# Patient Record
Sex: FEMALE | Race: OTHER | HISPANIC OR LATINO | Employment: FULL TIME | URBAN - METROPOLITAN AREA
[De-identification: names, ages, dates, MRNs, and addresses within clinical notes are randomized per-mention and may not be internally consistent; named-entity substitution may affect disease eponyms.]

---

## 2017-04-05 ENCOUNTER — HOSPITAL ENCOUNTER (EMERGENCY)
Facility: HOSPITAL | Age: 22
Discharge: HOME | End: 2017-04-05
Attending: EMERGENCY MEDICINE | Admitting: EMERGENCY MEDICINE

## 2017-04-05 VITALS
HEART RATE: 83 BPM | SYSTOLIC BLOOD PRESSURE: 107 MMHG | RESPIRATION RATE: 16 BRPM | OXYGEN SATURATION: 98 % | WEIGHT: 175 LBS | DIASTOLIC BLOOD PRESSURE: 63 MMHG | TEMPERATURE: 98.3 F

## 2017-04-05 DIAGNOSIS — R10.9 ABDOMINAL PAIN: Primary | ICD-10-CM

## 2017-04-05 LAB
ALBUMIN SERPL BCP-MCNC: 3.3 G/DL (ref 3.5–5)
ALP SERPL-CCNC: 80 U/L (ref 46–116)
ALT SERPL W P-5'-P-CCNC: 30 U/L (ref 12–78)
ANION GAP SERPL CALCULATED.3IONS-SCNC: 6 MMOL/L (ref 4–13)
AST SERPL W P-5'-P-CCNC: 22 U/L (ref 5–45)
BASOPHILS # BLD AUTO: 0.02 THOUSANDS/ΜL (ref 0–0.1)
BASOPHILS NFR BLD AUTO: 0 % (ref 0–1)
BILIRUB SERPL-MCNC: 0.28 MG/DL (ref 0.2–1)
BILIRUB UR QL STRIP: NEGATIVE
BUN SERPL-MCNC: 12 MG/DL (ref 5–25)
CALCIUM SERPL-MCNC: 8.7 MG/DL (ref 8.3–10.1)
CHLORIDE SERPL-SCNC: 102 MMOL/L (ref 100–108)
CLARITY UR: CLEAR
CO2 SERPL-SCNC: 31 MMOL/L (ref 21–32)
COLOR UR: YELLOW
COLOR, POC: YELLOW
CREAT SERPL-MCNC: 0.91 MG/DL (ref 0.6–1.3)
EOSINOPHIL # BLD AUTO: 0.15 THOUSAND/ΜL (ref 0–0.61)
EOSINOPHIL NFR BLD AUTO: 2 % (ref 0–6)
ERYTHROCYTE [DISTWIDTH] IN BLOOD BY AUTOMATED COUNT: 13.3 % (ref 11.6–15.1)
GFR SERPL CREATININE-BSD FRML MDRD: >60 ML/MIN/1.73SQ M
GLUCOSE SERPL-MCNC: 88 MG/DL (ref 65–140)
GLUCOSE UR STRIP-MCNC: NEGATIVE MG/DL
HCG UR QL: NEGATIVE
HCT VFR BLD AUTO: 41.2 % (ref 34.8–46.1)
HGB BLD-MCNC: 14.3 G/DL (ref 11.5–15.4)
HGB UR QL STRIP.AUTO: NEGATIVE
KETONES UR STRIP-MCNC: NEGATIVE MG/DL
LEUKOCYTE ESTERASE UR QL STRIP: NEGATIVE
LIPASE SERPL-CCNC: 145 U/L (ref 73–393)
LYMPHOCYTES # BLD AUTO: 2.12 THOUSANDS/ΜL (ref 0.6–4.47)
LYMPHOCYTES NFR BLD AUTO: 21 % (ref 14–44)
MCH RBC QN AUTO: 30 PG (ref 26.8–34.3)
MCHC RBC AUTO-ENTMCNC: 34.7 G/DL (ref 31.4–37.4)
MCV RBC AUTO: 87 FL (ref 82–98)
MONOCYTES # BLD AUTO: 0.92 THOUSAND/ΜL (ref 0.17–1.22)
MONOCYTES NFR BLD AUTO: 9 % (ref 4–12)
NEUTROPHILS # BLD AUTO: 6.96 THOUSANDS/ΜL (ref 1.85–7.62)
NEUTS SEG NFR BLD AUTO: 68 % (ref 43–75)
NITRITE UR QL STRIP: NEGATIVE
NRBC BLD AUTO-RTO: 0 /100 WBCS
PH UR STRIP.AUTO: 7 [PH] (ref 4.5–8)
PLATELET # BLD AUTO: 332 THOUSANDS/UL (ref 149–390)
PMV BLD AUTO: 9.3 FL (ref 8.9–12.7)
POTASSIUM SERPL-SCNC: 3.9 MMOL/L (ref 3.5–5.3)
PROT SERPL-MCNC: 7.4 G/DL (ref 6.4–8.2)
PROT UR STRIP-MCNC: NEGATIVE MG/DL
RBC # BLD AUTO: 4.76 MILLION/UL (ref 3.81–5.12)
SODIUM SERPL-SCNC: 139 MMOL/L (ref 136–145)
SP GR UR STRIP.AUTO: 1.01 (ref 1–1.03)
UROBILINOGEN UR QL STRIP.AUTO: 0.2 E.U./DL
WBC # BLD AUTO: 10.17 THOUSAND/UL (ref 4.31–10.16)

## 2017-04-05 PROCEDURE — 80053 COMPREHEN METABOLIC PANEL: CPT | Performed by: EMERGENCY MEDICINE

## 2017-04-05 PROCEDURE — 83690 ASSAY OF LIPASE: CPT | Performed by: EMERGENCY MEDICINE

## 2017-04-05 PROCEDURE — 85025 COMPLETE CBC W/AUTO DIFF WBC: CPT | Performed by: EMERGENCY MEDICINE

## 2017-04-05 PROCEDURE — 99284 EMERGENCY DEPT VISIT MOD MDM: CPT

## 2017-04-05 PROCEDURE — 36415 COLL VENOUS BLD VENIPUNCTURE: CPT | Performed by: EMERGENCY MEDICINE

## 2017-04-05 PROCEDURE — 81025 URINE PREGNANCY TEST: CPT

## 2017-04-05 PROCEDURE — 81003 URINALYSIS AUTO W/O SCOPE: CPT

## 2017-04-05 PROCEDURE — 81002 URINALYSIS NONAUTO W/O SCOPE: CPT

## 2017-04-05 RX ORDER — NAPROXEN 500 MG/1
500 TABLET ORAL 2 TIMES DAILY WITH MEALS
Qty: 20 TABLET | Refills: 0 | Status: SHIPPED | OUTPATIENT
Start: 2017-04-05 | End: 2019-04-05

## 2017-11-08 ENCOUNTER — HOSPITAL ENCOUNTER (EMERGENCY)
Facility: HOSPITAL | Age: 22
Discharge: HOME | End: 2017-11-08
Attending: EMERGENCY MEDICINE | Admitting: EMERGENCY MEDICINE

## 2017-11-08 VITALS
SYSTOLIC BLOOD PRESSURE: 138 MMHG | DIASTOLIC BLOOD PRESSURE: 65 MMHG | TEMPERATURE: 98.6 F | HEART RATE: 96 BPM | WEIGHT: 190 LBS | OXYGEN SATURATION: 98 % | RESPIRATION RATE: 18 BRPM

## 2017-11-08 DIAGNOSIS — F32.A DEPRESSION, UNSPECIFIED DEPRESSION TYPE: Primary | ICD-10-CM

## 2017-11-08 PROCEDURE — 99283 EMERGENCY DEPT VISIT LOW MDM: CPT

## 2017-11-08 NOTE — DISCHARGE INSTRUCTIONS
You have been given a list of resources for depression treatment  You denied you were suicidal, homicidal, ideations, auditory or visual hallucinations  Call Crisis if you are feeling suicidal       Depression, Ambulatory Care   GENERAL INFORMATION:   Depression  is a medical condition that causes feelings of sadness or hopelessness that do not go away  Depression may cause you to lose interest in things you used to enjoy  These feelings may interfere with your daily life  Common symptoms include the following:   · Appetite changes, or weight gain or loss    · Trouble going to sleep or staying asleep, or sleeping too much    · Fatigue or lack of energy    · Feeling restless, irritable, or withdrawn    · Feeling worthless, hopeless, discouraged, or very guilty    · Trouble concentrating, remembering things, doing daily tasks, or making decisions    · Thoughts about hurting or killing yourself  Seek immediate care for the following symptoms:   · You think about harming yourself or someone else  Treatment for depression  may include medicine to improve or balance your mood  Therapy may also be used to treat your depression  A therapist will help you learn to cope with your thoughts and feelings  Therapy can be done alone or in a group  It may also be done with family members or a significant other  Manage depression:   · Get regular physical activity  Try to exercise for 30 minutes, 3 to 5 days a week  Work with your healthcare provider to develop an exercise plan that you enjoy  · Get enough sleep  Create a routine to help you relax before bed  Try to go to bed and wake up at the same time every day  Sleep is important for emotional health  · Eat a variety of healthy foods  Healthy foods include fruits, vegetables, whole-grain breads, low-fat dairy products, beans, lean meats, and fish  A healthy meal plan is low in fat, salt, and added sugar  · Avoid or limit alcohol    Ask your healthcare provider how much alcohol is safe for you to drink  A drink of alcohol is 12 ounces of beer, 5 ounces of wine, or 1½ ounces of liquor  Follow up with your healthcare provider as directed: You will need to return so your healthcare provider can monitor your progress  Write down your questions so you remember to ask them during your visits  CARE AGREEMENT:   You have the right to help plan your care  Learn about your health condition and how it may be treated  Discuss treatment options with your caregivers to decide what care you want to receive  You always have the right to refuse treatment  The above information is an  only  It is not intended as medical advice for individual conditions or treatments  Talk to your doctor, nurse or pharmacist before following any medical regimen to see if it is safe and effective for you  © 2014 [address] is for End User's use only and may not be sold, redistributed or otherwise used for commercial purposes  All illustrations and images included in CareNotes® are the copyrighted property of [employer]  or Jorge Byrne

## 2017-11-08 NOTE — ED PROVIDER NOTES
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, she states she has a lot of stressors and is depressed  She denies any SI, HI, AH, VH  She states she does not have a PCP for follow up  She states she has never been on anti depressants before  She states she was just seen at [address] [place] [address] [address] and was told they do not prescribe meds, she was given a school note and referral info and "nothing was done for me"  History provided by:  Patient and medical records  History limited by:  Acuity of condition   used: No    Depression   Presenting symptoms: depression    Presenting symptoms: no suicidal thoughts    Degree of incapacity (severity): Moderate  Onset quality:  Gradual  Chronicity:  New  Context: stressful life event    Treatment compliance:  Untreated  Relieved by:  Nothing  Worsened by:  Nothing  Ineffective treatments:  None tried  Associated symptoms: trouble in school        Prior to Admission Medications   Prescriptions Last Dose Informant Patient Reported? Taking?   naproxen (NAPROSYN) 500 mg tablet   No No   Sig: Take 1 tablet by mouth 2 (two) times a day with meals for 10 days      Facility-Administered Medications: None       History reviewed  No pertinent past medical history  History reviewed  No pertinent surgical history  History reviewed  No pertinent family history  I have reviewed and agree with the history as documented      Social History   Substance Use Topics    Smoking status: Never Smoker    Smokeless tobacco: Never Used    Alcohol use Yes      Comment: socially        Review of Systems   Constitutional: Negative  HENT: Negative  Eyes: Negative  Respiratory: Negative  Cardiovascular: Negative  Gastrointestinal: Negative  Genitourinary: Negative  Musculoskeletal: Negative  Skin: Negative  Allergic/Immunologic: Negative  Neurological: Negative  Hematological: Negative  Psychiatric/Behavioral: Positive for decreased concentration and depression  Negative for suicidal ideas  Physical Exam  ED Triage Vitals [11/08/17 0137]   Temperature Pulse Respirations Blood Pressure SpO2   98 6 °F (37 °C) 96 18 138/65 98 %      Temp src Heart Rate Source Patient Position - Orthostatic VS BP Location FiO2 (%)   -- -- -- -- --      Pain Score       No Pain           Orthostatic Vital Signs  Vitals:    11/08/17 0137   BP: 138/65   Pulse: 96       Physical Exam   Constitutional: She is oriented to person, place, and time  She appears well-developed and well-nourished  She appears distressed  Tearful and crying over situation    HENT:   Head: Normocephalic and atraumatic  Eyes: Conjunctivae and EOM are normal  Pupils are equal, round, and reactive to light  Neck: Normal range of motion  Neck supple  Cardiovascular: Normal rate, regular rhythm and normal heart sounds  Pulmonary/Chest: Effort normal and breath sounds normal    Abdominal: Soft  Bowel sounds are normal    Musculoskeletal: Normal range of motion  Neurological: She is alert and oriented to person, place, and time  Skin: Skin is warm and dry  Capillary refill takes less than 2 seconds  She is not diaphoretic  Psychiatric: She has a normal mood and affect  Her behavior is normal  Judgment and thought content normal    Nursing note and vitals reviewed        ED Medications  Medications - No data to display    Diagnostic Studies  Results Reviewed     None                 No orders to display              Procedures  Procedures       Phone Contacts  ED Phone Contact    ED Course  ED Course                                MDM  Number of Diagnoses or Management Options  Depression, unspecified depression type:   Diagnosis management comments: Depression - denies SI, HI, AH, VH    List of OP mental health referrals given to pt  Explained to call crisis if needed  Pt verbalizes understanding of d/c instructions  CritCare Time    Disposition  Final diagnoses:   Depression, unspecified depression type     Time reflects when diagnosis was documented in both MDM as applicable and the Disposition within this note     Time User Action Codes Description Comment    11/8/2017  1:51 AM Andra Organ Add [F32 9] Depression, unspecified depression type       ED Disposition     ED Disposition Condition Comment    Discharge  [place] discharge to home/self care  Condition at discharge: Good        Follow-up Information     Follow up With Specialties Details Why Contact Info Additional Information    [address]  Schedule an appointment as soon as possible for a visit  [facility] Emergency Department Emergency Medicine  If symptoms worsen [address] [place] [address] ED, [address] [place] [address], [address]        Discharge Medication List as of 11/8/2017  1:52 AM      CONTINUE these medications which have NOT CHANGED    Details   naproxen (NAPROSYN) 500 mg tablet Take 1 tablet by mouth 2 (two) times a day with meals for 10 days, Starting 4/5/2017, Until Sat 4/15/17, Print           No discharge procedures on file      ED Provider  Electronically Signed by           David Harris  11/08/17 0159

## 2018-10-03 ENCOUNTER — OFFICE VISIT (OUTPATIENT)
Dept: OBGYN CLINIC | Facility: CLINIC | Age: 23
End: 2018-10-03

## 2018-10-03 ENCOUNTER — APPOINTMENT (OUTPATIENT)
Dept: LAB | Facility: HOSPITAL | Age: 23
End: 2018-10-03
Attending: OBSTETRICS & GYNECOLOGY
Payer: COMMERCIAL

## 2018-10-03 VITALS
WEIGHT: 179.8 LBS | HEIGHT: 59 IN | DIASTOLIC BLOOD PRESSURE: 70 MMHG | BODY MASS INDEX: 36.25 KG/M2 | SYSTOLIC BLOOD PRESSURE: 110 MMHG

## 2018-10-03 DIAGNOSIS — N92.6 MISSED PERIOD: ICD-10-CM

## 2018-10-03 DIAGNOSIS — N92.6 MISSED PERIOD: Primary | ICD-10-CM

## 2018-10-03 LAB
B-HCG SERPL-ACNC: <2 MIU/ML
SL AMB POCT URINE HCG: NEGATIVE

## 2018-10-03 PROCEDURE — 36415 COLL VENOUS BLD VENIPUNCTURE: CPT

## 2018-10-03 PROCEDURE — 84702 CHORIONIC GONADOTROPIN TEST: CPT

## 2018-10-03 PROCEDURE — 81025 URINE PREGNANCY TEST: CPT | Performed by: OBSTETRICS & GYNECOLOGY

## 2018-10-03 PROCEDURE — 99202 OFFICE O/P NEW SF 15 MIN: CPT | Performed by: OBSTETRICS & GYNECOLOGY

## 2018-10-04 NOTE — PROGRESS NOTES
Assessment/Plan:     Assessment:  Missed  Plan: Will check a urine pregnancy test today    We will then follow that with a serum pregnancy test as well    Birth control decision will then be determine    Patient will call should any problems issues or concerns arise       Diagnoses and all orders for this visit:    Missed period  -     POCT urine HCG  -     hCG, quantitative; Future          Subjective:     Patient ID: Jace Sweeney is a 25 y o  female  Patient is a 42-year-old female who presents today complaining of a missed  Patient reports that her periods have generally been normal and she is not experiencing any erratic bleeding    She also denies any significant pelvic or abdominal pain    She denies any fevers shakes or chills as well    She is sexually active and uses barrier protection for contraception faithfully    We obtained a baseline urine pregnancy test today which was negative and we will order a serum pregnancy test to confirmed that she is not pregnant    Once all results are confirmed, we will determine which option she desires for birth control    All questions were answered in detail for her at today's visit    Time spent for this patient was 20 minutes of which greater than 50% was spent face-to-face counseling and developing a plan of care        Review of Systems   Constitutional: Negative  HENT: Negative  Eyes: Negative  Respiratory: Negative  Cardiovascular: Negative  Gastrointestinal: Negative  Endocrine: Negative  Genitourinary: Negative  Musculoskeletal: Negative  Skin: Negative  Neurological: Negative  Psychiatric/Behavioral: Negative  Objective:     Physical Exam   Constitutional: She is oriented to person, place, and time  She appears well-developed and well-nourished  HENT:   Head: Normocephalic  Eyes: Pupils are equal, round, and reactive to light  Neck: Normal range of motion     Pulmonary/Chest: Effort normal  Musculoskeletal: Normal range of motion  Neurological: She is alert and oriented to person, place, and time  Psychiatric: She has a normal mood and affect   Her behavior is normal  Judgment and thought content normal

## 2018-10-04 NOTE — PATIENT INSTRUCTIONS
Topic:  Ms     Urine pregnancy test performed in the office today was negative    We will perform a serum pregnancy test to confirm the results    Patient will then make a decision for birth control    All questions were answered in detail for her today    Patient will call should any problems issues or concerns arise for her

## 2019-03-05 ENCOUNTER — HOSPITAL ENCOUNTER (EMERGENCY)
Facility: HOSPITAL | Age: 24
Discharge: HOME | End: 2019-03-05
Attending: EMERGENCY MEDICINE

## 2019-03-05 VITALS
BODY MASS INDEX: 35.75 KG/M2 | RESPIRATION RATE: 14 BRPM | OXYGEN SATURATION: 100 % | HEART RATE: 66 BPM | TEMPERATURE: 98.2 F | DIASTOLIC BLOOD PRESSURE: 77 MMHG | SYSTOLIC BLOOD PRESSURE: 116 MMHG | WEIGHT: 177 LBS

## 2019-03-05 DIAGNOSIS — R00.2 HEART PALPITATIONS: ICD-10-CM

## 2019-03-05 DIAGNOSIS — Z34.90 PREGNANCY: ICD-10-CM

## 2019-03-05 DIAGNOSIS — R00.2 PALPITATIONS: Primary | ICD-10-CM

## 2019-03-05 LAB
ALBUMIN SERPL BCP-MCNC: 3.3 G/DL (ref 3.5–5)
ALP SERPL-CCNC: 74 U/L (ref 46–116)
ALT SERPL W P-5'-P-CCNC: 22 U/L (ref 12–78)
ANION GAP SERPL CALCULATED.3IONS-SCNC: 7 MMOL/L (ref 4–13)
AST SERPL W P-5'-P-CCNC: 19 U/L (ref 5–45)
ATRIAL RATE: 76 BPM
B-HCG SERPL-ACNC: 931.9 MIU/ML
BASOPHILS # BLD AUTO: 0.02 THOUSANDS/ΜL (ref 0–0.1)
BASOPHILS NFR BLD AUTO: 0 % (ref 0–1)
BILIRUB SERPL-MCNC: 0.2 MG/DL (ref 0.2–1)
BILIRUB UR QL STRIP: NEGATIVE
BUN SERPL-MCNC: 13 MG/DL (ref 5–25)
CALCIUM SERPL-MCNC: 8.7 MG/DL (ref 8.3–10.1)
CHLORIDE SERPL-SCNC: 103 MMOL/L (ref 100–108)
CLARITY UR: CLEAR
CO2 SERPL-SCNC: 29 MMOL/L (ref 21–32)
COLOR UR: YELLOW
CREAT SERPL-MCNC: 0.92 MG/DL (ref 0.6–1.3)
EOSINOPHIL # BLD AUTO: 0.13 THOUSAND/ΜL (ref 0–0.61)
EOSINOPHIL NFR BLD AUTO: 1 % (ref 0–6)
ERYTHROCYTE [DISTWIDTH] IN BLOOD BY AUTOMATED COUNT: 13.7 % (ref 11.6–15.1)
EXT PREG TEST URINE: POSITIVE
GFR SERPL CREATININE-BSD FRML MDRD: 88 ML/MIN/1.73SQ M
GLUCOSE SERPL-MCNC: 88 MG/DL (ref 65–140)
GLUCOSE UR STRIP-MCNC: NEGATIVE MG/DL
HCT VFR BLD AUTO: 44.8 % (ref 34.8–46.1)
HGB BLD-MCNC: 15 G/DL (ref 11.5–15.4)
HGB UR QL STRIP.AUTO: NEGATIVE
IMM GRANULOCYTES # BLD AUTO: 0.04 THOUSAND/UL (ref 0–0.2)
IMM GRANULOCYTES NFR BLD AUTO: 0 % (ref 0–2)
KETONES UR STRIP-MCNC: NEGATIVE MG/DL
LEUKOCYTE ESTERASE UR QL STRIP: NEGATIVE
LYMPHOCYTES # BLD AUTO: 3.05 THOUSANDS/ΜL (ref 0.6–4.47)
LYMPHOCYTES NFR BLD AUTO: 23 % (ref 14–44)
MCH RBC QN AUTO: 29.5 PG (ref 26.8–34.3)
MCHC RBC AUTO-ENTMCNC: 33.5 G/DL (ref 31.4–37.4)
MCV RBC AUTO: 88 FL (ref 82–98)
MONOCYTES # BLD AUTO: 0.84 THOUSAND/ΜL (ref 0.17–1.22)
MONOCYTES NFR BLD AUTO: 6 % (ref 4–12)
NEUTROPHILS # BLD AUTO: 8.96 THOUSANDS/ΜL (ref 1.85–7.62)
NEUTS SEG NFR BLD AUTO: 70 % (ref 43–75)
NITRITE UR QL STRIP: NEGATIVE
NRBC BLD AUTO-RTO: 0 /100 WBCS
P AXIS: 44 DEGREES
PH UR STRIP.AUTO: 6.5 [PH] (ref 4.5–8)
PLATELET # BLD AUTO: 379 THOUSANDS/UL (ref 149–390)
PMV BLD AUTO: 9.3 FL (ref 8.9–12.7)
POTASSIUM SERPL-SCNC: 3.8 MMOL/L (ref 3.5–5.3)
PR INTERVAL: 140 MS
PROT SERPL-MCNC: 7.8 G/DL (ref 6.4–8.2)
PROT UR STRIP-MCNC: NEGATIVE MG/DL
QRS AXIS: 9 DEGREES
QRSD INTERVAL: 64 MS
QT INTERVAL: 358 MS
QTC INTERVAL: 402 MS
RBC # BLD AUTO: 5.09 MILLION/UL (ref 3.81–5.12)
SODIUM SERPL-SCNC: 139 MMOL/L (ref 136–145)
SP GR UR STRIP.AUTO: 1.02 (ref 1–1.03)
T WAVE AXIS: 2 DEGREES
TSH SERPL DL<=0.05 MIU/L-ACNC: 2 UIU/ML (ref 0.36–3.74)
UROBILINOGEN UR QL STRIP.AUTO: 0.2 E.U./DL
VENTRICULAR RATE: 76 BPM
WBC # BLD AUTO: 13.04 THOUSAND/UL (ref 4.31–10.16)

## 2019-03-05 PROCEDURE — 80053 COMPREHEN METABOLIC PANEL: CPT | Performed by: PHYSICIAN ASSISTANT

## 2019-03-05 PROCEDURE — 93005 ELECTROCARDIOGRAM TRACING: CPT

## 2019-03-05 PROCEDURE — 81003 URINALYSIS AUTO W/O SCOPE: CPT

## 2019-03-05 PROCEDURE — 96360 HYDRATION IV INFUSION INIT: CPT

## 2019-03-05 PROCEDURE — 84702 CHORIONIC GONADOTROPIN TEST: CPT | Performed by: PHYSICIAN ASSISTANT

## 2019-03-05 PROCEDURE — 85025 COMPLETE CBC W/AUTO DIFF WBC: CPT | Performed by: PHYSICIAN ASSISTANT

## 2019-03-05 PROCEDURE — 36415 COLL VENOUS BLD VENIPUNCTURE: CPT | Performed by: PHYSICIAN ASSISTANT

## 2019-03-05 PROCEDURE — 99285 EMERGENCY DEPT VISIT HI MDM: CPT

## 2019-03-05 PROCEDURE — 81025 URINE PREGNANCY TEST: CPT | Performed by: EMERGENCY MEDICINE

## 2019-03-05 PROCEDURE — 84443 ASSAY THYROID STIM HORMONE: CPT | Performed by: PHYSICIAN ASSISTANT

## 2019-03-05 PROCEDURE — 93010 ELECTROCARDIOGRAM REPORT: CPT | Performed by: INTERNAL MEDICINE

## 2019-03-05 RX ORDER — FAMOTIDINE 20 MG
1 TABLET ORAL DAILY
Qty: 30 EACH | Refills: 0 | Status: SHIPPED | OUTPATIENT
Start: 2019-03-05 | End: 2019-04-05 | Stop reason: SDUPTHER

## 2019-03-05 RX ADMIN — SODIUM CHLORIDE 1000 ML: 0.9 INJECTION, SOLUTION INTRAVENOUS at 07:53

## 2019-03-05 NOTE — ED PROVIDER NOTES
History  Chief Complaint   Patient presents with    Palpitations     Pt  reports intermittent palpitations x 2 weeks becoming more frequent with episodes of dizziness  Denies sob  This is a 51-year-old female patient presented 2 weeks history of intermittent palpitations in her chest without chest pain or shortness of breath  She noticed over the last week she has had more frequent episodes she states about 8-10 a day  Today she went to get up from a seated position and she became dizzy so she was sent in by her employment  She has never had her thyroid tested  Her last menstrual cycle was in December 2018 but states it is irregular due to [employer] birth control  No headache no blurred vision or double vision no cough congestion sore throat nausea vomiting diarrhea abdominal pain no chest pain or shortness of breath urgency frequency or dysuria  No nothing makes it better or worse she tried nothing over-the-counter and she is not seen her family doctor because she has not have 1  Differential diagnosis includes not limited to palpitations due to anxiety, palpitations due to thyroid issues, pregnancy, electrolyte abnormality, cardiac arrhythmia          Prior to Admission Medications   Prescriptions Last Dose Informant Patient Reported? Taking?   naproxen (NAPROSYN) 500 mg tablet   No No   Sig: Take 1 tablet by mouth 2 (two) times a day with meals for 10 days      Facility-Administered Medications: None       History reviewed  No pertinent past medical history  History reviewed  No pertinent surgical history  History reviewed  No pertinent family history  I have reviewed and agree with the history as documented  Social History     Tobacco Use    Smoking status: Never Smoker    Smokeless tobacco: Never Used   Substance Use Topics    Alcohol use: Yes     Comment: socially    Drug use: No        Review of Systems   All other systems reviewed and are negative        Physical Exam  Physical Exam Constitutional: She appears well-developed and well-nourished  HENT:   Head: Normocephalic and atraumatic  Right Ear: External ear normal    Left Ear: External ear normal    Nose: Nose normal    Mouth/Throat: Oropharynx is clear and moist    Eyes: Pupils are equal, round, and reactive to light  Conjunctivae are normal    Neck: Normal range of motion  Neck supple  Cardiovascular: Normal rate and regular rhythm  Pulmonary/Chest: Effort normal and breath sounds normal    Abdominal: Soft  Bowel sounds are normal  There is no tenderness  Neurological: She is alert  Skin: Skin is warm  Psychiatric: She has a normal mood and affect  Her behavior is normal    Nursing note and vitals reviewed        Vital Signs  ED Triage Vitals   Temperature Pulse Respirations Blood Pressure SpO2   03/05/19 0717 03/05/19 0716 03/05/19 0716 03/05/19 0716 03/05/19 0716   98 2 °F (36 8 °C) 76 18 117/79 100 %      Temp Source Heart Rate Source Patient Position - Orthostatic VS BP Location FiO2 (%)   03/05/19 0717 03/05/19 0922 03/05/19 0922 03/05/19 0922 --   Oral Monitor Lying Right arm       Pain Score       03/05/19 0716       No Pain           Vitals:    03/05/19 0716 03/05/19 0922   BP: 117/79 116/77   Pulse: 76 66   Patient Position - Orthostatic VS:  Lying       Visual Acuity  Visual Acuity      Most Recent Value   L Pupil Size (mm)  3   R Pupil Size (mm)  3          ED Medications  Medications   sodium chloride 0 9 % bolus 1,000 mL (0 mL Intravenous Stopped 3/5/19 0858)       Diagnostic Studies  Results Reviewed     Procedure Component Value Units Date/Time    Quantitative hCG [[id number]]  (Abnormal) Collected:  03/05/19 0752    Lab Status:  Final result Specimen:  Blood from Arm, Left Updated:  03/05/19 0906     HCG, Quant 931 9 mIU/mL     Narrative:        Expected Ranges:   Approximate               Approximate HCG  Gestation age          Concentration ( mIU/mL)  _____________          ______________________   Liane Ewing HCG values  0 2-1                       5-50  1-2                           2-3                         100-5000  3-4                         500-15246  4-5                         1000-71186  5-6                         58207-027204  6-8                         41215-891323  8-12                        13817-294592    TSH [[id number]]  (Normal) Collected:  03/05/19 0752    Lab Status:  Final result Specimen:  Blood from Arm, Left Updated:  03/05/19 0837     TSH 3RD GENERATON 1 998 uIU/mL     Narrative:       Patients undergoing fluorescein dye angiography may retain small amounts of fluorescein in the body for 48-72 hours post procedure  Samples containing fluorescein can produce falsely depressed TSH values  If the patient had this procedure,a specimen should be resubmitted post fluorescein clearance  Comprehensive metabolic panel [[id number]]  (Abnormal) Collected:  03/05/19 0752    Lab Status:  Final result Specimen:  Blood from Arm, Left Updated:  03/05/19 1933     Sodium 139 mmol/L      Potassium 3 8 mmol/L      Chloride 103 mmol/L      CO2 29 mmol/L      ANION GAP 7 mmol/L      BUN 13 mg/dL      Creatinine 0 92 mg/dL      Glucose 88 mg/dL      Calcium 8 7 mg/dL      AST 19 U/L      ALT 22 U/L      Alkaline Phosphatase 74 U/L      Total Protein 7 8 g/dL      Albumin 3 3 g/dL      Total Bilirubin 0 20 mg/dL      eGFR 88 ml/min/1 73sq m     Narrative:       National Kidney Disease Education Program recommendations are as follows:  GFR calculation is accurate only with a steady state creatinine  Chronic Kidney disease less than 60 ml/min/1 73 sq  meters  Kidney failure less than 15 ml/min/1 73 sq  meters      CBC and differential [[id number]]  (Abnormal) Collected:  03/05/19 0752    Lab Status:  Final result Specimen:  Blood from Arm, Left Updated:  03/05/19 0801     WBC 13 04 Thousand/uL      RBC 5 09 Million/uL      Hemoglobin 15 0 g/dL      Hematocrit 44 8 %      MCV 88 fL      MCH 29 5 pg MCHC 33 5 g/dL      RDW 13 7 %      MPV 9 3 fL      Platelets 271 Thousands/uL      nRBC 0 /100 WBCs      Neutrophils Relative 70 %      Immat GRANS % 0 %      Lymphocytes Relative 23 %      Monocytes Relative 6 %      Eosinophils Relative 1 %      Basophils Relative 0 %      Neutrophils Absolute 8 96 Thousands/µL      Immature Grans Absolute 0 04 Thousand/uL      Lymphocytes Absolute 3 05 Thousands/µL      Monocytes Absolute 0 84 Thousand/µL      Eosinophils Absolute 0 13 Thousand/µL      Basophils Absolute 0 02 Thousands/µL     POCT urinalysis dipstick [[id number]]  (Abnormal) Resulted:  03/05/19 0757    Lab Status:  Final result Updated:  03/05/19 0757    POCT pregnancy, urine [[id number]]  (Abnormal) Resulted:  03/05/19 0757    Lab Status:  Final result Updated:  03/05/19 0757     EXT PREG TEST UR (Ref: Negative) positive    ED Urine Macroscopic [[id number]] Collected:  03/05/19 0750    Lab Status:  Final result Specimen:  Urine Updated:  03/05/19 0749     Color, UA Yellow     Clarity, UA Clear     pH, UA 6 5     Leukocytes, UA Negative     Nitrite, UA Negative     Protein, UA Negative mg/dl      Glucose, UA Negative mg/dl      Ketones, UA Negative mg/dl      Urobilinogen, UA 0 2 E U /dl      Bilirubin, UA Negative     Blood, UA Negative     Specific Gravity, UA 1 020    Narrative:       CLINITEK RESULT                 No orders to display              Procedures  Procedures       Phone Contacts  ED Phone Contact    ED Course  ED Course as of Mar 05 0937   Tue Mar 05, 2019   0739 Initial EKG interpreted by me 76 beats per minute normal sinus rhythm no ST elevation no ectopics  normal axis no comparisons  MDM  Number of Diagnoses or Management Options  Diagnosis management comments: This is a 51-year-old female patient who presented with symptoms of tachycardia for 2 weeks  No chest pain no shortness of breath and today she a little dizzy upon standing    He was found that she is pregnant I could not find the transabdominal only when ultrasound and her quant is approximately over 900 she has no abdominal pain no cramping no vaginal bleeding or discharge  Her only symptoms is the intermittent tachycardia which she is symptomatic  And since the fluid she is feeling better  She will be referred to a family doctor at this, cardiologist and OBGYN  Disposition  Final diagnoses:   Palpitations   Pregnancy   Heart palpitations     Time reflects when diagnosis was documented in both MDM as applicable and the Disposition within this note     Time User Action Codes Description Comment    3/5/2019  9:33 AM Felix Delaney Add [R00 2] Palpitations     3/5/2019  9:33 AM Felix Delaney Add [Z34 90] Pregnancy     3/5/2019  9:33 AM Felix Delaney Add [R00 2] Heart palpitations       ED Disposition     ED Disposition Condition Date/Time Comment    Discharge Stable Tu Mar 5, 2019  9:35 AM Marcello Barbosa discharge to home/self care              Follow-up Information     Follow up With Specialties Details Why Contact Info Additional [address] Obstetrics and Gynecology Schedule an appointment as soon as possible for a visit   [address] [facility] [phone]  [facility], [address], [phone]    [facility] Medicine Schedule an appointment as soon as possible for a visit   [address] [place] [address] Drive [phone]  [facility] Cardiology Schedule an appointment as soon as possible for a visit  For your palpitations [facility] [phone] [address] [facility] [place], [place], [address]          Patient's Medications   Discharge Prescriptions    PRENATAL VIT-FE FUMARATE-FA (PRENATAL COMPLETE) 14-0 4 MG TABS    Take 1 tablet by mouth daily       Start Date: 3/5/2019  End Date: --       Order Dose: 1 tablet       Quantity: 30 each    Refills: 0     No discharge procedures on file      ED Provider  Electronically Signed by           Neal Brooks PA-C  03/05/19 0825

## 2019-03-18 ENCOUNTER — ULTRASOUND (OUTPATIENT)
Dept: OBGYN CLINIC | Facility: CLINIC | Age: 24
End: 2019-03-18
Payer: COMMERCIAL

## 2019-03-18 DIAGNOSIS — O36.80X0 ENCOUNTER TO DETERMINE FETAL VIABILITY OF PREGNANCY, SINGLE OR UNSPECIFIED FETUS: Primary | ICD-10-CM

## 2019-03-18 PROCEDURE — 76801 OB US < 14 WKS SINGLE FETUS: CPT | Performed by: OBSTETRICS & GYNECOLOGY

## 2019-03-18 NOTE — PROGRESS NOTES
SV XAD=448 BPM @ ??11w3d-(very irreg cycles)    CRL=6mm=6w3d  EDC=11/8/2019    UT=85 x 54 x 64 mm  RT OV=33 x 28 x 17 mm  LT OV=34 x 32 x 24 mm

## 2019-04-05 ENCOUNTER — INITIAL PRENATAL (OUTPATIENT)
Dept: OBGYN CLINIC | Facility: CLINIC | Age: 24
End: 2019-04-05
Payer: COMMERCIAL

## 2019-04-05 DIAGNOSIS — Z36.9 ENCOUNTER FOR ANTENATAL SCREENING: ICD-10-CM

## 2019-04-05 DIAGNOSIS — Z34.90 PREGNANCY: ICD-10-CM

## 2019-04-05 DIAGNOSIS — Z34.01 ENCOUNTER FOR SUPERVISION OF NORMAL FIRST PREGNANCY IN FIRST TRIMESTER: Primary | ICD-10-CM

## 2019-04-05 PROCEDURE — 99213 OFFICE O/P EST LOW 20 MIN: CPT | Performed by: PHYSICIAN ASSISTANT

## 2019-04-05 RX ORDER — FAMOTIDINE 20 MG
1 TABLET ORAL DAILY
Qty: 30 EACH | Refills: 3 | Status: SHIPPED | OUTPATIENT
Start: 2019-04-05 | End: 2019-04-23 | Stop reason: SDUPTHER

## 2019-04-10 ENCOUNTER — HOSPITAL ENCOUNTER (EMERGENCY)
Facility: HOSPITAL | Age: 24
Discharge: HOME | End: 2019-04-10
Attending: EMERGENCY MEDICINE | Admitting: EMERGENCY MEDICINE
Payer: COMMERCIAL

## 2019-04-10 VITALS
BODY MASS INDEX: 35.76 KG/M2 | DIASTOLIC BLOOD PRESSURE: 69 MMHG | RESPIRATION RATE: 18 BRPM | WEIGHT: 177.03 LBS | SYSTOLIC BLOOD PRESSURE: 112 MMHG | TEMPERATURE: 97.3 F | HEART RATE: 62 BPM | OXYGEN SATURATION: 100 %

## 2019-04-10 DIAGNOSIS — R51.9 HEADACHE: Primary | ICD-10-CM

## 2019-04-10 PROCEDURE — 76815 OB US LIMITED FETUS(S): CPT | Performed by: EMERGENCY MEDICINE

## 2019-04-10 PROCEDURE — 99283 EMERGENCY DEPT VISIT LOW MDM: CPT

## 2019-04-10 PROCEDURE — 99284 EMERGENCY DEPT VISIT MOD MDM: CPT | Performed by: EMERGENCY MEDICINE

## 2019-04-10 RX ORDER — ACETAMINOPHEN 325 MG/1
650 TABLET ORAL ONCE
Status: COMPLETED | OUTPATIENT
Start: 2019-04-10 | End: 2019-04-10

## 2019-04-10 RX ORDER — METOCLOPRAMIDE 10 MG/1
10 TABLET ORAL EVERY 6 HOURS PRN
Qty: 10 TABLET | Refills: 0 | Status: SHIPPED | OUTPATIENT
Start: 2019-04-10 | End: 2019-06-28 | Stop reason: ALTCHOICE

## 2019-04-10 RX ORDER — METOCLOPRAMIDE 10 MG/1
10 TABLET ORAL ONCE
Status: COMPLETED | OUTPATIENT
Start: 2019-04-10 | End: 2019-04-10

## 2019-04-10 RX ADMIN — METOCLOPRAMIDE HYDROCHLORIDE 10 MG: 10 TABLET ORAL at 06:49

## 2019-04-10 RX ADMIN — ACETAMINOPHEN 650 MG: 325 TABLET ORAL at 06:49

## 2019-04-21 ENCOUNTER — HOSPITAL ENCOUNTER (EMERGENCY)
Facility: HOSPITAL | Age: 24
Discharge: HOME | End: 2019-04-21
Attending: EMERGENCY MEDICINE | Admitting: EMERGENCY MEDICINE
Payer: COMMERCIAL

## 2019-04-21 VITALS
BODY MASS INDEX: 35.76 KG/M2 | RESPIRATION RATE: 18 BRPM | DIASTOLIC BLOOD PRESSURE: 69 MMHG | TEMPERATURE: 97.8 F | SYSTOLIC BLOOD PRESSURE: 119 MMHG | OXYGEN SATURATION: 98 % | WEIGHT: 177.03 LBS | HEART RATE: 69 BPM

## 2019-04-21 DIAGNOSIS — J32.9 SINUSITIS: Primary | ICD-10-CM

## 2019-04-21 DIAGNOSIS — B00.9 HERPES INFECTION: ICD-10-CM

## 2019-04-21 DIAGNOSIS — B02.9 SHINGLES: ICD-10-CM

## 2019-04-21 PROCEDURE — 99284 EMERGENCY DEPT VISIT MOD MDM: CPT | Performed by: PHYSICIAN ASSISTANT

## 2019-04-21 PROCEDURE — 99283 EMERGENCY DEPT VISIT LOW MDM: CPT

## 2019-04-21 RX ORDER — LORATADINE 10 MG/1
10 TABLET ORAL DAILY
Qty: 14 TABLET | Refills: 0 | Status: SHIPPED | OUTPATIENT
Start: 2019-04-21 | End: 2019-07-12 | Stop reason: SDUPTHER

## 2019-04-21 RX ORDER — ACYCLOVIR 800 MG/1
800 TABLET ORAL
Qty: 35 TABLET | Refills: 0 | Status: SHIPPED | OUTPATIENT
Start: 2019-04-21 | End: 2019-05-15

## 2019-04-21 RX ORDER — GUAIFENESIN 600 MG
600 TABLET, EXTENDED RELEASE 12 HR ORAL EVERY 12 HOURS SCHEDULED
Qty: 14 TABLET | Refills: 0 | Status: SHIPPED | OUTPATIENT
Start: 2019-04-21 | End: 2019-04-28

## 2019-04-23 ENCOUNTER — INITIAL PRENATAL (OUTPATIENT)
Dept: OBGYN CLINIC | Facility: CLINIC | Age: 24
End: 2019-04-23
Payer: COMMERCIAL

## 2019-04-23 ENCOUNTER — APPOINTMENT (OUTPATIENT)
Dept: LAB | Facility: HOSPITAL | Age: 24
End: 2019-04-23
Payer: COMMERCIAL

## 2019-04-23 VITALS
DIASTOLIC BLOOD PRESSURE: 76 MMHG | HEIGHT: 59 IN | SYSTOLIC BLOOD PRESSURE: 120 MMHG | BODY MASS INDEX: 35.68 KG/M2 | WEIGHT: 177 LBS

## 2019-04-23 DIAGNOSIS — Z34.01 ENCOUNTER FOR SUPERVISION OF NORMAL FIRST PREGNANCY IN FIRST TRIMESTER: Primary | ICD-10-CM

## 2019-04-23 DIAGNOSIS — Z36.9 ENCOUNTER FOR ANTENATAL SCREENING: ICD-10-CM

## 2019-04-23 LAB
ABO GROUP BLD: NORMAL
BASOPHILS # BLD AUTO: 0.03 THOUSANDS/ΜL (ref 0–0.1)
BASOPHILS NFR BLD AUTO: 0 % (ref 0–1)
BILIRUB UR QL STRIP: NEGATIVE
BLD GP AB SCN SERPL QL: NEGATIVE
CLARITY UR: CLEAR
COLOR UR: YELLOW
EOSINOPHIL # BLD AUTO: 0.1 THOUSAND/ΜL (ref 0–0.61)
EOSINOPHIL NFR BLD AUTO: 1 % (ref 0–6)
ERYTHROCYTE [DISTWIDTH] IN BLOOD BY AUTOMATED COUNT: 14.3 % (ref 11.6–15.1)
GLUCOSE UR STRIP-MCNC: NEGATIVE MG/DL
HBV SURFACE AG SER QL: NORMAL
HCT VFR BLD AUTO: 43.4 % (ref 34.8–46.1)
HGB BLD-MCNC: 14.9 G/DL (ref 11.5–15.4)
HGB UR QL STRIP.AUTO: NEGATIVE
IMM GRANULOCYTES # BLD AUTO: 0.08 THOUSAND/UL (ref 0–0.2)
IMM GRANULOCYTES NFR BLD AUTO: 0 % (ref 0–2)
KETONES UR STRIP-MCNC: ABNORMAL MG/DL
LEUKOCYTE ESTERASE UR QL STRIP: NEGATIVE
LYMPHOCYTES # BLD AUTO: 2 THOUSANDS/ΜL (ref 0.6–4.47)
LYMPHOCYTES NFR BLD AUTO: 11 % (ref 14–44)
MCH RBC QN AUTO: 29.9 PG (ref 26.8–34.3)
MCHC RBC AUTO-ENTMCNC: 34.3 G/DL (ref 31.4–37.4)
MCV RBC AUTO: 87 FL (ref 82–98)
MONOCYTES # BLD AUTO: 0.89 THOUSAND/ΜL (ref 0.17–1.22)
MONOCYTES NFR BLD AUTO: 5 % (ref 4–12)
NEUTROPHILS # BLD AUTO: 14.81 THOUSANDS/ΜL (ref 1.85–7.62)
NEUTS SEG NFR BLD AUTO: 83 % (ref 43–75)
NITRITE UR QL STRIP: NEGATIVE
NRBC BLD AUTO-RTO: 0 /100 WBCS
PH UR STRIP.AUTO: 5.5 [PH]
PLATELET # BLD AUTO: 371 THOUSANDS/UL (ref 149–390)
PMV BLD AUTO: 9.8 FL (ref 8.9–12.7)
PROT UR STRIP-MCNC: NEGATIVE MG/DL
RBC # BLD AUTO: 4.99 MILLION/UL (ref 3.81–5.12)
RH BLD: POSITIVE
RUBV IGG SERPL IA-ACNC: 32.7 IU/ML
SP GR UR STRIP.AUTO: 1.01 (ref 1–1.03)
SPECIMEN EXPIRATION DATE: NORMAL
UROBILINOGEN UR QL STRIP.AUTO: 0.2 E.U./DL
WBC # BLD AUTO: 17.91 THOUSAND/UL (ref 4.31–10.16)

## 2019-04-23 PROCEDURE — 87086 URINE CULTURE/COLONY COUNT: CPT | Performed by: PHYSICIAN ASSISTANT

## 2019-04-23 PROCEDURE — 86787 VARICELLA-ZOSTER ANTIBODY: CPT

## 2019-04-23 PROCEDURE — 36415 COLL VENOUS BLD VENIPUNCTURE: CPT

## 2019-04-23 PROCEDURE — 87491 CHLMYD TRACH DNA AMP PROBE: CPT | Performed by: PHYSICIAN ASSISTANT

## 2019-04-23 PROCEDURE — 87591 N.GONORRHOEAE DNA AMP PROB: CPT | Performed by: PHYSICIAN ASSISTANT

## 2019-04-23 PROCEDURE — 81003 URINALYSIS AUTO W/O SCOPE: CPT | Performed by: PHYSICIAN ASSISTANT

## 2019-04-23 PROCEDURE — G0145 SCR C/V CYTO,THINLAYER,RESCR: HCPCS | Performed by: PHYSICIAN ASSISTANT

## 2019-04-23 PROCEDURE — 99213 OFFICE O/P EST LOW 20 MIN: CPT | Performed by: PHYSICIAN ASSISTANT

## 2019-04-23 PROCEDURE — 80081 OBSTETRIC PANEL INC HIV TSTG: CPT | Performed by: PHYSICIAN ASSISTANT

## 2019-04-23 RX ORDER — FAMOTIDINE 20 MG
1 TABLET ORAL DAILY
Qty: 30 EACH | Refills: 3 | Status: SHIPPED | OUTPATIENT
Start: 2019-04-23 | End: 2019-07-12 | Stop reason: ALTCHOICE

## 2019-04-24 LAB
BACTERIA UR CULT: NORMAL
HIV 1+2 AB+HIV1 P24 AG SERPL QL IA: NORMAL
RPR SER QL: NORMAL

## 2019-04-25 LAB
C TRACH DNA SPEC QL NAA+PROBE: NEGATIVE
N GONORRHOEA DNA SPEC QL NAA+PROBE: NEGATIVE
VZV IGG SER IA-ACNC: NORMAL

## 2019-04-29 LAB
LAB AP GYN PRIMARY INTERPRETATION: NORMAL
LAB AP LMP: NORMAL
Lab: NORMAL

## 2019-05-01 ENCOUNTER — ROUTINE PRENATAL (OUTPATIENT)
Dept: PERINATAL CARE | Facility: CLINIC | Age: 24
End: 2019-05-01
Payer: COMMERCIAL

## 2019-05-01 VITALS
WEIGHT: 175.8 LBS | HEIGHT: 59 IN | SYSTOLIC BLOOD PRESSURE: 111 MMHG | HEART RATE: 89 BPM | DIASTOLIC BLOOD PRESSURE: 78 MMHG | BODY MASS INDEX: 35.44 KG/M2

## 2019-05-01 DIAGNOSIS — Z3A.12 12 WEEKS GESTATION OF PREGNANCY: ICD-10-CM

## 2019-05-01 DIAGNOSIS — Z36.82 NUCHAL TRANSLUCENCY OF FETUS ON PRENATAL ULTRASOUND: Primary | ICD-10-CM

## 2019-05-01 DIAGNOSIS — Z36.9 ENCOUNTER FOR ANTENATAL SCREENING: ICD-10-CM

## 2019-05-01 PROCEDURE — 76813 OB US NUCHAL MEAS 1 GEST: CPT | Performed by: OBSTETRICS & GYNECOLOGY

## 2019-05-08 ENCOUNTER — TELEPHONE (OUTPATIENT)
Dept: PERINATAL CARE | Facility: CLINIC | Age: 24
End: 2019-05-08

## 2019-05-13 ENCOUNTER — ROUTINE PRENATAL (OUTPATIENT)
Dept: OBGYN CLINIC | Facility: CLINIC | Age: 24
End: 2019-05-13
Payer: COMMERCIAL

## 2019-05-13 VITALS — WEIGHT: 179.2 LBS | BODY MASS INDEX: 36.19 KG/M2 | DIASTOLIC BLOOD PRESSURE: 62 MMHG | SYSTOLIC BLOOD PRESSURE: 108 MMHG

## 2019-05-13 DIAGNOSIS — Z3A.14 14 WEEKS GESTATION OF PREGNANCY: Primary | ICD-10-CM

## 2019-05-13 PROCEDURE — 99213 OFFICE O/P EST LOW 20 MIN: CPT | Performed by: OBSTETRICS & GYNECOLOGY

## 2019-05-13 RX ORDER — VITAMIN A, VITAMIN C, VITAMIN D-3, VITAMIN E, VITAMIN B-1, VITAMIN B-2, NIACIN, VITAMIN B-6, CALCIUM, IRON, ZINC, COPPER 4000; 120; 400; 22; 1.84; 3; 20; 10; 1; 12; 200; 27; 25; 2 [IU]/1; MG/1; [IU]/1; MG/1; MG/1; MG/1; MG/1; MG/1; MG/1; UG/1; MG/1; MG/1; MG/1; MG/1
1 TABLET ORAL DAILY
Refills: 3 | COMMUNITY
Start: 2019-05-09 | End: 2019-05-15

## 2019-05-15 ENCOUNTER — HOSPITAL ENCOUNTER (EMERGENCY)
Facility: HOSPITAL | Age: 24
Discharge: HOME | End: 2019-05-15
Attending: EMERGENCY MEDICINE | Admitting: EMERGENCY MEDICINE
Payer: COMMERCIAL

## 2019-05-15 VITALS
BODY MASS INDEX: 36.2 KG/M2 | TEMPERATURE: 98.4 F | RESPIRATION RATE: 17 BRPM | HEART RATE: 89 BPM | SYSTOLIC BLOOD PRESSURE: 142 MMHG | WEIGHT: 179.23 LBS | DIASTOLIC BLOOD PRESSURE: 79 MMHG | OXYGEN SATURATION: 99 %

## 2019-05-15 DIAGNOSIS — O26.899 ABDOMINAL PAIN IN PREGNANCY: Primary | ICD-10-CM

## 2019-05-15 DIAGNOSIS — R10.9 ABDOMINAL PAIN IN PREGNANCY: Primary | ICD-10-CM

## 2019-05-15 LAB
BACTERIA UR QL AUTO: ABNORMAL /HPF
BILIRUB UR QL STRIP: NEGATIVE
CLARITY UR: ABNORMAL
COLOR UR: YELLOW
GLUCOSE UR STRIP-MCNC: NEGATIVE MG/DL
HGB UR QL STRIP.AUTO: ABNORMAL
KETONES UR STRIP-MCNC: NEGATIVE MG/DL
LEUKOCYTE ESTERASE UR QL STRIP: NEGATIVE
NITRITE UR QL STRIP: NEGATIVE
NON-SQ EPI CELLS URNS QL MICRO: ABNORMAL /HPF
PH UR STRIP.AUTO: 5.5 [PH] (ref 4.5–8)
PROT UR STRIP-MCNC: ABNORMAL MG/DL
RBC #/AREA URNS AUTO: ABNORMAL /HPF
SP GR UR STRIP.AUTO: 1.02 (ref 1–1.03)
UROBILINOGEN UR QL STRIP.AUTO: 0.2 E.U./DL
WBC #/AREA URNS AUTO: ABNORMAL /HPF

## 2019-05-15 PROCEDURE — 99283 EMERGENCY DEPT VISIT LOW MDM: CPT | Performed by: EMERGENCY MEDICINE

## 2019-05-15 PROCEDURE — 87086 URINE CULTURE/COLONY COUNT: CPT

## 2019-05-15 PROCEDURE — 81001 URINALYSIS AUTO W/SCOPE: CPT

## 2019-05-15 PROCEDURE — 99284 EMERGENCY DEPT VISIT MOD MDM: CPT

## 2019-05-16 LAB — BACTERIA UR CULT: NORMAL

## 2019-06-06 ENCOUNTER — HOSPITAL ENCOUNTER (OUTPATIENT)
Facility: HOSPITAL | Age: 24
Discharge: HOME | End: 2019-06-06
Attending: OBSTETRICS & GYNECOLOGY | Admitting: OBSTETRICS & GYNECOLOGY
Payer: COMMERCIAL

## 2019-06-06 ENCOUNTER — OFFICE VISIT (OUTPATIENT)
Dept: URGENT CARE | Age: 24
End: 2019-06-06
Payer: COMMERCIAL

## 2019-06-06 VITALS
HEART RATE: 112 BPM | BODY MASS INDEX: 36.08 KG/M2 | SYSTOLIC BLOOD PRESSURE: 110 MMHG | RESPIRATION RATE: 18 BRPM | OXYGEN SATURATION: 100 % | DIASTOLIC BLOOD PRESSURE: 70 MMHG | HEIGHT: 59 IN | WEIGHT: 179 LBS | TEMPERATURE: 97.8 F

## 2019-06-06 VITALS
DIASTOLIC BLOOD PRESSURE: 60 MMHG | OXYGEN SATURATION: 100 % | SYSTOLIC BLOOD PRESSURE: 103 MMHG | RESPIRATION RATE: 17 BRPM | HEART RATE: 93 BPM | HEIGHT: 59 IN | TEMPERATURE: 98.5 F | WEIGHT: 174 LBS | BODY MASS INDEX: 35.08 KG/M2

## 2019-06-06 DIAGNOSIS — R11.0 NAUSEA: ICD-10-CM

## 2019-06-06 DIAGNOSIS — R10.9 ABDOMINAL PAIN DURING PREGNANCY, ANTEPARTUM: Primary | ICD-10-CM

## 2019-06-06 DIAGNOSIS — O26.899 ABDOMINAL PAIN DURING PREGNANCY, ANTEPARTUM: Primary | ICD-10-CM

## 2019-06-06 DIAGNOSIS — E86.0 DEHYDRATION: ICD-10-CM

## 2019-06-06 DIAGNOSIS — R19.7 DIARRHEA IN ADULT PATIENT: Primary | ICD-10-CM

## 2019-06-06 LAB
ALBUMIN SERPL BCP-MCNC: 2.6 G/DL (ref 3.5–5)
ALP SERPL-CCNC: 80 U/L (ref 46–116)
ALT SERPL W P-5'-P-CCNC: 103 U/L (ref 12–78)
ANION GAP SERPL CALCULATED.3IONS-SCNC: 7 MMOL/L (ref 4–13)
AST SERPL W P-5'-P-CCNC: 63 U/L (ref 5–45)
BILIRUB SERPL-MCNC: 0.21 MG/DL (ref 0.2–1)
BILIRUB UR QL STRIP: NEGATIVE
BUN SERPL-MCNC: 8 MG/DL (ref 5–25)
CALCIUM SERPL-MCNC: 9.2 MG/DL (ref 8.3–10.1)
CHLORIDE SERPL-SCNC: 102 MMOL/L (ref 100–108)
CLARITY UR: CLEAR
CO2 SERPL-SCNC: 28 MMOL/L (ref 21–32)
COLOR UR: YELLOW
CREAT SERPL-MCNC: 0.71 MG/DL (ref 0.6–1.3)
GFR SERPL CREATININE-BSD FRML MDRD: 120 ML/MIN/1.73SQ M
GLUCOSE SERPL-MCNC: 73 MG/DL (ref 65–140)
GLUCOSE UR STRIP-MCNC: NEGATIVE MG/DL
HGB UR QL STRIP.AUTO: NEGATIVE
KETONES UR STRIP-MCNC: NEGATIVE MG/DL
LEUKOCYTE ESTERASE UR QL STRIP: NEGATIVE
NITRITE UR QL STRIP: NEGATIVE
PH UR STRIP.AUTO: 6 [PH]
POTASSIUM SERPL-SCNC: 3.2 MMOL/L (ref 3.5–5.3)
PROT SERPL-MCNC: 7.1 G/DL (ref 6.4–8.2)
PROT UR STRIP-MCNC: NEGATIVE MG/DL
SODIUM SERPL-SCNC: 137 MMOL/L (ref 136–145)
SP GR UR STRIP.AUTO: <=1.005 (ref 1–1.03)
UROBILINOGEN UR QL STRIP.AUTO: 0.2 E.U./DL

## 2019-06-06 PROCEDURE — 87086 URINE CULTURE/COLONY COUNT: CPT | Performed by: OBSTETRICS & GYNECOLOGY

## 2019-06-06 PROCEDURE — 81003 URINALYSIS AUTO W/O SCOPE: CPT | Performed by: OBSTETRICS & GYNECOLOGY

## 2019-06-06 PROCEDURE — 80053 COMPREHEN METABOLIC PANEL: CPT | Performed by: OBSTETRICS & GYNECOLOGY

## 2019-06-06 PROCEDURE — G0382 LEV 3 HOSP TYPE B ED VISIT: HCPCS | Performed by: PHYSICIAN ASSISTANT

## 2019-06-06 PROCEDURE — 99283 EMERGENCY DEPT VISIT LOW MDM: CPT | Performed by: PHYSICIAN ASSISTANT

## 2019-06-06 PROCEDURE — 99203 OFFICE O/P NEW LOW 30 MIN: CPT | Performed by: PHYSICIAN ASSISTANT

## 2019-06-06 PROCEDURE — NC001 PR NO CHARGE: Performed by: OBSTETRICS & GYNECOLOGY

## 2019-06-06 PROCEDURE — 99212 OFFICE O/P EST SF 10 MIN: CPT

## 2019-06-06 RX ORDER — ONDANSETRON 4 MG/1
4 TABLET, ORALLY DISINTEGRATING ORAL EVERY 6 HOURS PRN
Status: DISCONTINUED | OUTPATIENT
Start: 2019-06-06 | End: 2019-06-06 | Stop reason: HOSPADM

## 2019-06-06 RX ORDER — ONDANSETRON 4 MG/1
4 TABLET, FILM COATED ORAL EVERY 8 HOURS PRN
Qty: 30 TABLET | Refills: 0 | Status: SHIPPED | OUTPATIENT
Start: 2019-06-06 | End: 2019-06-28

## 2019-06-06 RX ADMIN — ONDANSETRON 4 MG: 4 TABLET, ORALLY DISINTEGRATING ORAL at 19:53

## 2019-06-06 RX ADMIN — SODIUM CHLORIDE, SODIUM LACTATE, POTASSIUM CHLORIDE, AND CALCIUM CHLORIDE 1000 ML: .6; .31; .03; .02 INJECTION, SOLUTION INTRAVENOUS at 19:37

## 2019-06-08 LAB — BACTERIA UR CULT: NORMAL

## 2019-06-10 ENCOUNTER — LAB (OUTPATIENT)
Dept: LAB | Facility: HOSPITAL | Age: 24
End: 2019-06-10
Attending: OBSTETRICS & GYNECOLOGY
Payer: COMMERCIAL

## 2019-06-10 ENCOUNTER — TRANSCRIBE ORDERS (OUTPATIENT)
Dept: LAB | Facility: HOSPITAL | Age: 24
End: 2019-06-10

## 2019-06-10 ENCOUNTER — ROUTINE PRENATAL (OUTPATIENT)
Dept: OBGYN CLINIC | Facility: CLINIC | Age: 24
End: 2019-06-10
Payer: COMMERCIAL

## 2019-06-10 VITALS
DIASTOLIC BLOOD PRESSURE: 64 MMHG | WEIGHT: 176.5 LBS | BODY MASS INDEX: 35.58 KG/M2 | HEIGHT: 59 IN | SYSTOLIC BLOOD PRESSURE: 100 MMHG

## 2019-06-10 DIAGNOSIS — Z36.9 UNSPECIFIED ANTENATAL SCREENING: ICD-10-CM

## 2019-06-10 DIAGNOSIS — Z33.1 PREGNANT STATE, INCIDENTAL: ICD-10-CM

## 2019-06-10 DIAGNOSIS — Z33.1 PREGNANT STATE, INCIDENTAL: Primary | ICD-10-CM

## 2019-06-10 DIAGNOSIS — R19.7 DIARRHEA IN ADULT PATIENT: ICD-10-CM

## 2019-06-10 DIAGNOSIS — Z3A.18 18 WEEKS GESTATION OF PREGNANCY: Primary | ICD-10-CM

## 2019-06-10 PROCEDURE — 36415 COLL VENOUS BLD VENIPUNCTURE: CPT

## 2019-06-10 PROCEDURE — 99213 OFFICE O/P EST LOW 20 MIN: CPT | Performed by: OBSTETRICS & GYNECOLOGY

## 2019-06-11 LAB — SCAN RESULT: NORMAL

## 2019-06-18 ENCOUNTER — TELEPHONE (OUTPATIENT)
Dept: PERINATAL CARE | Facility: CLINIC | Age: 24
End: 2019-06-18

## 2019-06-28 ENCOUNTER — ROUTINE PRENATAL (OUTPATIENT)
Dept: PERINATAL CARE | Facility: CLINIC | Age: 24
End: 2019-06-28
Payer: COMMERCIAL

## 2019-06-28 VITALS
BODY MASS INDEX: 36.08 KG/M2 | HEIGHT: 59 IN | WEIGHT: 179 LBS | HEART RATE: 98 BPM | SYSTOLIC BLOOD PRESSURE: 102 MMHG | DIASTOLIC BLOOD PRESSURE: 71 MMHG

## 2019-06-28 DIAGNOSIS — Z3A.21 21 WEEKS GESTATION OF PREGNANCY: ICD-10-CM

## 2019-06-28 DIAGNOSIS — Z36.86 ENCOUNTER FOR ANTENATAL SCREENING FOR CERVICAL LENGTH: ICD-10-CM

## 2019-06-28 DIAGNOSIS — O99.210 OBESITY AFFECTING PREGNANCY, ANTEPARTUM: ICD-10-CM

## 2019-06-28 DIAGNOSIS — Z36.3 ENCOUNTER FOR ANTENATAL SCREENING FOR MALFORMATIONS: Primary | ICD-10-CM

## 2019-06-28 PROCEDURE — 76817 TRANSVAGINAL US OBSTETRIC: CPT | Performed by: OBSTETRICS & GYNECOLOGY

## 2019-06-28 PROCEDURE — 76811 OB US DETAILED SNGL FETUS: CPT | Performed by: OBSTETRICS & GYNECOLOGY

## 2019-07-08 ENCOUNTER — ROUTINE PRENATAL (OUTPATIENT)
Dept: OBGYN CLINIC | Facility: CLINIC | Age: 24
End: 2019-07-08
Payer: COMMERCIAL

## 2019-07-08 VITALS
SYSTOLIC BLOOD PRESSURE: 104 MMHG | DIASTOLIC BLOOD PRESSURE: 72 MMHG | WEIGHT: 180 LBS | BODY MASS INDEX: 36.29 KG/M2 | HEIGHT: 59 IN

## 2019-07-08 DIAGNOSIS — Z3A.22 22 WEEKS GESTATION OF PREGNANCY: Primary | ICD-10-CM

## 2019-07-08 PROCEDURE — 99213 OFFICE O/P EST LOW 20 MIN: CPT | Performed by: OBSTETRICS & GYNECOLOGY

## 2019-07-08 RX ORDER — VITAMIN A, VITAMIN C, VITAMIN D-3, VITAMIN E, VITAMIN B-1, VITAMIN B-2, NIACIN, VITAMIN B-6, CALCIUM, IRON, ZINC, COPPER 4000; 120; 400; 22; 1.84; 3; 20; 10; 1; 12; 200; 27; 25; 2 [IU]/1; MG/1; [IU]/1; MG/1; MG/1; MG/1; MG/1; MG/1; MG/1; UG/1; MG/1; MG/1; MG/1; MG/1
1 TABLET ORAL DAILY
Refills: 3 | COMMUNITY
Start: 2019-06-22 | End: 2019-09-24 | Stop reason: SDUPTHER

## 2019-07-10 NOTE — PROGRESS NOTES
Assessment/Plan:    Allergic rhinitis  Continue loratadine as needed for symptoms  Upper back pain  Will refer to PT for evaluation and treatment  Discussed applying heat 20 minutes three times a day  Discussed wearing a sports bra for extra support  Discussed taking Tylenol OTC PRN  Stress  Referred to psychology for counseling  Diagnoses and all orders for this visit:    Upper back pain  -     Ambulatory referral to Physical Therapy; Future    Stress  -     Ambulatory referral to Psychology; Future    Allergic rhinitis, unspecified seasonality, unspecified trigger  -     loratadine (CLARITIN) 10 mg tablet; Take 1 tablet (10 mg total) by mouth daily          Subjective: New patient complaining of back pain, and seasonal allergies     Health Maintenance Due   Topic Date Due    INFLUENZA VACCINE  07/01/2019          Patient ID: Rosa Mar is a 21 y o  female  Patient presents to clinic today to establish care  She is currently under the care of obstetrics for her pregnancy  She is 23 weeks along currently  She notes having increased stress at home, and would like to be referred to a counselor  She notes that the obstetrician offered her medication, but she did not want to take medication like that when pregnant  She also notes that her breasts have enlarged with the pregnancy, and she is now having upper back pains  She sleeps with extra pillows, and takes Tylenol OTC PRN  She does not wear a bra at home due to pain  She also was noted to have some allergy symptoms when she was seen in the ER in April  She was prescribed loratadine, which worked well for her symptoms  She only has symptoms occasionally at this point        The following portions of the patient's history were reviewed and updated as appropriate: allergies, current medications, past family history, past medical history, past social history, past surgical history and problem list     Review of Systems   Musculoskeletal: Positive for back pain  Allergic/Immunologic: Positive for environmental allergies  Psychiatric/Behavioral: The patient is nervous/anxious  All other systems reviewed and are negative  Objective:      /70 (BP Location: Left arm, Patient Position: Sitting, Cuff Size: Adult)   Pulse 90   Temp 97 8 °F (36 6 °C) (Oral)   Resp 16   Ht 5' (1 524 m)   Wt 81 2 kg (179 lb)   LMP 12/28/2018   SpO2 100%   BMI 34 96 kg/m²          Physical Exam   Constitutional: She is oriented to person, place, and time  She appears well-developed and well-nourished  She is cooperative  HENT:   Head: Normocephalic and atraumatic  Right Ear: Hearing, tympanic membrane, external ear and ear canal normal    Left Ear: Hearing, tympanic membrane, external ear and ear canal normal    Mouth/Throat: Uvula is midline, oropharynx is clear and moist and mucous membranes are normal    Eyes: Conjunctivae and lids are normal    Neck: Trachea normal and normal range of motion  Neck supple  No thyromegaly present  Cardiovascular: Normal rate, regular rhythm and normal heart sounds  No murmur heard  Pulmonary/Chest: Effort normal and breath sounds normal  She has no wheezes  She has no rhonchi  She has no rales  Musculoskeletal: Normal range of motion  Right shoulder: She exhibits normal range of motion and normal strength  Left shoulder: She exhibits normal range of motion and normal strength  Cervical back: She exhibits tenderness  She exhibits normal range of motion and no bony tenderness  Right hand: She exhibits normal range of motion  Normal strength noted  Left hand: She exhibits normal range of motion  Normal strength noted  Lymphadenopathy:     She has no cervical adenopathy  Neurological: She is alert and oriented to person, place, and time  She exhibits normal muscle tone  Gait normal    Skin: Skin is warm, dry and intact  Psychiatric: She has a normal mood and affect  Her speech is normal and behavior is normal    Nursing note and vitals reviewed

## 2019-07-11 NOTE — PATIENT INSTRUCTIONS
Patient is here today for her routine 22 week obstetrical visit     She states that she is feeling well and has no complaints at this time      Baby is moving well      She denies any bleeding or loss of fluid or any abdominal pain       She had a level 2 scan at the  center recently which was within normal limits     Blood pressure is normal today and urine screens are all negative     I encouraged her to continue to eat well and also to take adequate amounts of fluid on a daily basis     She is taking her prenatal vitamins daily    Will order 28 week labs at her next visit     All questions were answered for her      We will see her back in 4 weeks for routine prenatal visit     She was told to call should any problems or concerns arise during the interim

## 2019-07-11 NOTE — PROGRESS NOTES
Assessment     Pregnancy 22 and 3/7 weeks     Plan     OBGCT: discussed  Follow up in 4 weeks  Patient is here today for her routine 22 week obstetrical visit     She states that she is feeling well and has no complaints at this time      Baby is moving well      She denies any bleeding or loss of fluid or any abdominal pain       She had a level 2 scan at the  center recently which was within normal limits     Blood pressure is normal today and urine screens are all negative     I encouraged her to continue to eat well and also to take adequate amounts of fluid on a daily basis     She is taking her prenatal vitamins daily    Will order 28 week labs at her next visit     All questions were answered for her      We will see her back in 4 weeks for routine prenatal visit     She was told to call should any problems or concerns arise during the interim    Subjective     Jennifer Camarillo is a 21 y o  female being seen today for her obstetrical visit  She is at 22w6d gestation  Patient reports no bleeding, no cramping and no leaking  Fetal movement: normal     Menstrual History:  OB History        1    Para        Term                AB        Living           SAB        TAB        Ectopic        Multiple        Live Births                    Menarche age:   Patient's last menstrual period was 2018  The following portions of the patient's history were reviewed and updated as appropriate: allergies, current medications, past family history, past medical history, past social history, past surgical history and problem list     Review of Systems  Pertinent items are noted in HPI       Objective      /72   Ht 4' 11" (1 499 m)   Wt 81 6 kg (180 lb)   LMP 2018   BMI 36 36 kg/m²   FHT: 154 BPM   Uterine Size: size equals dates

## 2019-07-12 ENCOUNTER — OFFICE VISIT (OUTPATIENT)
Dept: FAMILY MEDICINE CLINIC | Facility: CLINIC | Age: 24
End: 2019-07-12
Payer: COMMERCIAL

## 2019-07-12 VITALS
HEIGHT: 60 IN | OXYGEN SATURATION: 100 % | HEART RATE: 90 BPM | DIASTOLIC BLOOD PRESSURE: 70 MMHG | WEIGHT: 179 LBS | SYSTOLIC BLOOD PRESSURE: 100 MMHG | BODY MASS INDEX: 35.14 KG/M2 | RESPIRATION RATE: 16 BRPM | TEMPERATURE: 97.8 F

## 2019-07-12 DIAGNOSIS — M54.9 UPPER BACK PAIN: Primary | ICD-10-CM

## 2019-07-12 DIAGNOSIS — F43.9 STRESS: ICD-10-CM

## 2019-07-12 DIAGNOSIS — J30.9 ALLERGIC RHINITIS, UNSPECIFIED SEASONALITY, UNSPECIFIED TRIGGER: ICD-10-CM

## 2019-07-12 PROCEDURE — 99203 OFFICE O/P NEW LOW 30 MIN: CPT | Performed by: FAMILY MEDICINE

## 2019-07-12 PROCEDURE — 3008F BODY MASS INDEX DOCD: CPT | Performed by: FAMILY MEDICINE

## 2019-07-12 RX ORDER — LORATADINE 10 MG/1
10 TABLET ORAL DAILY
Qty: 30 TABLET | Refills: 1 | Status: SHIPPED | OUTPATIENT
Start: 2019-07-12

## 2019-07-12 NOTE — PATIENT INSTRUCTIONS
Stress   AMBULATORY CARE:   Stress  is a feeling of tension or strain related to the events and pressures of everyday life  Learn to cope and control your stress to help you function in a healthy way  Stress can be caused by many different things, including any of the following:  · Loss of a loved one or a job    · Life events, such as having a baby, buying a house, or getting a divorce    · Medical conditions, such as an acute or long-term illness or a new diagnosis  Common symptoms of too much stress include the following:   · Emotional:      ¨ Crying    ¨ Anxiety or nervousness    ¨ Easily upset    ¨ Edgy, angry, or impatient    ¨ Feeling overwhelmed    · Physical:      ¨ Headaches    ¨ Tiredness    ¨ Feeling restless    ¨ Sleep problems    ¨ Heartburn    · Mental:      ¨ Trouble thinking clearly or making decisions    ¨ Memory loss or forgetfulness    ¨ Constant worry    · Social:      ¨ Substance abuse    ¨ Overeating    ¨ Isolation or withdrawal from others    ¨ Decreased desire for sexual intimacy    ¨ Feeling bitter, resentful, or impatient with others  Call 911 for any of the following:   · You feel like hurting yourself or someone else  · You feel you are overwhelmed and can no longer handle things by yourself  Contact your healthcare provider if:   · You have trouble coping with your stress  · Your symptoms cause problems in your relationships  · You feel depressed  · You have trouble controlling your anger  · You have started to use alcohol, illegal drugs, or prescription medicines, or you increase your current use  · You have questions or concerns about your condition or care  Ways to manage your stress:  Learn what causes you stress  Not all stress can be avoided  Instead, change how you cope with stress by doing any of the following:  · Learn relaxation techniques, such as yoga, meditation, or listening to music  Take at least 30 minutes a day to do something you enjoy   This may include taking a bath or reading a book  · Do deep breathing exercises during times of increased stress  Sit up straight and take a slow, deep breath in through your nose  Then breathe out slowly through your mouth  Take twice as long to breathe out as you do when you breathe in  Repeat this a few times until you feel calmer or more focused  · Set realistic goals for yourself  Make a list of tasks and prioritize them  Focus on one task at a time  · Talk to someone about things that upset you  Talk to a trusted friend, family member, or support group  Try to stop yourself when you think negative, angry, or discouraging thoughts  · Take time to exercise  Start slowly, such as walking 1 to 2 blocks each day  Stretch and relax your muscles often  Ask about the best exercise plan for you  · Eat a variety of healthy foods  Healthy foods include fruits, vegetables, whole-grain breads, low-fat dairy products, beans, lean meats, and fish  Follow up with your healthcare provider as directed:  Write down your questions so you remember to ask them during your visits  © 2017 [facility] Information is for End User's use only and may not be sold, redistributed or otherwise used for commercial purposes  All illustrations and images included in CareNotes® are the copyrighted property of [employer]  or [employer]  The above information is an  only  It is not intended as medical advice for individual conditions or treatments  Talk to your doctor, nurse or pharmacist before following any medical regimen to see if it is safe and effective for you

## 2019-07-12 NOTE — ASSESSMENT & PLAN NOTE
Will refer to PT for evaluation and treatment  Discussed applying heat 20 minutes three times a day  Discussed wearing a sports bra for extra support  Discussed taking Tylenol OTC PRN

## 2019-07-30 ENCOUNTER — DOCUMENTATION (OUTPATIENT)
Dept: BEHAVIORAL/MENTAL HEALTH CLINIC | Facility: CLINIC | Age: 24
End: 2019-07-30

## 2019-07-31 ENCOUNTER — EVALUATION (OUTPATIENT)
Dept: PHYSICAL THERAPY | Facility: CLINIC | Age: 24
End: 2019-07-31
Payer: COMMERCIAL

## 2019-07-31 DIAGNOSIS — M54.9 UPPER BACK PAIN: Primary | ICD-10-CM

## 2019-07-31 PROCEDURE — 97110 THERAPEUTIC EXERCISES: CPT | Performed by: PHYSICAL THERAPIST

## 2019-07-31 PROCEDURE — 97162 PT EVAL MOD COMPLEX 30 MIN: CPT | Performed by: PHYSICAL THERAPIST

## 2019-07-31 NOTE — PROGRESS NOTES
PT Evaluation     Today's date: 2019  Patient name: Carina Han  : 1995  MRN: [id number]  Referring provider: Suzie Hill MD  Dx:   Encounter Diagnosis     ICD-10-CM    1  Upper back pain M54 9 Ambulatory referral to Physical Therapy                  Assessment  Assessment details: Carina Han is a 21 y o  female who presents to physical therapy with Upper back pain  Pt is currently 25 weeks pregnant with baby girl,   Pt has difficulty with performing housework, bending, lifting, prolonged walking, and sitting  Pt is currently out of work as RN  Pt demonstrates some shortness of breath during subjective portion and notes anterior placenta  Pt has difficulty with flexion and has pain with bed mobility and transfers  Pt has deficits in hip ER strength but denies tenderness over glutes and piriformis  Carina Han would benefit from formal physical therapy to address impairments as detailed, decrease pain, and restore maximal level of function for all home, work, and mobility tasks  Thank you for this referral     Impairments: abnormal gait, abnormal or restricted ROM, impaired physical strength, lacks appropriate home exercise program, pain with function, poor posture  and poor body mechanics  Understanding of Dx/Px/POC: good   Prognosis: good    Goals  Short-term goals:  1  Pt will decrease pain by 1-2 points within 4 weeks  2  Pt will improve ROM by 5-10 degrees within 4 weeks  3  Pt will improve strength by 1/2 grade within 4 weeks  4  Pt will improve physical FS score by 10 points by discharge  Long-term goals:  1  Pt will demonstrate independence with HEP by discharge  2  Pt will return to all home tasks with good body mechanics and pain <3/10 by discharge  3  Pt will demonstrate good body mechanics with progression of pregnancy without cuing by discharge      Plan  Patient would benefit from: PT eval and skilled PT  Planned modality interventions: cryotherapy and thermotherapy: hydrocollator packs  Planned therapy interventions: joint mobilization, manual therapy, neuromuscular re-education, patient education, strengthening, stretching, therapeutic exercise, flexibility, functional ROM exercises, home exercise program, abdominal trunk stabilization, behavior modification, body mechanics training, postural training, therapeutic activities and massage  Frequency: 2x week  Duration in weeks: 4  Treatment plan discussed with: patient        Subjective Evaluation    History of Present Illness  Mechanism of injury: 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, particularly with being a CNA  Pt reports pain worst in thoracic spine  Pt notes rapid breast growth early in pregnancy  Pt reports that she has new onset of low back pain across SI joints and radiating posteriorly to above R knee  Pt notes increase in pain after moving a lot or repetitive bending  Pt is in the process of moving  Pt can climb stairs reciprocally but climbs in step to pattern when fatigued  Pt denies changes in bowel/bladder function  Pt denies numbness and tingling into legs  Pt is 25 weeks into her first pregnancy  Pt reports occasional jailene horses in her legs  Pt had migraines, shingles, and morning sickness during first trimester  Pt is no longer on light duty  Pt has difficulty with cleaning her house, getting out of bed, walking a long time, getting up and down a lot  Pt was not placed on any restrictions  Pt has anterior placenta  Pt states that she feels baby kick toward bladder and back  Pt reports occasional soreness in through groin    Pain  At best pain ratin  At worst pain ratin  Location: low back, SI  Quality: sharp and dull ache  Relieving factors: rest  Aggravating factors: stair climbing and walking    Social Support  Lives with: parents (sister also)    Employment status: working (RN, currently not working)    Diagnostic Tests    FCE comments: Ultrasound- polycystic ovaryPatient Goals  Patient goal: strengthening my back        Objective     Static Posture     Comments  Increased lordosis with pregnancy    Neurological Testing     Sensation     Lumbar   Left   Intact: light touch    Right   Intact: light touch    Reflexes   Left   Patellar (L4): trace (1+)  Achilles (S1): normal (2+)  Clonus sign: negative    Right   Patellar (L4): absent (0)  Achilles (S1): normal (2+)  Clonus sign: negative    Active Range of Motion     Lumbar   Flexion: 70 (pain in B knees) degrees  with pain  Extension: 40 (stretching in belly) degrees   Left lateral flexion: 34 degrees       Right lateral flexion: 34 degrees   Left rotation: Active left lumbar rotation: 100%   Right rotation: Active right lumbar rotation: 100%     Strength/Myotome Testing     Left Hip   Planes of Motion   Flexion: 5  External rotation: 4 (pain)  Internal rotation: 5    Right Hip   Planes of Motion   Flexion: 5  External rotation: 4 (pain)  Internal rotation: 5    Left Knee   Flexion: 5  Extension: 5    Right Knee   Flexion: 5  Extension: 5    Left Ankle/Foot   Dorsiflexion: 5  Plantar flexion: 5  Inversion: 5  Eversion: 5    Right Ankle/Foot   Dorsiflexion: 5  Plantar flexion: 5  Inversion: 5  Eversion: 4    Tests     Lumbar     Left   Negative crossed SLR, passive SLR and slump test      Right   Negative crossed SLR, passive SLR and slump test      Left Hip   Negative RENETTA and FADIR  Right Hip   Positive RENETTA  Negative FADIR  Ambulation     Observational Gait   Gait: antalgic   Decreased walking speed  Base of support: increased    Functional Assessment      Squat    Pain, left tibial anterior translation beyond toes and right tibial anterior translation beyond toes                Precautions: 2nd trimester (25 weeks at IE, due 11/8/19), migraines, depression      Manual  7/31            BP Pre-tx 106/70 mmHg            STM to lumbar PSM in supported lumbar flexion nv Exercise Diary  7/31            Rolling under reviewed            Chin tucks 3"x5            scap retraction 3"x5            UBE retro nv            TB shoulder ext nv            TB low rows nv            Wall sags nv            APT/PPT nv            TA iso nv            TA + ball squeeze nv            TA + hip TB ab nv            TA + TB multifidus push/pull nv                                                                                                                        Modalities  7/31            MH w/ extra towels Prn nv

## 2019-08-05 ENCOUNTER — OFFICE VISIT (OUTPATIENT)
Dept: PHYSICAL THERAPY | Facility: CLINIC | Age: 24
End: 2019-08-05
Payer: COMMERCIAL

## 2019-08-05 DIAGNOSIS — M54.9 UPPER BACK PAIN: Primary | ICD-10-CM

## 2019-08-05 PROCEDURE — 97112 NEUROMUSCULAR REEDUCATION: CPT

## 2019-08-05 PROCEDURE — 97140 MANUAL THERAPY 1/> REGIONS: CPT

## 2019-08-05 PROCEDURE — 97110 THERAPEUTIC EXERCISES: CPT

## 2019-08-05 NOTE — PROGRESS NOTES
Daily Note     Today's date: 2019  Patient name: Dorothea Medina  : 1995  MRN: [id number]  Referring provider: Martine Martin MD  Dx:   Encounter Diagnosis     ICD-10-CM    1  Upper back pain M54 9        Start Time: 1105  Stop Time: 1205  Total time in clinic (min): 60 minutes    Subjective: Pt reports she is feeling some achieness in her back and in her hips and groin  Pt reports she notices the pain more throughout the day  Objective: See treatment diary below      Assessment: Tolerated treatment well  Patient demonstrated fatigue post treatment and would benefit from continued PT  Pt performed exercises as noted with no signs of increased thoracic pain  Pt reported minimal knee pain throughout session  Pt shows moderate relief in symptoms post manual therapy  Pt educated on importance of posture to relief symptoms  Plan: Continue per plan of care        Precautions: 2nd trimester (25 weeks at IE, due 19), migraines, depression      Manual             BP Pre-tx 106/70 mmHg 107/73 mmHg           STM to lumbar PSM in supported lumbar flexion nv HY                                                      Exercise Diary             Rolling under reviewed reviewed           Chin tucks 3"x5 10x5"           scap retraction 3"x5 10x5"           UBE retro nv 5'           TB shoulder ext nv OTB 10x5"            TB low rows nv OTB 10x5"           Wall sags nv nv           APT/PPT nv 10x ea           TA iso nv 10x5"           TA + ball squeeze nv 10x5"           TA + hip TB ab nv OTB 10x5"           TA + TB multifidus push/pull nv GTB 10x ea                                                                                                                       Modalities             MH w/ extra towels Prn nv declined

## 2019-08-06 ENCOUNTER — ROUTINE PRENATAL (OUTPATIENT)
Dept: OBGYN CLINIC | Facility: CLINIC | Age: 24
End: 2019-08-06
Payer: COMMERCIAL

## 2019-08-06 VITALS
HEIGHT: 59 IN | WEIGHT: 182 LBS | SYSTOLIC BLOOD PRESSURE: 110 MMHG | DIASTOLIC BLOOD PRESSURE: 68 MMHG | BODY MASS INDEX: 36.69 KG/M2

## 2019-08-06 DIAGNOSIS — Z3A.26 26 WEEKS GESTATION OF PREGNANCY: ICD-10-CM

## 2019-08-06 DIAGNOSIS — O99.210 OBESITY AFFECTING PREGNANCY, ANTEPARTUM: ICD-10-CM

## 2019-08-06 DIAGNOSIS — Z3A.28 28 WEEKS GESTATION OF PREGNANCY: Primary | ICD-10-CM

## 2019-08-06 PROCEDURE — 99213 OFFICE O/P EST LOW 20 MIN: CPT | Performed by: OBSTETRICS & GYNECOLOGY

## 2019-08-06 NOTE — PROGRESS NOTES
Assessment     Pregnancy 26 and 4/7 weeks     Plan     OBGCT: ordered  Follow up in 2 weeks  Patient is here today for her routine 26 week obstetrical visit     She states that she is feeling well and has no complaints at this time      Baby is moving well      She denies any bleeding or loss of fluid or any abdominal pain       She had a level 2 scan at the  center recently which was within normal limits     Blood pressure is normal today and urine screens are all negative     I encouraged her to continue to eat well and also to take adequate amounts of fluid on a daily basis     She is taking her prenatal vitamins daily    Patient feeling more pelvic discomfort and pubic bone stretching at this time  She also is feeling round ligament inguinal discomfort as well  She complains of some lower back discomfort and I advised her to use warm heat and/or warm showers  She is also receiving physical therapy as well  Slip for 28 week labs given the patient today  She will be seen a little bit sooner because of her various complaints      All questions were answered for her      We will see her back in 2 weeks for routine prenatal visit     She was told to call should any problems or concerns arise during the interim      Kevin     Olivia Woodard is a 21 y o  female being seen today for her obstetrical visit  She is at 26w4d gestation  Patient reports backache, fatigue, no bleeding, no leaking and occasional contractions  Fetal movement: normal     Menstrual History:  OB History        1    Para        Term                AB        Living           SAB        TAB        Ectopic        Multiple        Live Births                    Menarche age: 3  Patient's last menstrual period was 2018         The following portions of the patient's history were reviewed and updated as appropriate: allergies, current medications, past family history, past medical history, past social history, past surgical history and problem list     Review of Systems  Pertinent items are noted in HPI       Objective      /68   Ht 4' 11" (1 499 m)   Wt 82 6 kg (182 lb)   LMP 12/28/2018   BMI 36 76 kg/m²   FHT: 148 BPM   Uterine Size: size equals dates

## 2019-08-06 NOTE — PATIENT INSTRUCTIONS
Patient is here today for her routine 26 week obstetrical visit     She states that she is feeling well and has no complaints at this time      Baby is moving well      She denies any bleeding or loss of fluid or any abdominal pain       She had a level 2 scan at the  center recently which was within normal limits     Blood pressure is normal today and urine screens are all negative     I encouraged her to continue to eat well and also to take adequate amounts of fluid on a daily basis     She is taking her prenatal vitamins daily    Patient feeling more pelvic discomfort and pubic bone stretching at this time  She is also feeling round ligament inguinal discomfort as well  She complains of some lower back discomfort and I advised her to use warm heat and/or warm showers  She is also receiving physical therapy as well  Slip for 28 week labs given to the patient today    She will be seen a little bit sooner because of her various complaints      All questions were answered for her      We will see her back in 2 weeks for routine prenatal visit     She was told to call should any problems or concerns arise during the interim

## 2019-08-09 ENCOUNTER — OFFICE VISIT (OUTPATIENT)
Dept: PHYSICAL THERAPY | Facility: CLINIC | Age: 24
End: 2019-08-09
Payer: COMMERCIAL

## 2019-08-09 DIAGNOSIS — M54.9 UPPER BACK PAIN: Primary | ICD-10-CM

## 2019-08-09 PROCEDURE — 97140 MANUAL THERAPY 1/> REGIONS: CPT

## 2019-08-09 PROCEDURE — 97110 THERAPEUTIC EXERCISES: CPT

## 2019-08-09 PROCEDURE — 97112 NEUROMUSCULAR REEDUCATION: CPT

## 2019-08-09 NOTE — PROGRESS NOTES
Daily Note     Today's date: 2019  Patient name: Hugo Orozco  : 1995  MRN: [id number]  Referring provider: Pratibha Rodrigez MD  Dx:   Encounter Diagnosis     ICD-10-CM    1  Upper back pain M54 9                   Subjective: Pt reports B groin pain and upper back pain grading both areas a 3-4/10  Pt denies pain post PT session      Objective: See treatment diary below      Assessment: Tolerated treatment well  Patient demonstrated fatigue post treatment, exhibited good technique with therapeutic exercises and would benefit from continued PT  Plan: Continue per plan of care        Precautions: 2nd trimester (25 weeks at IE, due 19), migraines, depression      Manual            BP Pre-tx 106/70 mmHg 107/73 mmHg 104/68 mmHg, L UE          STM to lumbar PSM in supported lumbar flexion nv HY PK                                                     Exercise Diary            Rolling under reviewed reviewed np          Chin tucks 3"x5 10x5" 10x5"          scap retraction 3"x5 10x5" 10x5"          UBE retro nv 5' 5'          TB shoulder ext nv OTB 10x5"  OTB 15x5"          TB low rows nv OTB 10x5" OTB 15x5"          Wall sags nv nv nv          APT/PPT nv 10x ea 10x ea          TA iso nv 10x5" 15x5"          TA + ball squeeze nv 10x5" 15x5"          TA + hip TB ab nv OTB 10x5" OTB 15x5"          TA + TB multifidus push/pull nv GTB 10x ea GTB 10x ea                                                                                                                      Modalities            MH w/ extra towels Prn nv declined declined

## 2019-08-13 ENCOUNTER — OFFICE VISIT (OUTPATIENT)
Dept: PHYSICAL THERAPY | Facility: CLINIC | Age: 24
End: 2019-08-13
Payer: COMMERCIAL

## 2019-08-13 DIAGNOSIS — M54.9 UPPER BACK PAIN: Primary | ICD-10-CM

## 2019-08-13 PROCEDURE — 97110 THERAPEUTIC EXERCISES: CPT

## 2019-08-13 PROCEDURE — 97112 NEUROMUSCULAR REEDUCATION: CPT

## 2019-08-13 NOTE — PROGRESS NOTES
Daily Note     Today's date: 2019  Patient name: Alex Arambula  : 1995  MRN: [id number]  Referring provider: Izabel Barnett MD  Dx:   Encounter Diagnosis     ICD-10-CM    1  Upper back pain M54 9                   Subjective: Pt reports fatigue today secondary to "3 hours of sleep last night"  She states she was unable to comfortable secondary to baby  She states she uses a pregnancy pillow and other pillows for support  Pt denies increased pain post PT session  Objective: See treatment diary below      Assessment: Tolerated treatment well  Pt noted decreased groin pain with extension  Manual not performed secondary to no pain  Patient demonstrated fatigue post treatment, exhibited good technique with therapeutic exercises and would benefit from continued PT  Plan: Continue per plan of care        Precautions: 2nd trimester (25 weeks at IE, due 19), migraines, depression      Manual           BP Pre-tx 106/70 mmHg 107/73 mmHg 104/68 mmHg, L /86 mmHg, L UE         STM to lumbar PSM in supported lumbar flexion nv HY PK np                                                    Exercise Diary           Rolling under reviewed reviewed np np         Chin tucks 3"x5 10x5" 10x5" 10x5"         scap retraction 3"x5 10x5" 10x5" 10x5"         UBE retro nv 5' 5' 5'         TB shoulder ext nv OTB 10x5"  OTB 15x5" OTB 15x5"         TB low rows nv OTB 10x5" OTB 15x5" OTB 15x5"         Wall sags nv nv nv 5" x 5         APT/PPT nv 10x ea 10x ea 10x ea         TA iso nv 10x5" 15x5" 15x5"         TA + ball squeeze nv 10x5" 15x5"  15x5"         TA + hip TB ab nv OTB 10x5" OTB 15x5" OTB 15x5"         TA + TB multifidus push/pull nv GTB 10x ea GTB 10x ea GTB 10x ea                                                                                                                     Modalities           MH w/ extra towels Prn nv declined declined declined

## 2019-08-14 ENCOUNTER — APPOINTMENT (OUTPATIENT)
Dept: LAB | Facility: HOSPITAL | Age: 24
End: 2019-08-14
Attending: OBSTETRICS & GYNECOLOGY
Payer: COMMERCIAL

## 2019-08-14 DIAGNOSIS — Z3A.28 28 WEEKS GESTATION OF PREGNANCY: ICD-10-CM

## 2019-08-14 LAB
ANISOCYTOSIS BLD QL SMEAR: PRESENT
BASOPHILS # BLD MANUAL: 0 THOUSAND/UL (ref 0–0.1)
BASOPHILS NFR MAR MANUAL: 0 % (ref 0–1)
EOSINOPHIL # BLD MANUAL: 0 THOUSAND/UL (ref 0–0.4)
EOSINOPHIL NFR BLD MANUAL: 0 % (ref 0–6)
ERYTHROCYTE [DISTWIDTH] IN BLOOD BY AUTOMATED COUNT: 14 % (ref 11.6–15.1)
GLUCOSE 1H P 50 G GLC PO SERPL-MCNC: 170 MG/DL
HCT VFR BLD AUTO: 39 % (ref 34.8–46.1)
HGB BLD-MCNC: 13.1 G/DL (ref 11.5–15.4)
HYPERCHROMIA BLD QL SMEAR: PRESENT
LYMPHOCYTES # BLD AUTO: 11 % (ref 14–44)
LYMPHOCYTES # BLD AUTO: 2.2 THOUSAND/UL (ref 0.6–4.47)
MCH RBC QN AUTO: 31.6 PG (ref 26.8–34.3)
MCHC RBC AUTO-ENTMCNC: 33.6 G/DL (ref 31.4–37.4)
MCV RBC AUTO: 94 FL (ref 82–98)
METAMYELOCYTES NFR BLD MANUAL: 1 % (ref 0–1)
MONOCYTES # BLD AUTO: 0.8 THOUSAND/UL (ref 0–1.22)
MONOCYTES NFR BLD: 4 % (ref 4–12)
NEUTROPHILS # BLD MANUAL: 16.83 THOUSAND/UL (ref 1.85–7.62)
NEUTS BAND NFR BLD MANUAL: 4 % (ref 0–8)
NEUTS SEG NFR BLD AUTO: 80 % (ref 43–75)
NRBC BLD AUTO-RTO: 0 /100 WBCS
PLATELET # BLD AUTO: 346 THOUSANDS/UL (ref 149–390)
PLATELET BLD QL SMEAR: ADEQUATE
PMV BLD AUTO: 9.1 FL (ref 8.9–12.7)
RBC # BLD AUTO: 4.14 MILLION/UL (ref 3.81–5.12)
TOTAL CELLS COUNTED SPEC: 100
WBC # BLD AUTO: 20.04 THOUSAND/UL (ref 4.31–10.16)

## 2019-08-14 PROCEDURE — 85027 COMPLETE CBC AUTOMATED: CPT

## 2019-08-14 PROCEDURE — 82950 GLUCOSE TEST: CPT

## 2019-08-14 PROCEDURE — 86592 SYPHILIS TEST NON-TREP QUAL: CPT

## 2019-08-14 PROCEDURE — 85007 BL SMEAR W/DIFF WBC COUNT: CPT

## 2019-08-14 PROCEDURE — 36415 COLL VENOUS BLD VENIPUNCTURE: CPT

## 2019-08-15 ENCOUNTER — TELEPHONE (OUTPATIENT)
Dept: OBGYN CLINIC | Facility: CLINIC | Age: 24
End: 2019-08-15

## 2019-08-15 LAB — RPR SER QL: NORMAL

## 2019-08-15 NOTE — TELEPHONE ENCOUNTER
Patient has appointment on 8/20 - next Tuesday  Please have Dr Barrera Marquez fill out form while patient is here  I will call health department when I get back  Thanks!

## 2019-08-15 NOTE — TELEPHONE ENCOUNTER
Patient called complaining of sore throat for the last 2 days  Denies fever  Would like to take Theraflu tea for relief  This is pregnancy Category C  Recommended patient to use Tylenol and plain tea with honey and lemon instead  F/u with PCP if fever develops, or sore throat persists after a few days

## 2019-08-16 ENCOUNTER — APPOINTMENT (OUTPATIENT)
Dept: PHYSICAL THERAPY | Facility: CLINIC | Age: 24
End: 2019-08-16
Payer: COMMERCIAL

## 2019-08-19 ENCOUNTER — TELEPHONE (OUTPATIENT)
Dept: OBGYN CLINIC | Facility: CLINIC | Age: 24
End: 2019-08-19

## 2019-08-19 ENCOUNTER — TRANSCRIBE ORDERS (OUTPATIENT)
Dept: OBGYN CLINIC | Facility: CLINIC | Age: 24
End: 2019-08-19

## 2019-08-19 DIAGNOSIS — R73.09 ABNORMAL GLUCOSE TOLERANCE TEST: Primary | ICD-10-CM

## 2019-08-19 NOTE — TELEPHONE ENCOUNTER
----- Message from Ashleigh Munroe MD sent at 8/18/2019 11:52 AM EDT -----  Abnormal Sugar    170    Needs 3 hour GTT    Thanks

## 2019-08-20 ENCOUNTER — OFFICE VISIT (OUTPATIENT)
Dept: PHYSICAL THERAPY | Facility: CLINIC | Age: 24
End: 2019-08-20
Payer: COMMERCIAL

## 2019-08-20 ENCOUNTER — ROUTINE PRENATAL (OUTPATIENT)
Dept: OBGYN CLINIC | Facility: CLINIC | Age: 24
End: 2019-08-20
Payer: COMMERCIAL

## 2019-08-20 VITALS — BODY MASS INDEX: 38.38 KG/M2 | DIASTOLIC BLOOD PRESSURE: 70 MMHG | SYSTOLIC BLOOD PRESSURE: 116 MMHG | WEIGHT: 190 LBS

## 2019-08-20 DIAGNOSIS — O99.210 OBESITY AFFECTING PREGNANCY, ANTEPARTUM: ICD-10-CM

## 2019-08-20 DIAGNOSIS — Z3A.28 28 WEEKS GESTATION OF PREGNANCY: ICD-10-CM

## 2019-08-20 DIAGNOSIS — M54.9 UPPER BACK PAIN: Primary | ICD-10-CM

## 2019-08-20 PROCEDURE — 97112 NEUROMUSCULAR REEDUCATION: CPT

## 2019-08-20 PROCEDURE — 99213 OFFICE O/P EST LOW 20 MIN: CPT | Performed by: OBSTETRICS & GYNECOLOGY

## 2019-08-20 PROCEDURE — 97140 MANUAL THERAPY 1/> REGIONS: CPT

## 2019-08-20 PROCEDURE — 97110 THERAPEUTIC EXERCISES: CPT

## 2019-08-20 NOTE — LETTER
To whom it may concern Philippe Aviles is our obstetric PT with an NERY of 11/08/2019  Any question or concerns may contact the office

## 2019-08-20 NOTE — PROGRESS NOTES
Daily Note     Today's date: 2019  Patient name: Jordan Graham  : 1995  MRN: [id number]  Referring provider: Robert Coles MD  Dx:   Encounter Diagnosis     ICD-10-CM    1  Upper back pain M54 9                   Subjective: Pt reports decreased pain today in mid to low back grading the pain a 2/10  She states most pain with sitting and standing for prolonged period  Pt denies pain post PT session  Objective: See treatment diary below      Assessment: Tolerated treatment well  Pt required wedge with pillows to support back secondary to complaints of pressure in low back while lying supine  Noted mild to moderate muscle restrictions while performing manual therapy  Added heel slides to help increase core control  Patient demonstrated fatigue post treatment, exhibited good technique with therapeutic exercises and would benefit from continued PT  Plan: Continue per plan of care        Precautions: 2nd trimester (25 weeks at IE, due 19), migraines, depression      Manual          BP Pre-tx 106/70 mmHg 107/73 mmHg 104/68 mmHg, L /86 mmHg, L /78 mmHg, L UE        STM to lumbar PSM in supported lumbar flexion nv HY PK np PK                                                   Exercise Diary          Rolling under reviewed reviewed np np np        Chin tucks 3"x5 10x5" 10x5" 10x5" 10x5"        scap retraction 3"x5 10x5" 10x5" 10x5" 10x5"        UBE retro nv 5' 5' 5' 5'        TB shoulder ext nv OTB 10x5"  OTB 15x5" OTB 15x5" Grn  15x5"        TB low rows nv OTB 10x5" OTB 15x5" OTB 15x5" Grn  15x5"        Wall sags nv nv nv 5" x 5 5" x 10        APT/PPT nv 10x ea 10x ea 10x ea 10x ea        TA iso nv 10x5" 15x5" 15x5" 15x5"        TA + ball squeeze nv 10x5" 15x5"  15x5" 15x5"        TA + hip TB ab nv OTB 10x5" OTB 15x5" OTB 15x5" gtb 15x5"        TA + TB multifidus push/pull nv GTB 10x ea GTB 10x ea GTB 10x ea GTB 10x ea        TA + heel slide     10x ea                                                                                                       Modalities  7/31 8/5 8/9 8/13         MH w/ extra towels Prn nv declined declined declined

## 2019-08-21 ENCOUNTER — APPOINTMENT (OUTPATIENT)
Dept: LAB | Facility: HOSPITAL | Age: 24
End: 2019-08-21
Attending: OBSTETRICS & GYNECOLOGY
Payer: COMMERCIAL

## 2019-08-21 DIAGNOSIS — R73.09 ABNORMAL GLUCOSE TOLERANCE TEST: ICD-10-CM

## 2019-08-21 LAB
GLUCOSE 1H P 100 G GLC PO SERPL-MCNC: 180 MG/DL (ref 65–179)
GLUCOSE 2H P 100 G GLC PO SERPL-MCNC: 206 MG/DL (ref 65–154)
GLUCOSE 3H P 100 G GLC PO SERPL-MCNC: 176 MG/DL (ref 65–139)
GLUCOSE P FAST SERPL-MCNC: 91 MG/DL (ref 65–99)

## 2019-08-21 PROCEDURE — 82951 GLUCOSE TOLERANCE TEST (GTT): CPT

## 2019-08-21 PROCEDURE — 82952 GTT-ADDED SAMPLES: CPT

## 2019-08-21 PROCEDURE — 36415 COLL VENOUS BLD VENIPUNCTURE: CPT

## 2019-08-21 NOTE — PROGRESS NOTES
Assessment     Pregnancy 28 and 4/7 weeks     Patient is here today for her routine 28 week obstetrical visit     She states that she is feeling well and has no complaints at this time      Baby is moving well      She denies any bleeding or loss of fluid or any abdominal pain       She had a level 2 scan at the  center recently which was within normal limits     Blood pressure is normal today and urine screens are all negative     I encouraged her to continue to eat well and also to take adequate amounts of fluid on a daily basis     She is taking her prenatal vitamins daily     All questions were answered for her      We will see her back in 2 weeks for routine prenatal visit     She was told to call should any problems or concerns arise during the interim    Plan     28-week labs reviewed, pt failed 1 hr glucose test  Consent signed  Follow up in 2 Weeks  Óscar Bearden is a 21 y o  female being seen today for her obstetrical visit  She is at 28w5d gestation  Patient reports no complaints  Fetal movement: normal     Menstrual History:  OB History        1    Para        Term                AB        Living           SAB        TAB        Ectopic        Multiple        Live Births                    Menarche age:   Patient's last menstrual period was 2018  The following portions of the patient's history were reviewed and updated as appropriate: allergies, current medications, past family history, past medical history, past social history, past surgical history and problem list     Review of Systems  Pertinent items are noted in HPI       Objective     /70   Wt 86 2 kg (190 lb)   LMP 2018   BMI 38 38 kg/m²   FHT:  141 BPM   Uterine Size: size equals dates   Presentation: unsure

## 2019-08-21 NOTE — PATIENT INSTRUCTIONS
Patient is here today for her routine 28 week obstetrical visit     She states that she is feeling well and has no complaints at this time      Baby is moving well      She denies any bleeding or loss of fluid or any abdominal pain       She had a level 2 scan at the  center recently which was within normal limits     Blood pressure is normal today and urine screens are all negative     I encouraged her to continue to eat well and also to take adequate amounts of fluid on a daily basis     She is taking her prenatal vitamins daily     All questions were answered for her      We will see her back in 2 weeks for routine prenatal visit     She was told to call should any problems or concerns arise during the interim

## 2019-08-22 ENCOUNTER — TELEPHONE (OUTPATIENT)
Dept: OBGYN CLINIC | Facility: CLINIC | Age: 24
End: 2019-08-22

## 2019-08-22 ENCOUNTER — OFFICE VISIT (OUTPATIENT)
Dept: PHYSICAL THERAPY | Facility: CLINIC | Age: 24
End: 2019-08-22
Payer: COMMERCIAL

## 2019-08-22 DIAGNOSIS — M54.9 UPPER BACK PAIN: Primary | ICD-10-CM

## 2019-08-22 DIAGNOSIS — O24.419 GESTATIONAL DIABETES MELLITUS (GDM) IN THIRD TRIMESTER, GESTATIONAL DIABETES METHOD OF CONTROL UNSPECIFIED: Primary | ICD-10-CM

## 2019-08-22 PROCEDURE — 97140 MANUAL THERAPY 1/> REGIONS: CPT

## 2019-08-22 PROCEDURE — 97110 THERAPEUTIC EXERCISES: CPT

## 2019-08-22 NOTE — PROGRESS NOTES
Daily Note     Today's date: 2019  Patient name: Alex Arambula  : 1995  MRN: [id number]  Referring provider: Izabel Barnett MD  Dx:   Encounter Diagnosis     ICD-10-CM    1  Upper back pain M54 9                   Subjective: Pt presents to PT reporting increased pain at the end of the day with "shooting pain" down either the L or R LE but "not at the same time"  She grades the pain at its worst a 4-5/10 since last visit  Pt denies pain at this time  She reports decreased tightness post PT session and continues to deny pain  Pt arrived late; able to accommodate  Objective: See treatment diary below      Assessment: Tolerated treatment well  Educated pt on using a tennis ball at the wall to help decrease muslce restriction in mid to low back  Patient demonstrated fatigue post treatment, exhibited good technique with therapeutic exercises and would benefit from continued PT to increase core strength  Plan: Continue per plan of care        Precautions: 2nd trimester (25 weeks at IE, due 19), migraines, depression      Manual         BP Pre-tx 106/70 mmHg 107/73 mmHg 104/68 mmHg, L /86 mmHg, L /78 mmHg, L /82 mmHg, L UE       STM to lumbar PSM in supported lumbar flexion nv HY PK np PK                                                   Exercise Diary         Rolling under reviewed reviewed np np np np       Chin tucks 3"x5 10x5" 10x5" 10x5" 10x5"        scap retraction 3"x5 10x5" 10x5" 10x5" 10x5"        UBE retro nv 5' 5' 5' 5' 5'       TB shoulder ext nv OTB 10x5"  OTB 15x5" OTB 15x5" Grn  15x5" Grn  15x5"       TB low rows nv OTB 10x5" OTB 15x5" OTB 15x5" Grn  15x5" Grn  15x5"       Wall sags nv nv nv 5" x 5 5" x 10 5" x 10       APT/PPT nv 10x ea 10x ea 10x ea 10x ea        TA iso nv 10x5" 15x5" 15x5" 15x5"        TA + ball squeeze nv 10x5" 15x5"  15x5" 15x5"        TA + hip TB ab nv OTB 10x5" OTB 15x5" OTB 15x5" gtb 15x5"        TA + TB multifidus push/pull nv GTB 10x ea GTB 10x ea GTB 10x ea GTB 10x ea        TA + heel slide     10x ea nv                                                                                                      Modalities  7/31 8/5 8/9 8/13 8/22       MH w/ extra towels Prn nv declined declined declined

## 2019-08-22 NOTE — TELEPHONE ENCOUNTER
Patient aware of results and referral [address] [place] [address] diabetic pathways    Instructed to call back with any questions and if she does not hear from [address] [place] [address] by Monday

## 2019-08-26 ENCOUNTER — OFFICE VISIT (OUTPATIENT)
Dept: PHYSICAL THERAPY | Facility: CLINIC | Age: 24
End: 2019-08-26
Payer: COMMERCIAL

## 2019-08-26 DIAGNOSIS — M54.9 UPPER BACK PAIN: Primary | ICD-10-CM

## 2019-08-26 PROCEDURE — 97110 THERAPEUTIC EXERCISES: CPT

## 2019-08-26 PROCEDURE — 97530 THERAPEUTIC ACTIVITIES: CPT

## 2019-08-26 PROCEDURE — 97112 NEUROMUSCULAR REEDUCATION: CPT

## 2019-08-26 NOTE — PROGRESS NOTES
Daily Note     Today's date: 2019  Patient name: Carri Palacios  : 1995  MRN: [id number]  Referring provider: Sumaya Rg MD  Dx:   Encounter Diagnosis     ICD-10-CM    1  Upper back pain M54 9                   Subjective: Pt denies pain in upper back today but reports pain in lower back grading the pain a 3-4/10  Pt states she walked 20 mins to get here secondary to flat tire on car; no warm up today  Pt denies pain post pt session  Objective: See treatment diary below      Assessment: Tolerated treatment well reporting + response to manual therapy  Patient demonstrated fatigue post treatment, exhibited good technique with therapeutic exercises and would benefit from continued PT  Plan: Continue per plan of care        Precautions: 2nd trimester (25 weeks at IE, due 19), migraines, depression      Manual        BP Pre-tx 106/70 mmHg 107/73 mmHg 104/68 mmHg, L /86 mmHg, L /78 mmHg, L /82 mmHg, L /78 mmHg, L UE      STM to lumbar PSM in supported lumbar flexion nv HY PK np PK                                                   Exercise Diary        Rolling under reviewed reviewed np np np np np      Chin tucks 3"x5 10x5" 10x5" 10x5" 10x5"  10 x 5"      scap retraction 3"x5 10x5" 10x5" 10x5" 10x5"  15 x 5"      UBE retro nv 5' 5' 5' 5' 5' nv      TB shoulder ext nv OTB 10x5"  OTB 15x5" OTB 15x5" Grn  15x5" Grn  15x5" Grn  15x5"      TB low rows nv OTB 10x5" OTB 15x5" OTB 15x5" Grn  15x5" Grn  15x5" Grn  15x5"      Wall sags nv nv nv 5" x 5 5" x 10 5" x 10 5" x 10      APT/PPT nv 10x ea 10x ea 10x ea 10x ea 10x ea 10x ea pball     TA iso nv 10x5" 15x5" 15x5" 15x5"  15x5" pball     TA + ball squeeze nv 10x5" 15x5"  15x5" 15x5"  15x5" pball     TA + hip TB ab nv OTB 10x5" OTB 15x5" OTB 15x5" gtb 15x5"  gtb 15x5" pball     TA + TB multifidus push/pull nv GTB 10x ea GTB 10x ea GTB 10x ea GTB 10x ea  nv TA + heel slide     10x ea nv nv      Mini squat @ wall       5" x 10      QL stretch seated       nv  pball     UE wall slides: with white bolster       x10                                                              Modalities  7/31 8/5 8/9 8/13 8/22 8/26      MH w/ extra towels Prn nv declined declined declined   np

## 2019-08-27 ENCOUNTER — OFFICE VISIT (OUTPATIENT)
Dept: PERINATAL CARE | Facility: CLINIC | Age: 24
End: 2019-08-27
Payer: COMMERCIAL

## 2019-08-27 DIAGNOSIS — O24.419 GESTATIONAL DIABETES MELLITUS (GDM) IN THIRD TRIMESTER, GESTATIONAL DIABETES METHOD OF CONTROL UNSPECIFIED: ICD-10-CM

## 2019-08-27 DIAGNOSIS — Z3A.29 PREGNANCY WITH 29 COMPLETED WEEKS GESTATION: Primary | ICD-10-CM

## 2019-08-27 DIAGNOSIS — O24.410 DIET CONTROLLED GESTATIONAL DIABETES MELLITUS (GDM) IN THIRD TRIMESTER: ICD-10-CM

## 2019-08-27 PROCEDURE — G0108 DIAB MANAGE TRN  PER INDIV: HCPCS

## 2019-08-27 RX ORDER — LANCETS
EACH MISCELLANEOUS
Qty: 102 EACH | Refills: 4 | Status: SHIPPED | OUTPATIENT
Start: 2019-08-27

## 2019-08-27 RX ORDER — BLOOD SUGAR DIAGNOSTIC
STRIP MISCELLANEOUS
Qty: 100 EACH | Refills: 4 | Status: SHIPPED | OUTPATIENT
Start: 2019-08-27 | End: 2019-11-10 | Stop reason: HOSPADM

## 2019-08-27 NOTE — PROGRESS NOTES
19  Nighat Gallo   1995  NERY: 19   EGA: [id number]  OBGYN: Murlean Epley    Thank you for referring your patient to the Diabetes [facility] Pregnancy Program at [address] [place] [address]  The patient attended class 1 and patient received the following education:     Pathophysiology of diabetes and pregnancy  This includes maternal-fetal complications such as fetal macrosomia,  hypoglycemia, polyhydramnios, increased incidence of  section, pre-term labor and in severe cases, fetal demise and stillbirth   Instruction on diet and glucometer use was provided  Self-monitoring of blood glucose levels: fasting (goal 60mg/dl to 90mg/dl) and two hours after the start of the meal less (goal less than 120mg/dl)  The patient was provided with a Accu-chek Guide blood glucose meter and supplies  Blood glucose during demonstration was 117 mg/dl   Medical Nutrition Therapy for diabetes and pregnancy  The patient was provided with a 1800 calorie meal plan and the following was reviewed:     o Basic review of macronutrients   o Meal pattern should consist of three small meals and three snacks daily  o Carbohydrate gram amounts per meal   o Instructions on how to read a food label  o Appropriate serving sizes for carbohydrates and proteins  o Incorporating protein at each meal and snack  o Maintain a three day food diary and bring to class 2    Report blood glucose levels to the [address] [place] [address] Way weekly or as directed:  o Phone : [phone]  If no response in 24 hours, call [phone]   o Fax: [phone]  o Email: [email or web address]  The patient is scheduled to attend class 2 on Thursday, 19  Additionally, fetal ultrasound evaluation by the Perinatologist has been scheduled to assure continuity of care  Please contact the Diabetes and Pregnancy Program at [phone] if you have any questions    Time spent with patient [id number]; time spent face to face counseling greater than 50% of the appointment      Sincerely,   Dwayne Wing RN BS CDE  Diabetes Educator   [facility]

## 2019-08-28 ENCOUNTER — ULTRASOUND (OUTPATIENT)
Dept: PERINATAL CARE | Facility: OTHER | Age: 24
End: 2019-08-28
Payer: COMMERCIAL

## 2019-08-28 ENCOUNTER — OFFICE VISIT (OUTPATIENT)
Dept: PHYSICAL THERAPY | Facility: CLINIC | Age: 24
End: 2019-08-28
Payer: COMMERCIAL

## 2019-08-28 VITALS
BODY MASS INDEX: 38.87 KG/M2 | SYSTOLIC BLOOD PRESSURE: 101 MMHG | WEIGHT: 192.8 LBS | HEIGHT: 59 IN | HEART RATE: 101 BPM | DIASTOLIC BLOOD PRESSURE: 70 MMHG

## 2019-08-28 DIAGNOSIS — M54.9 UPPER BACK PAIN: Primary | ICD-10-CM

## 2019-08-28 DIAGNOSIS — Z3A.29 29 WEEKS GESTATION OF PREGNANCY: ICD-10-CM

## 2019-08-28 DIAGNOSIS — O24.410 DIET CONTROLLED GESTATIONAL DIABETES MELLITUS (GDM) IN THIRD TRIMESTER: Primary | ICD-10-CM

## 2019-08-28 DIAGNOSIS — R19.7 DIARRHEA IN ADULT PATIENT: ICD-10-CM

## 2019-08-28 PROCEDURE — 97530 THERAPEUTIC ACTIVITIES: CPT

## 2019-08-28 PROCEDURE — 99212 OFFICE O/P EST SF 10 MIN: CPT | Performed by: OBSTETRICS & GYNECOLOGY

## 2019-08-28 PROCEDURE — 97110 THERAPEUTIC EXERCISES: CPT

## 2019-08-28 PROCEDURE — 76816 OB US FOLLOW-UP PER FETUS: CPT | Performed by: OBSTETRICS & GYNECOLOGY

## 2019-08-28 PROCEDURE — 97112 NEUROMUSCULAR REEDUCATION: CPT

## 2019-08-28 NOTE — PROGRESS NOTES
Daily Note     Today's date: 2019  Patient name: Binta Aguayo  : 1995  MRN: [id number]  Referring provider: Vera Espino MD  Dx:   Encounter Diagnosis     ICD-10-CM    1  Upper back pain M54 9                   Subjective:  Pt presents to PT with "discomfort" today but states most pain with prolonged standing and walking  She grades the pain at its worst a 4-5/10  Pt denies pain post PT session  Objective: See treatment diary below      Assessment: Tolerated treatment well  Added pball for some TE today to assist with posture and work on core strength  Pt reports mild discomfort sitting on ball today however she states it may be from increased activity from baby today  Patient demonstrated fatigue post treatment, exhibited good technique with therapeutic exercises and would benefit from continued PT to improve posture and increase core strength  Plan: Continue per plan of care        Precautions: 2nd trimester (25 weeks at IE, due 19), migraines, depression      Manual       BP Pre-tx 106/70 mmHg 107/73 mmHg 104/68 mmHg, L /86 mmHg, L /78 mmHg, L /82 mmHg, L /78 mmHg, L /62 mmHg, L UE     STM to lumbar PSM in supported lumbar flexion nv HY PK np PK                                                   Exercise Diary       Rolling under reviewed reviewed np np np np np np     Chin tucks 3"x5 10x5" 10x5" 10x5" 10x5"  10 x 5"  pball  5" x 10     scap retraction 3"x5 10x5" 10x5" 10x5" 10x5"  15 x 5"  pball  5" x 15     UBE retro nv 5' 5' 5' 5' 5' nv 5'     TB shoulder ext nv OTB 10x5"  OTB 15x5" OTB 15x5" Grn  15x5" Grn  15x5" Grn  15x5" Grn  15x5"     TB low rows nv OTB 10x5" OTB 15x5" OTB 15x5" Grn  15x5" Grn  15x5" Grn  15x5" Grn  15x5"     Wall sags nv nv nv 5" x 5 5" x 10 5" x 10 5" x 10      APT/PPT nv 10x ea 10x ea 10x ea 10x ea 10x ea 10x ea pball  10x ea     TA iso nv 10x5" 15x5" 15x5" 15x5"  15x5" pball  5" x 15     TA + ball squeeze nv 10x5" 15x5"  15x5" 15x5"  15x5" pball  5" x 15     TA + hip TB ab nv OTB 10x5" OTB 15x5" OTB 15x5" gtb 15x5"  gtb 15x5" pball  np     TA + TB multifidus push/pull nv GTB 10x ea GTB 10x ea GTB 10x ea GTB 10x ea  nv np     TA + heel slide     10x ea nv nv nv     Mini squat @ wall       5" x 10 nv     QL stretch seated       nv  pball     UE wall slides: with white bolster       x10 nv                                                             Modalities  7/31 8/5 8/9 8/13 8/22 8/26 8/28     MH w/ extra towels Prn nv declined declined declined   np

## 2019-08-28 NOTE — PROGRESS NOTES
The patient was seen today for an ultrasound  Please see ultrasound report (located under Ob Procedures) for additional details  Thank you very much for allowing us to participate in the care of this very nice patient  Should you have any questions, please do not hesitate to contact me  Edward Merino MD [place]  Attending Physician, [place]

## 2019-09-04 ENCOUNTER — OFFICE VISIT (OUTPATIENT)
Dept: PHYSICAL THERAPY | Facility: CLINIC | Age: 24
End: 2019-09-04
Payer: COMMERCIAL

## 2019-09-04 DIAGNOSIS — M54.9 UPPER BACK PAIN: Primary | ICD-10-CM

## 2019-09-04 PROCEDURE — 97140 MANUAL THERAPY 1/> REGIONS: CPT

## 2019-09-04 PROCEDURE — 97110 THERAPEUTIC EXERCISES: CPT

## 2019-09-04 PROCEDURE — 97530 THERAPEUTIC ACTIVITIES: CPT

## 2019-09-04 NOTE — PROGRESS NOTES
Daily Note     Today's date: 2019  Patient name: Murphy Son  : 1995  MRN: [id number]  Referring provider: Sari Martinez MD  Dx:   Encounter Diagnosis     ICD-10-CM    1  Upper back pain M54 9                   Subjective: Pt presents to PT reporting mild pain in upper to low back  Pt notes increased mobility post PT session reporting + response to manual therapy  Objective: See treatment diary below      Assessment: Tolerated treatment well  Will update HEP NV  Patient demonstrated fatigue post treatment, exhibited good technique with therapeutic exercises and would benefit from continued PT  Plan: Continue per plan of care        Precautions: 2nd trimester (25 weeks at IE, due 19), migraines, depression      Manual      BP Pre-tx 106/70 mmHg 107/73 mmHg 104/68 mmHg, L /86 mmHg, L /78 mmHg, L /82 mmHg, L /78 mmHg, L /62 mmHg, L /76 mmHg, L UE    STM to lumbar PSM in supported lumbar flexion nv HY PK np PK                                                   Exercise Diary      Rolling under reviewed reviewed np np np np np np np    Chin tucks 3"x5 10x5" 10x5" 10x5" 10x5"  10 x 5"  pball  5" x 10 pball  5" x 10    scap retraction 3"x5 10x5" 10x5" 10x5" 10x5"  15 x 5"  pball  5" x 15 pball  5" x 15    UBE retro nv 5' 5' 5' 5' 5' nv 5' 5'    TB shoulder ext nv OTB 10x5"  OTB 15x5" OTB 15x5" Grn  15x5" Grn  15x5" Grn  15x5" Grn  15x5" Grn  15x5"    TB low rows nv OTB 10x5" OTB 15x5" OTB 15x5" Grn  15x5" Grn  15x5" Grn  15x5" Grn  15x5" Grn  15x5"    Wall sags nv nv nv 5" x 5 5" x 10 5" x 10 5" x 10 5" x 10 5" x 10    APT/PPT nv 10x ea 10x ea 10x ea 10x ea 10x ea 10x ea pball  10x ea pball  10x ea    TA iso nv 10x5" 15x5" 15x5" 15x5"  15x5" pball  5" x 15 nv    TA + ball squeeze nv 10x5" 15x5"  15x5" 15x5"  15x5" pball  5" x 15 pball  5" x 15    TA + hip TB ab nv OTB 10x5" OTB 15x5" OTB 15x5" gtb 15x5"  gtb 15x5" pball  np pball  5" x 15  gtg    TA + TB multifidus push/pull nv GTB 10x ea GTB 10x ea GTB 10x ea GTB 10x ea  nv np np    TA + heel slide     10x ea nv nv nv nv    Mini squat @ wall       5" x 10 nv 5" x 10    QL stretch seated       nv  pball 3" x 10 ea    UE wall slides: with white bolster       x10 nv x10                                                            Modalities  7/31 8/5 8/9 8/13 8/22 8/26 8/28     MH w/ extra towels Prn nv declined declined declined   np

## 2019-09-06 ENCOUNTER — OFFICE VISIT (OUTPATIENT)
Dept: PHYSICAL THERAPY | Facility: CLINIC | Age: 24
End: 2019-09-06
Payer: COMMERCIAL

## 2019-09-06 ENCOUNTER — DOCUMENTATION (OUTPATIENT)
Dept: PERINATAL CARE | Facility: CLINIC | Age: 24
End: 2019-09-06

## 2019-09-06 DIAGNOSIS — M54.9 UPPER BACK PAIN: Primary | ICD-10-CM

## 2019-09-06 PROCEDURE — 97110 THERAPEUTIC EXERCISES: CPT

## 2019-09-06 PROCEDURE — 97140 MANUAL THERAPY 1/> REGIONS: CPT

## 2019-09-06 NOTE — PROGRESS NOTES
Daily Note     Today's date: 2019  Patient name: Sadiq Curiel  : 1995  MRN: [id number]  Referring provider: Martine Kim MD  Dx:   Encounter Diagnosis     ICD-10-CM    1  Upper back pain M54 9                   Subjective: Pt presents to PT reporting some personally stress today increasing discomfort  Pt denies pain in mid to low back stating she "hasn't done much yet today"  She does reports improvement with flexibility but continues to be tight along lower abdominal muscles  Pt continues to deny pain post PT session  Objective: See treatment diary below      Assessment: Tolerated treatment well  Pt deferred remaining TE secondary to needing to take care of personal issues  Patient demonstrated fatigue post treatment, exhibited good technique with therapeutic exercises and would benefit from continued PT  Plan: Continue per plan of care        Precautions: 2nd trimester (25 weeks at IE, due 19), migraines, depression      Manual     BP Pre-tx 106/70 mmHg 107/73 mmHg 104/68 mmHg, L /86 mmHg, L /78 mmHg, L /82 mmHg, L /78 mmHg, L /62 mmHg, L /76 mmHg, L /70 mmHg, L UE   STM to lumbar PSM in supported lumbar flexion nv HY PK np PK                                                   Exercise Diary     Rolling under reviewed reviewed np np np np np np np np   Chin tucks 3"x5 10x5" 10x5" 10x5" 10x5"  10 x 5"  pball  5" x 10 pball  5" x 10 pball  5" x 10   scap retraction 3"x5 10x5" 10x5" 10x5" 10x5"  15 x 5"  pball  5" x 15 pball  5" x 15 pball  5" x 15   UBE retro nv 5' 5' 5' 5' 5' nv 5' 5' Pt declined   TB shoulder ext nv OTB 10x5"  OTB 15x5" OTB 15x5" Grn  15x5" Grn  15x5" Grn  15x5" Grn  15x5" Grn  15x5" nv   TB low rows nv OTB 10x5" OTB 15x5" OTB 15x5" Grn  15x5" Grn  15x5" Grn  15x5" Grn  15x5" Grn  15x5" nv   Wall sags nv nv nv 5" x 5 5" x 10 5" x 10 5" x 10 5" x 10 5" x 10 nv   APT/PPT nv 10x ea 10x ea 10x ea 10x ea 10x ea 10x ea pball  10x ea pball  10x ea pball  10x ea   TA iso nv 10x5" 15x5" 15x5" 15x5"  15x5" pball  5" x 15 nv nv   TA + ball squeeze nv 10x5" 15x5"  15x5" 15x5"  15x5" pball  5" x 15 pball  5" x 15 pball  5" x 15   TA + hip TB ab nv OTB 10x5" OTB 15x5" OTB 15x5" gtb 15x5"  gtb 15x5" pball  np pball  5" x 15  gtg pball  5" x 15  gtg   TA + TB multifidus push/pull nv GTB 10x ea GTB 10x ea GTB 10x ea GTB 10x ea  nv np np nv   TA + heel slide     10x ea nv nv nv nv nv   Mini squat @ wall       5" x 10 nv 5" x 10 nv   QL stretch seated       nv  pball 3" x 10 ea nv   UE wall slides: with white bolster       x10 nv x10 nv                                                           Modalities  7/31 8/5 8/9 8/13 8/22 8/26 8/28 9/4 9/6   MH w/ extra towels Prn nv declined declined declined   np

## 2019-09-06 NOTE — PROGRESS NOTES
Date:  19  RE: Meka Diallo    : 1995  NERY: Estimated Date of Delivery: 19  EGA: 31w0d    Diet controlled GDM third trimester      Current regimen:  1800 calorie GDM meal plan including 3 meals and 3 snacks daily with protein at each  SMBG 4 times per day, fasting and 2 hours after the start of each meal via Accu-chek Guide meter  Plan:  Continue current regimen      () US: Fetal Growth and ZAKI normal    Date due to report next:  Monday,  at Class 2 appointment    Jakob Wallace RD, LDN  Diabetes Educator   [facility]

## 2019-09-09 ENCOUNTER — OFFICE VISIT (OUTPATIENT)
Dept: PERINATAL CARE | Facility: OTHER | Age: 24
End: 2019-09-09
Payer: COMMERCIAL

## 2019-09-09 DIAGNOSIS — Z3A.31 31 WEEKS GESTATION OF PREGNANCY: ICD-10-CM

## 2019-09-09 DIAGNOSIS — O24.419 GESTATIONAL DIABETES MELLITUS (GDM) IN THIRD TRIMESTER, GESTATIONAL DIABETES METHOD OF CONTROL UNSPECIFIED: Primary | ICD-10-CM

## 2019-09-09 PROCEDURE — G0108 DIAB MANAGE TRN  PER INDIV: HCPCS | Performed by: DIETITIAN, REGISTERED

## 2019-09-09 NOTE — PROGRESS NOTES
Follow Up (Class 2): GDM    Thank you for referring your patient to [facility] Maternal Fetal Medicine Diabetes Education Program  Juan Fisher is a  21 y o  female who presents today for GDM follow up visit  Patient is at 31w3d gestation, Estimated Date of Delivery: 11/8/19  Reviewed and updated the following from patients medical record: PMH, Problem List, Allergies, and Current Medications  Diagnosis:  Medical Diagnosis/ICD 10: Diet controlled GDM third trimester    PMH/PSH:  Past Medical History:   Diagnosis Date    Broken arm     Left arm     Depression     Diet controlled gestational diabetes mellitus (GDM) in third trimester 8/28/2019    Migraine     Polycystic ovary syndrome     Varicella      Past Surgical History:   Procedure Laterality Date    SKIN GRAFT      Full-thickness burn on back- reportedly complication of liposuction procedure in [place] summer 2013        Labs:  No results found for: HGBA1C  No results found for: EAG  Lab Results   Component Value Date    [id number] 170 (H) 08/14/2019 8/21:  Fasting- 91 mg/dL  1 hr GTT- 180 mg/dL  2 hr GTT- 206 mg/dL  3 hr GTT- 176 mg/dL    Medications:  Current Outpatient Medications on File Prior to Visit   Medication Sig Dispense Refill    ACCU-CHEK FASTCLIX LANCETS MISC Test fasting and 2 hours after start of meals, 4 times daily 102 each 4    ACCU-CHEK GUIDE test strip Test fasting and 2 hours after start of meals 100 each 4    loratadine (CLARITIN) 10 mg tablet Take 1 tablet (10 mg total) by mouth daily 30 tablet 1    Prenatal Vit-Fe Fumarate-FA (PRENATAL VITAMIN PLUS LOW IRON) 27-1 MG TABS Take 1 tablet by mouth daily  3     No current facility-administered medications on file prior to visit            Anthropometrics:  Ht Readings from Last 3 Encounters:   08/28/19 4' 11" (1 499 m)   08/06/19 4' 11" (1 499 m)   07/12/19 5' (1 524 m)     Wt Readings from Last 3 Encounters:   08/28/19 87 5 kg (192 lb 12 8 oz)   08/20/19 86 2 kg (190 lb) 19 82 6 kg (182 lb)     Pre-Pregnant Weight: 81 6 kg (180 lb)  Pre-Pregnant BMI: 36 3  Weight Change: 12 lb weight gain    Recent Ultrasound for Growth Shows:   DATE:  Fetal Growth: Normal  ZAKI: Normal    BG Log:        Both fasting and PP glucose WDL  No indication for medication at this time  Blood Glucose Monitoring:  Pt instructed on testing blood sugars: 4 x per day (Fasting, 2 hour after start of each meal)    Instruction for reporting blood sugar results weekly or sooner or more frequent as indicated Via:   Phone: [phone]   Fax: [phone]   E-mail: [email or web address]   My Chart    Date To Report Next: Monday,     Blood Sugar Goal  Ranges:    Fastin-90 mg/dL   1 hour after the start of each meal: 135 mg/dL or <   2 hours after start of each meal: 120 mg/dL or <    *Reviewed patient's 3-day food diary and BS log sheets, and provided suggestions for maintaining an individualized meal plan  Diet Recall (24-hr Intake): Wakes: 8:00-9:00 (rarely later)- testing fasting  Breakfast (9:30-10:00 or goes back to sleep): honey nut cheerios demetria puff or fruit loops (1 cup or 1 5 cups) cereal and milk (cow milk) sometimes hard boiled eggs (2) on side  AM Snack(11:30-12:00): fruit and crackers or chips (2 oz)  Lunch (12:00-2:00): Rice, beans, and meat usually chicken 1 drumstick and 1 thigh (eats half now)   PM Snack(4:00-5:00): eats other half of rice, beans and meat (1 and 1 half cups rice and beans combined)  Dinner (8:00): cup of  Soup (dayron noodles) Or bowl of cereal with milk  Bedtime Snack (10:00 am): crackers, cookies, leftover bag of chips, yogurt (great value peach from [employer])  Bedtime: 10:00 pm but still awake at  2-3 in the morning sometimes  Sometimes takes afternoon naps  Note: Pt tries to keep 2 hours in between meals but occasionally gets hungry sooner  Recommended having extra protein of high fiber non starchy vegetables to help keep pt feeling quintero longer  Also recommended if feeling hungry in the middle of the night when awake to have protein only snack, suggestions provided  Per pts diet recall, she is lacking in protein overall at both meals and snacks  Suggestions provided for how to increase protein throughout the day, and pt agreeable to try some of the options suggested  Pt is also having sugar sweetened beverages on occasion such as regular soda, and is also having sugar coated cereals, and dessert items such as cookies  Provided suggestions for beverages and dessert options that are more appropriate  She also is going to fast food restaurants such as [employer] from time to time, if she did not plan her meals prior  Example:   Goes to [employer] or [employer]- has whopper with cheese burger with small fries and small sprite (eats fries and soda together at one meal, and then burger later for second meal or snack)  Beverages: crystal light, aga sugar free added to water    Meal Plan (recommended daily calorie and protein needs):  1800 calories (CHO: [id number]) (PRO:2/3-1-3-1-3-2)  Protein: 97 gm/day    Diet Review:  1  Reviewed GDM meal plan including 3 meals and 3 snacks  2  Discussed appropriate amounts of CHO, PRO, and Fat at each meal and snack  3  Reviewed CHO exchange list, and portion sizes for both CHO and PRO via food models  4  Instruction on how to read a food label  5  Provided suggested meal/snack options to increase nutrition and maintain consistent /snack intakes  6  Encouraged  patient to eat every 2 5-3 hours while awake  7  Encouraged patient to go no longer than 10 hours fasting overnight until first meal of the day  Physical Activity Assessment:  Exercise (type/frequency): N/A    Discussed benefits of physical activity to optimize blood glucose control, encouraged activity at patient is physically able  Always consult a physician prior to starting an exercise program  Walking up to 30 min   daily can help improve blood glucose levels    Sick day Guidelines:   Patient advised that sickness will raise blood sugar and need to continue medication regimen as directed  If blood sugar is > 160 mg/dL twice in one day call doctor  Instructed on what to do when unable to consume normal meal plan  Hypoglycemia & Treatment Guidelines:  Reviewed what hypoglycemia is, signs and symptoms, and how to treat  Post-Partum Guidelines:  Completion of 75 gm CHO 2 hr gtt at 6 weeks post-partum to check for Type 2 DM diagnosis    Breastfeeding Guidelines:  Continue GDM meal plan plus additional 350-500 calories daily  Stay hydrated by drinking 8-10 (8 oz ) fluids daily  Examples of protein and carbohydrate snacks provided  Dining Out & Travel Guidelines:  Patient advised to be prepared with extra diabetes supplies, medications, and snacks, as well as sticking to the same time schedule and portions eaten at home for meals and snacks  Maternal-Fetal Testing:    Ultrasounds- growth scans every 4 weeks  NST- twice weekly starting at 32nd week GA   ZAKI- weekly starting at 32 weeks GA    Nutrition Diagnosis Statement:  Nutrition Diagnosis: Altered nutrition related lab values (glucose)   Related to: GDM  As evidenced by: 1 hr  mg/dL, 2 hr  mg/dL, 3 hr  mg/dL    Goals:  1  Fasting BG 60-90 mg/dL  2  2 hr PP Blood Glucose <120 mg/dL (1 hr PP <135)  3  GDM Diet     Monitoring and Evaluation  1  Adherence to GDM diet  2  Blood Glucose Monitoring   3   Weight/ Body Composition    Patient Response to Instruction:  Learner/s Present:Learners Present: Patient   Diabetes education in the past: no  Special Learning Needs: no    Educational Materials Provided: Education Materials MFM: after visit summary with recommendations  Teaching methods used: Teaching Methods MFM: Listened to Instruction, Visualized Demonstration, Patient taught- back information , Label Reading and Food Models  Patients Response to Education Information Provided: Voices Understanding and Provided practical ways to integrate information learned into daily routine  Comprehension: good  Motivation: good  Readiness to Change: Action (Actively implementing change)  Barriers to Learning/Change: no barriers    Expected Compliance: good    Begin Time: 11:10 am  End Time: 12:15 am  Referring Provider: Charlotte Blair MD    It was a pleasure working with them today  Please feel free to call with any questions or concerns      Ace Duncan RD, LDN  [facility]  Diabetes in Pregnancy Program  [address] [place] [address],[address]  [address]

## 2019-09-09 NOTE — PATIENT INSTRUCTIONS
1  Continue Meal Plan consisting of 3 meals and 3 snacks daily, including protein at each  2  Try to eat every 2-3 5 hours while awake  3  Do not exceed 8-10 hours fasting overnight  4  Continue self-monitoring blood glucose 4 times per day: fasting and 2 hours after the start of each meal (record in log book)  5  Submit blood sugar values weekly via email: blood  [email or web address] or My Chart  6  If no restriction from physician, recommend up to 30 minutes walking daily  7  Try to add source of protein with every meal and snack  Examples: hardboiled eggs, string cheese, nuts nut butters, cottage cheese, greek style yogurt, tuna salad, chicken salad or egg salad, meat and cheese roll ups, hummus, protein bars ([employer] protein), protein shakes  8  Try diet soda, juice, farilife milk, almond milk, sugar free crystal light or aga sugar free to flavor water  9  Try kid meal when going to fast food to reduce portion sizes    10  Please report Monday 9/16

## 2019-09-10 ENCOUNTER — ROUTINE PRENATAL (OUTPATIENT)
Dept: OBGYN CLINIC | Facility: CLINIC | Age: 24
End: 2019-09-10
Payer: COMMERCIAL

## 2019-09-10 ENCOUNTER — OFFICE VISIT (OUTPATIENT)
Dept: PHYSICAL THERAPY | Facility: CLINIC | Age: 24
End: 2019-09-10
Payer: COMMERCIAL

## 2019-09-10 VITALS — SYSTOLIC BLOOD PRESSURE: 110 MMHG | DIASTOLIC BLOOD PRESSURE: 76 MMHG | BODY MASS INDEX: 39.14 KG/M2 | WEIGHT: 193.8 LBS

## 2019-09-10 DIAGNOSIS — O24.419 GESTATIONAL DIABETES MELLITUS (GDM) IN THIRD TRIMESTER, GESTATIONAL DIABETES METHOD OF CONTROL UNSPECIFIED: ICD-10-CM

## 2019-09-10 DIAGNOSIS — R19.7 DIARRHEA IN ADULT PATIENT: ICD-10-CM

## 2019-09-10 DIAGNOSIS — M54.9 UPPER BACK PAIN: Primary | ICD-10-CM

## 2019-09-10 DIAGNOSIS — Z3A.31 31 WEEKS GESTATION OF PREGNANCY: Primary | ICD-10-CM

## 2019-09-10 PROCEDURE — 97140 MANUAL THERAPY 1/> REGIONS: CPT | Performed by: PHYSICAL MEDICINE & REHABILITATION

## 2019-09-10 PROCEDURE — 97110 THERAPEUTIC EXERCISES: CPT | Performed by: PHYSICAL MEDICINE & REHABILITATION

## 2019-09-10 PROCEDURE — 99213 OFFICE O/P EST LOW 20 MIN: CPT | Performed by: OBSTETRICS & GYNECOLOGY

## 2019-09-10 NOTE — PROGRESS NOTES
Daily Note     Today's date: 9/10/2019  Patient name: Ervin Hwang  : 1995  MRN: [id number]  Referring provider: Kassandra Noble MD  Dx:   Encounter Diagnosis     ICD-10-CM    1  Upper back pain M54 9        Start Time: 161  Stop Time: 1653  Total time in clinic (min): 38 minutes    Subjective: Pt states that overall she is feeling good, chief complaint of low back discomfort  Objective: See treatment diary below      Assessment: Tolerated treatment well  Patient demonstrated fatigue post treatment, exhibited good technique with therapeutic exercises and would benefit from continued PT      Plan: Continue per plan of care  Reevaluation next visit       Precautions: 2nd trimester (25 weeks at IE, due 19), migraines, depression      Manual  9/10 8/5 8/9 8/13 8/20 8/22 8/26 8/28 9/4 9/6   BP Pre-tx  107/73 mmHg 104/68 mmHg, L /86 mmHg, L /78 mmHg, L /82 mmHg, L /78 mmHg, L /62 mmHg, L /76 mmHg, L /70 mmHg, L UE   STM to lumbar PSM in supported lumbar flexion  HY PK np PK                                                   Exercise Diary  9/10 8/5 8/9 8/13 8/20 8/22 8/26 8/28 9/4 9/6   Rolling under  reviewed np np np np np np np np   Chin tucks 5" x 10 pball 10x5" 10x5" 10x5" 10x5"  10 x 5"  pball  5" x 10 pball  5" x 10 pball  5" x 10   scap retraction 15 c 5" 10x5" 10x5" 10x5" 10x5"  15 x 5"  pball  5" x 15 pball  5" x 15 pball  5" x 15   UBE retro 5' 5' 5' 5' 5' 5' nv 5' 5' Pt declined   TB shoulder ext  OTB 10x5"  OTB 15x5" OTB 15x5" Grn  15x5" Grn  15x5" Grn  15x5" Grn  15x5" Grn  15x5" nv   TB low rows  OTB 10x5" OTB 15x5" OTB 15x5" Grn  15x5" Grn  15x5" Grn  15x5" Grn  15x5" Grn  15x5" nv   Wall sags 5" x 10 nv nv 5" x 5 5" x 10 5" x 10 5" x 10 5" x 10 5" x 10 nv   APT/PPT pball  2 x 10 10x ea 10x ea 10x ea 10x ea 10x ea 10x ea pball  10x ea pball  10x ea pball  10x ea   TA iso  10x5" 15x5" 15x5" 15x5"  15x5" pball  5" x 15 nv nv   TA + ball squeeze pball  15 c 5" 10x5" 15x5"  15x5" 15x5"  15x5" pball  5" x 15 pball  5" x 15 pball  5" x 15   TA + hip TB ab pball  GTB  5" x 15 OTB 10x5" OTB 15x5" OTB 15x5" gtb 15x5"  gtb 15x5" pball  np pball  5" x 15  gtg pball  5" x 15  gtg   TA + TB multifidus push/pull GTB  15 ea GTB 10x ea GTB 10x ea GTB 10x ea GTB 10x ea  nv np np nv   TA + heel slide     10x ea nv nv nv nv nv   Mini squat @ wall       5" x 10 nv 5" x 10 nv   QL stretch seated       nv  pball 3" x 10 ea nv   UE wall slides: with white bolster 10      x10 nv x10 nv                                                           Modalities  7/31 8/5 8/9 8/13 8/22 8/26 8/28 9/4 9/6   MH w/ extra towels Prn nv declined declined declined   np

## 2019-09-10 NOTE — PROGRESS NOTES
Assessment     Pregnancy 31 and 4/7 weeks     Patient is here today for her routine 31 week obstetrical visit     She states that she is feeling well and has no complaints at this time  Baby is moving well  She denies any bleeding or loss of fluid or any abdominal pain  She had a level 2 scan at the  center recently which was within normal limits     Blood pressure is normal today and urine screens are all negative     I encouraged her to continue to eat well and also to take adequate amounts of fluid on a daily basis     She is taking her prenatal vitamins daily    Patient now being followed at the  Center for A1 gestational diabetes     All questions were answered for her      We will see her back in 2 weeks for routine prenatal visit     She was told to call should any problems or concerns arise during the interim    Plan     28-week labs reviewed, normal  Consent signed  Follow up in 2 Weeks  Romel Woodard is a 21 y o  female being seen today for her obstetrical visit  She is at 31w4d gestation  Patient reports no complaints  Fetal movement: normal     Menstrual History:  OB History        1    Para        Term                AB        Living           SAB        TAB        Ectopic        Multiple        Live Births                    Menarche age:   Patient's last menstrual period was 2018  The following portions of the patient's history were reviewed and updated as appropriate: allergies, current medications, past family history, past medical history, past social history, past surgical history and problem list     Review of Systems  Pertinent items are noted in HPI       Objective     /76   Wt 87 9 kg (193 lb 12 8 oz)   LMP 2018   BMI 39 14 kg/m²   FHT:  147 BPM   Uterine Size: size equals dates   Presentation: cephalic

## 2019-09-10 NOTE — PATIENT INSTRUCTIONS
Patient is here today for her routine 31 week obstetrical visit     She states that she is feeling well and has no complaints at this time  Baby is moving well  She denies any bleeding or loss of fluid or any abdominal pain        She had a level 2 scan at the  center recently which was within normal limits     Blood pressure is normal today and urine screens are all negative     I encouraged her to continue to eat well and also to take adequate amounts of fluid on a daily basis     She is taking her prenatal vitamins daily     All questions were answered for her      We will see her back in 2 weeks for routine prenatal visit     She was told to call should any problems or concerns arise during the interim

## 2019-09-12 ENCOUNTER — EVALUATION (OUTPATIENT)
Dept: PHYSICAL THERAPY | Facility: CLINIC | Age: 24
End: 2019-09-12
Payer: COMMERCIAL

## 2019-09-12 DIAGNOSIS — M54.9 UPPER BACK PAIN: Primary | ICD-10-CM

## 2019-09-12 PROCEDURE — 97140 MANUAL THERAPY 1/> REGIONS: CPT | Performed by: PHYSICAL THERAPIST

## 2019-09-12 NOTE — PROGRESS NOTES
PT Re-Evaluation     Today's date: 2019  Patient name: Carri Palacios  : 1995  MRN: [id number]  Referring provider: Sumaya Rg MD  Dx:   Encounter Diagnosis     ICD-10-CM    1  Upper back pain M54 9                   Assessment  Assessment details: Carri Palacios is a 21 y o  female who presents to physical therapy with Upper back pain  Pt is currently 25 weeks pregnant with baby girl,   Pt continues to have difficulty with performing housework, bending, lifting, prolonged walking, and sitting  Pt is currently out of work as RN  Pt has improved ambulation and no longer has pain in upper back  Pt has pain in low back and glutes, relieved with stretching and massage  Pt feels that therapy has been helping  Pt may also benefit from counseling assessment to assist with feelings of anxiety and feeling overwhelmed  Carri Palacios would benefit from continued formal physical therapy to address impairments as detailed, decrease pain, and restore maximal level of function for all home, work, and mobility tasks  Thank you for this referral     Impairments: abnormal gait, abnormal or restricted ROM, impaired physical strength, pain with function, poor posture  and poor body mechanics  Understanding of Dx/Px/POC: good   Prognosis: good    Goals  Short-term goals:  1  Pt will decrease pain by 1-2 points within 4 weeks  - not met  2  Pt will improve ROM by 5-10 degrees within 4 weeks  - met  3  Pt will improve strength by 1/2 grade within 4 weeks  - met  4  Pt will improve physical FS score by 10 points by discharge  - not met  Long-term goals:  1  Pt will demonstrate independence with HEP by discharge  - met  2  Pt will return to all home tasks with good body mechanics and pain <3/10 by discharge  - partially met  3  Pt will demonstrate good body mechanics with progression of pregnancy without cuing by discharge  - partially met    Plan  Patient would benefit from: skilled PT  Planned modality interventions: cryotherapy and thermotherapy: hydrocollator packs  Planned therapy interventions: joint mobilization, manual therapy, neuromuscular re-education, patient education, strengthening, stretching, therapeutic exercise, flexibility, functional ROM exercises, home exercise program, abdominal trunk stabilization, behavior modification, body mechanics training, postural training, therapeutic activities and massage  Frequency: 2x week  Duration in weeks: 4  Treatment plan discussed with: patient        Subjective Evaluation    History of Present Illness  Mechanism of injury: Pt sees OB on 19 and then sees them weekly  Pt has been managing GDM with diet and has been testing glucose 4x/day  Pt is not currently working  Pt no longer has upper back pain  Pt reports lower back pain toward bilateral hips and also over bilateral groin and lower abdominals  Pt has next ultrasound on 19  On more active days, pt states that pain can shoot down bilateral legs  Pt feels relief with pelvic tilts and wall sags  Pt has been trying to  items with her toes to avoid bending  Pt has been climbing stairs slowly but is able to do so reciprocally  Pt has had more constipation recently  Pt has had headaches with her diet modifications  Pt has been feeling anxious with what is to come  Pt feels fatigued and limited in her abilities, performing one chore at a time and feeling very fatigued  Pt tends to feel worse over the course of the day  Pt's mom will be her support person during labor  Pt has most difficulty with walking, bending, changing positions in bed, and general movement  Pt feels that therapy has been helping her  Pt feels that stretches have been more beneficial at this time than strengthening    Pain  At best pain ratin  At worst pain ratin  Location: low back, SI  Quality: sharp and dull ache  Relieving factors: rest  Aggravating factors: stair climbing and walking    Social Support  Lives with: parents (sister also)    Employment status: working (RN, currently not working)    Diagnostic Tests    FCE comments: Ultrasound- polycystic ovaryPatient Goals  Patient goal: strengthening my back- partially met        Objective     Static Posture     Comments  Increased lordosis with pregnancy, + Gillet's on R    Palpation   Left   Tenderness of the erector spinae and lumbar paraspinals  Right   Tenderness of the erector spinae and lumbar paraspinals  Additional Palpation Details  Painful over bilateral glutes    Neurological Testing     Sensation     Lumbar   Left   Intact: light touch    Right   Intact: light touch    Reflexes   Left   Patellar (L4): trace (1+)  Achilles (S1): normal (2+)  Clonus sign: negative    Right   Patellar (L4): trace (1+)  Achilles (S1): normal (2+)  Clonus sign: negative    Active Range of Motion     Lumbar   Flexion: 70 (pt states that she must lock out knees) degrees   Extension: 40 (stretching in belly) degrees   Left lateral flexion: 27 degrees       Right lateral flexion: 25 degrees   Left rotation: Active left lumbar rotation: 100%   Right rotation: Active right lumbar rotation: 100%     Strength/Myotome Testing     Left Hip   Planes of Motion   Flexion: 5 (some pain in knee)  External rotation: 4 (pain)  Internal rotation: 4+    Right Hip   Planes of Motion   Flexion: 5  External rotation: 4 (pain)  Internal rotation: 4+    Left Knee   Flexion: 5  Extension: 5    Right Knee   Flexion: 5  Extension: 5    Left Ankle/Foot   Dorsiflexion: 5  Plantar flexion: 5  Inversion: 5  Eversion: 5    Right Ankle/Foot   Dorsiflexion: 5  Plantar flexion: 5  Inversion: 5  Eversion: 5    Additional Strength Details  Groin pain with bilateral resisted IR/ER    Tests     Lumbar     Left   Negative crossed SLR, passive SLR and slump test      Right   Negative crossed SLR, passive SLR and slump test      Left Hip   Negative RENETTA and FADIR  Right Hip   Positive RENETTA  Negative FADIR       Additional Tests Details  Did not reassess to avoid lying pt supine    Ambulation     Observational Gait   Gait: asymmetric   Decreased walking speed  Base of support: increased    Functional Assessment      Squat    Pain, left tibial anterior translation beyond toes and right tibial anterior translation beyond toes                Precautions: 2nd trimester (25 weeks at IE, due 11/8/19), migraines, depression      Manual  9/10 9/12 8/9 8/13 8/20 8/22 8/26 8/28 9/4 9/6   BP Pre-tx  110/74 mmHg 104/68 mmHg, L /86 mmHg, L /78 mmHg, L /82 mmHg, L /78 mmHg, L /62 mmHg, L /76 mmHg, L /70 mmHg, L UE   STM to lumbar PSM in supported lumbar flexion  ED PK np PK                                                   Exercise Diary  9/10 9/12 8/9 8/13 8/20 8/22 8/26 8/28 9/4 9/6   Rolling under  reviewed np np np np np np np np   Chin tucks 5" x 10 pball nv 10x5" 10x5" 10x5"  10 x 5"  pball  5" x 10 pball  5" x 10 pball  5" x 10   scap retraction 15 c 5" nv 10x5" 10x5" 10x5"  15 x 5"  pball  5" x 15 pball  5" x 15 pball  5" x 15   UBE retro 5' dc 5' 5' 5' 5' nv 5' 5' Pt declined   TB shoulder ext  dc OTB 15x5" OTB 15x5" Grn  15x5" Grn  15x5" Grn  15x5" Grn  15x5" Grn  15x5" nv   TB low rows  dc OTB 15x5" OTB 15x5" Grn  15x5" Grn  15x5" Grn  15x5" Grn  15x5" Grn  15x5" nv   Wall sags 5" x 10 nv nv 5" x 5 5" x 10 5" x 10 5" x 10 5" x 10 5" x 10 nv   APT/PPT pball  2 x 10 nv 10x ea 10x ea 10x ea 10x ea 10x ea pball  10x ea pball  10x ea pball  10x ea   TA iso  10x5" 15x5" 15x5" 15x5"  15x5" pball  5" x 15 nv nv   TA + ball squeeze pball  15 c 5" nv 15x5"  15x5" 15x5"  15x5" pball  5" x 15 pball  5" x 15 pball  5" x 15   TA + hip TB ab pball  GTB  5" x 15 nv OTB 10x5" OTB 15x5" gtb 15x5"  gtb 15x5" pball  np pball  5" x 15  gtg pball  5" x 15  gtg   TA + TB multifidus push/pull GTB  15 ea nv GTB 10x ea GTB 10x ea GTB 10x ea  nv np np nv   TA + heel slide  dc   10x ea nv nv nv nv nv   Mini squat @ wall  dc 5" x 10 nv 5" x 10 nv   QL stretch seated  seated ladder nv     nv  pball 3" x 10 ea nv   UE wall slides: with white bolster 10 nv     x10 nv x10 nv   Lumbar flexion 3 way  nv           Seated piriformis stretch  nv           Rhomboid stretch @ HR  nv           Doorway pec stretch  nv               Modalities  9/10 9/12 8/9 8/13  8/22 8/26 8/28 9/4 9/6   MH w/ extra towels Prn np np declined declined   np

## 2019-09-13 ENCOUNTER — HOSPITAL ENCOUNTER (EMERGENCY)
Facility: HOSPITAL | Age: 24
Discharge: HOME | End: 2019-09-13
Attending: EMERGENCY MEDICINE
Payer: COMMERCIAL

## 2019-09-13 VITALS
TEMPERATURE: 97.6 F | OXYGEN SATURATION: 96 % | RESPIRATION RATE: 18 BRPM | HEART RATE: 105 BPM | SYSTOLIC BLOOD PRESSURE: 98 MMHG | BODY MASS INDEX: 39.63 KG/M2 | WEIGHT: 196.21 LBS | DIASTOLIC BLOOD PRESSURE: 59 MMHG

## 2019-09-13 DIAGNOSIS — R11.2 NAUSEA AND VOMITING: ICD-10-CM

## 2019-09-13 DIAGNOSIS — R19.7 DIARRHEA: ICD-10-CM

## 2019-09-13 DIAGNOSIS — N39.0 ACUTE URINARY TRACT INFECTION: ICD-10-CM

## 2019-09-13 DIAGNOSIS — R10.13 ACUTE EPIGASTRIC PAIN: Primary | ICD-10-CM

## 2019-09-13 LAB
ALBUMIN SERPL BCP-MCNC: 2.2 G/DL (ref 3.5–5)
ALP SERPL-CCNC: 150 U/L (ref 46–116)
ALT SERPL W P-5'-P-CCNC: 34 U/L (ref 12–78)
ANION GAP SERPL CALCULATED.3IONS-SCNC: 11 MMOL/L (ref 4–13)
AST SERPL W P-5'-P-CCNC: 27 U/L (ref 5–45)
BACTERIA UR QL AUTO: ABNORMAL /HPF
BASOPHILS # BLD AUTO: 0.02 THOUSANDS/ΜL (ref 0–0.1)
BASOPHILS NFR BLD AUTO: 0 % (ref 0–1)
BILIRUB SERPL-MCNC: 0.49 MG/DL (ref 0.2–1)
BILIRUB UR QL STRIP: NEGATIVE
BUN SERPL-MCNC: 4 MG/DL (ref 5–25)
CALCIUM SERPL-MCNC: 9 MG/DL (ref 8.3–10.1)
CHLORIDE SERPL-SCNC: 102 MMOL/L (ref 100–108)
CLARITY UR: CLEAR
CLARITY, POC: CLEAR
CO2 SERPL-SCNC: 24 MMOL/L (ref 21–32)
COLOR UR: YELLOW
COLOR, POC: YELLOW
CREAT SERPL-MCNC: 0.53 MG/DL (ref 0.6–1.3)
EOSINOPHIL # BLD AUTO: 0.03 THOUSAND/ΜL (ref 0–0.61)
EOSINOPHIL NFR BLD AUTO: 0 % (ref 0–6)
ERYTHROCYTE [DISTWIDTH] IN BLOOD BY AUTOMATED COUNT: 13.8 % (ref 11.6–15.1)
GFR SERPL CREATININE-BSD FRML MDRD: 134 ML/MIN/1.73SQ M
GLUCOSE SERPL-MCNC: 94 MG/DL (ref 65–140)
GLUCOSE UR STRIP-MCNC: NEGATIVE MG/DL
HCT VFR BLD AUTO: 36 % (ref 34.8–46.1)
HGB BLD-MCNC: 12.1 G/DL (ref 11.5–15.4)
HGB UR QL STRIP.AUTO: NEGATIVE
IMM GRANULOCYTES # BLD AUTO: 0.29 THOUSAND/UL (ref 0–0.2)
IMM GRANULOCYTES NFR BLD AUTO: 2 % (ref 0–2)
KETONES UR STRIP-MCNC: ABNORMAL MG/DL
LEUKOCYTE ESTERASE UR QL STRIP: ABNORMAL
LIPASE SERPL-CCNC: 83 U/L (ref 73–393)
LYMPHOCYTES # BLD AUTO: 0.98 THOUSANDS/ΜL (ref 0.6–4.47)
LYMPHOCYTES NFR BLD AUTO: 8 % (ref 14–44)
MCH RBC QN AUTO: 31 PG (ref 26.8–34.3)
MCHC RBC AUTO-ENTMCNC: 33.6 G/DL (ref 31.4–37.4)
MCV RBC AUTO: 92 FL (ref 82–98)
MONOCYTES # BLD AUTO: 0.81 THOUSAND/ΜL (ref 0.17–1.22)
MONOCYTES NFR BLD AUTO: 7 % (ref 4–12)
NEUTROPHILS # BLD AUTO: 10.41 THOUSANDS/ΜL (ref 1.85–7.62)
NEUTS SEG NFR BLD AUTO: 83 % (ref 43–75)
NITRITE UR QL STRIP: NEGATIVE
NON-SQ EPI CELLS URNS QL MICRO: ABNORMAL /HPF
NRBC BLD AUTO-RTO: 0 /100 WBCS
PH UR STRIP.AUTO: 6 [PH] (ref 4.5–8)
PLATELET # BLD AUTO: 309 THOUSANDS/UL (ref 149–390)
PMV BLD AUTO: 9.1 FL (ref 8.9–12.7)
POTASSIUM SERPL-SCNC: 3.8 MMOL/L (ref 3.5–5.3)
PROT SERPL-MCNC: 6.8 G/DL (ref 6.4–8.2)
PROT UR STRIP-MCNC: NEGATIVE MG/DL
RBC # BLD AUTO: 3.9 MILLION/UL (ref 3.81–5.12)
RBC #/AREA URNS AUTO: ABNORMAL /HPF
SODIUM SERPL-SCNC: 137 MMOL/L (ref 136–145)
SP GR UR STRIP.AUTO: 1.02 (ref 1–1.03)
UROBILINOGEN UR QL STRIP.AUTO: 0.2 E.U./DL
WBC # BLD AUTO: 12.54 THOUSAND/UL (ref 4.31–10.16)
WBC #/AREA URNS AUTO: ABNORMAL /HPF

## 2019-09-13 PROCEDURE — 99285 EMERGENCY DEPT VISIT HI MDM: CPT | Performed by: EMERGENCY MEDICINE

## 2019-09-13 PROCEDURE — 81001 URINALYSIS AUTO W/SCOPE: CPT

## 2019-09-13 PROCEDURE — 80053 COMPREHEN METABOLIC PANEL: CPT | Performed by: EMERGENCY MEDICINE

## 2019-09-13 PROCEDURE — 85025 COMPLETE CBC W/AUTO DIFF WBC: CPT | Performed by: EMERGENCY MEDICINE

## 2019-09-13 PROCEDURE — 96361 HYDRATE IV INFUSION ADD-ON: CPT

## 2019-09-13 PROCEDURE — 99283 EMERGENCY DEPT VISIT LOW MDM: CPT

## 2019-09-13 PROCEDURE — 96374 THER/PROPH/DIAG INJ IV PUSH: CPT

## 2019-09-13 PROCEDURE — 96375 TX/PRO/DX INJ NEW DRUG ADDON: CPT

## 2019-09-13 PROCEDURE — 83690 ASSAY OF LIPASE: CPT | Performed by: EMERGENCY MEDICINE

## 2019-09-13 PROCEDURE — 36415 COLL VENOUS BLD VENIPUNCTURE: CPT | Performed by: EMERGENCY MEDICINE

## 2019-09-13 PROCEDURE — 87086 URINE CULTURE/COLONY COUNT: CPT

## 2019-09-13 RX ORDER — METOCLOPRAMIDE 10 MG/1
10 TABLET ORAL EVERY 6 HOURS
Qty: 10 TABLET | Refills: 0 | Status: SHIPPED | OUTPATIENT
Start: 2019-09-13 | End: 2019-09-25 | Stop reason: ALTCHOICE

## 2019-09-13 RX ORDER — METOCLOPRAMIDE HYDROCHLORIDE 5 MG/ML
10 INJECTION INTRAMUSCULAR; INTRAVENOUS ONCE
Status: COMPLETED | OUTPATIENT
Start: 2019-09-13 | End: 2019-09-13

## 2019-09-13 RX ORDER — MAGNESIUM HYDROXIDE/ALUMINUM HYDROXICE/SIMETHICONE 120; 1200; 1200 MG/30ML; MG/30ML; MG/30ML
30 SUSPENSION ORAL ONCE
Status: COMPLETED | OUTPATIENT
Start: 2019-09-13 | End: 2019-09-13

## 2019-09-13 RX ORDER — CEPHALEXIN 500 MG/1
500 CAPSULE ORAL 2 TIMES DAILY
Qty: 14 CAPSULE | Refills: 0 | Status: SHIPPED | OUTPATIENT
Start: 2019-09-13 | End: 2019-09-25 | Stop reason: ALTCHOICE

## 2019-09-13 RX ORDER — LIDOCAINE HYDROCHLORIDE 20 MG/ML
15 SOLUTION OROPHARYNGEAL ONCE
Status: COMPLETED | OUTPATIENT
Start: 2019-09-13 | End: 2019-09-13

## 2019-09-13 RX ORDER — SUCRALFATE 1 G/1
1 TABLET ORAL 4 TIMES DAILY
Qty: 10 TABLET | Refills: 0 | Status: SHIPPED | OUTPATIENT
Start: 2019-09-13 | End: 2019-09-25 | Stop reason: ALTCHOICE

## 2019-09-13 RX ORDER — SUCRALFATE ORAL 1 G/10ML
1000 SUSPENSION ORAL ONCE
Status: COMPLETED | OUTPATIENT
Start: 2019-09-13 | End: 2019-09-13

## 2019-09-13 RX ADMIN — ALUMINUM HYDROXIDE, MAGNESIUM HYDROXIDE, AND SIMETHICONE 30 ML: 200; 200; 20 SUSPENSION ORAL at 08:30

## 2019-09-13 RX ADMIN — METOCLOPRAMIDE 10 MG: 5 INJECTION, SOLUTION INTRAMUSCULAR; INTRAVENOUS at 08:30

## 2019-09-13 RX ADMIN — FAMOTIDINE 20 MG: 10 INJECTION, SOLUTION INTRAVENOUS at 08:54

## 2019-09-13 RX ADMIN — LIDOCAINE HYDROCHLORIDE 15 ML: 20 SOLUTION ORAL; TOPICAL at 08:30

## 2019-09-13 RX ADMIN — SUCRALFATE 1000 MG: 1 SUSPENSION ORAL at 08:54

## 2019-09-13 RX ADMIN — SODIUM CHLORIDE 1000 ML: 0.9 INJECTION, SOLUTION INTRAVENOUS at 08:30

## 2019-09-13 NOTE — ED PROVIDER NOTES
History  Chief Complaint   Patient presents with    Vomiting     Pt c/o vomiting and diarrhea since last night, c/o epigastric pain and lower abdominal cramping  Pt states that she is 32 weeks pregnant  feels fetal movement  OB states to have pt evaluated in ED       21 y o  F at approx 32 weeks pregnant p/w N/V/D x yesterday  Associated with stabbing/burning epigastric pain  Pt states she had lower abd cramps last night, but none currently  Denies vaginal bleeding/leaking  OBGYN: Chelsea Ventura      History provided by:  Patient   used: No    Vomiting   Duration:  1 day  Chronicity:  New  Recent urination:  Normal  Relieved by:  Nothing  Worsened by:  Nothing  Ineffective treatments:  None tried  Associated symptoms: abdominal pain (Stabbing/burning epigastric pain, lower abd cramps), diarrhea and myalgias    Associated symptoms: no chills, no cough and no fever    Risk factors: pregnant    Risk factors: no prior abdominal surgery, no sick contacts and no travel to endemic areas        Prior to Admission Medications   Prescriptions Last Dose Informant Patient Reported? Taking?    ACCU-CHEK FASTCLIX LANCETS MISC 9/13/2019 at Unknown time Self No Yes   Sig: Test fasting and 2 hours after start of meals, 4 times daily   ACCU-CHEK GUIDE test strip 9/13/2019 at Unknown time Self No Yes   Sig: Test fasting and 2 hours after start of meals   Prenatal Vit-Fe Fumarate-FA (PRENATAL VITAMIN PLUS LOW IRON) 27-1 MG TABS Past Week at Unknown time Self Yes Yes   Sig: Take 1 tablet by mouth daily   loratadine (CLARITIN) 10 mg tablet Past Week at Unknown time Self No Yes   Sig: Take 1 tablet (10 mg total) by mouth daily      Facility-Administered Medications: None       Past Medical History:   Diagnosis Date    Broken arm     Left arm     Depression     Diet controlled gestational diabetes mellitus (GDM) in third trimester 8/28/2019    Migraine     Polycystic ovary syndrome     Varicella        Past Surgical History:   Procedure Laterality Date    SKIN GRAFT      Full-thickness burn on back- reportedly complication of liposuction procedure in [place] summer 2013        Family History   Problem Relation Age of Onset    Migraines Mother     Hyperlipidemia Maternal Grandmother     Diabetes Maternal Grandmother     Thyroid disease Maternal Grandmother     Stroke Maternal Grandmother     Hypertension Paternal Grandmother     Heart disease Paternal Grandmother     Seizures Family      I have reviewed and agree with the history as documented  Social History     Tobacco Use    Smoking status: Former Smoker    Smokeless tobacco: Never Used    Tobacco comment: Recreational Hookah   Substance Use Topics    Alcohol use: Not Currently     Comment: socially    Drug use: No        Review of Systems   Constitutional: Negative for appetite change, chills and fever  HENT: Negative for congestion and rhinorrhea  Respiratory: Negative for cough  Gastrointestinal: Positive for abdominal distention ( ), abdominal pain (Stabbing/burning epigastric pain, lower abd cramps), diarrhea and vomiting  Negative for blood in stool, constipation and nausea  Genitourinary: Negative for dysuria, flank pain, frequency, hematuria, urgency, vaginal bleeding and vaginal discharge  Musculoskeletal: Positive for myalgias  Negative for back pain  All other systems reviewed and are negative  Physical Exam  Physical Exam   Constitutional: She is oriented to person, place, and time  She appears well-developed and well-nourished  No distress  HENT:   Head: Normocephalic  Mouth/Throat: Oropharynx is clear and moist and mucous membranes are normal    Neck: Normal range of motion  Neck supple  Cardiovascular: Normal rate, regular rhythm, normal heart sounds and intact distal pulses  Exam reveals no gallop and no friction rub  No murmur heard  Pulmonary/Chest: Effort normal and breath sounds normal  No respiratory distress   She has no wheezes  She has no rales  Abdominal: Soft  Normal appearance and bowel sounds are normal  She exhibits no distension and no mass  There is no hepatosplenomegaly  There is tenderness in the epigastric area  There is no rigidity, no rebound, no guarding, no CVA tenderness, no tenderness at McBurney's point and negative Echols's sign  Gravid abdomen   Lymphadenopathy:     She has no cervical adenopathy  Neurological: She is alert and oriented to person, place, and time  Skin: Skin is warm, dry and intact  No rash noted  No pallor  Nursing note and vitals reviewed        Vital Signs  ED Triage Vitals [09/13/19 0805]   Temperature Pulse Respirations Blood Pressure SpO2   97 6 °F (36 4 °C) (!) 111 18 110/56 98 %      Temp Source Heart Rate Source Patient Position - Orthostatic VS BP Location FiO2 (%)   Oral Monitor Sitting Right arm --      Pain Score       3           Vitals:    09/13/19 0805 09/13/19 0901   BP: 110/56 98/59   Pulse: (!) 111 105   Patient Position - Orthostatic VS: Sitting Lying         Visual Acuity      ED Medications  Medications   sodium chloride 0 9 % bolus 1,000 mL (1,000 mL Intravenous New Bag 9/13/19 0830)   metoclopramide (REGLAN) injection 10 mg (10 mg Intravenous Given 9/13/19 0830)   aluminum-magnesium hydroxide-simethicone (MYLANTA) 200-200-20 mg/5 mL oral suspension 30 mL (30 mL Oral Given 9/13/19 0830)   Lidocaine Viscous HCl (XYLOCAINE) 2 % mucosal solution 15 mL (15 mL Swish & Spit Given 9/13/19 0830)   famotidine (PEPCID) injection 20 mg (20 mg Intravenous Given 9/13/19 0854)   sucralfate (CARAFATE) oral suspension 1,000 mg (1,000 mg Oral Given 9/13/19 0854)       Diagnostic Studies  Results Reviewed     Procedure Component Value Units Date/Time    Urine Microscopic [[id number]]  (Abnormal) Collected:  09/13/19 0850    Lab Status:  Final result Specimen:  Urine, Clean Catch Updated:  09/13/19 0920     RBC, UA None Seen /hpf      WBC, UA 10-20 /hpf      Epithelial Cells Moderate /hpf      Bacteria, UA Moderate /hpf     POCT urinalysis dipstick [[id number]]  (Normal) Resulted:  09/13/19 0851    Lab Status:  Final result Specimen:  Urine Updated:  09/13/19 0857     Color, UA yellow     Clarity, UA clear    Urine culture [[id number]] Collected:  09/13/19 0850    Lab Status:   In process Specimen:  Urine, Clean Catch Updated:  09/13/19 0855    Comprehensive metabolic panel [[id number]]  (Abnormal) Collected:  09/13/19 0826    Lab Status:  Final result Specimen:  Blood from Arm, Right Updated:  09/13/19 0853     Sodium 137 mmol/L      Potassium 3 8 mmol/L      Chloride 102 mmol/L      CO2 24 mmol/L      ANION GAP 11 mmol/L      BUN 4 mg/dL      Creatinine 0 53 mg/dL      Glucose 94 mg/dL      Calcium 9 0 mg/dL      AST 27 U/L      ALT 34 U/L      Alkaline Phosphatase 150 U/L      Total Protein 6 8 g/dL      Albumin 2 2 g/dL      Total Bilirubin 0 49 mg/dL      eGFR 134 ml/min/1 73sq m     Narrative:       [place] guidelines for Chronic Kidney Disease (CKD):     Stage 1 with normal or high GFR (GFR > 90 mL/min/1 73 square meters)    Stage 2 Mild CKD (GFR = 60-89 mL/min/1 73 square meters)    Stage 3A Moderate CKD (GFR = 45-59 mL/min/1 73 square meters)    Stage 3B Moderate CKD (GFR = 30-44 mL/min/1 73 square meters)    Stage 4 Severe CKD (GFR = 15-29 mL/min/1 73 square meters)    Stage 5 End Stage CKD (GFR <15 mL/min/1 73 square meters)  Note: GFR calculation is accurate only with a steady state creatinine    Lipase [[id number]]  (Normal) Collected:  09/13/19 0826    Lab Status:  Final result Specimen:  Blood from Arm, Right Updated:  09/13/19 0853     Lipase 83 u/L     ED Urine Macroscopic [[id number]]  (Abnormal) Collected:  09/13/19 0850    Lab Status:  Final result Specimen:  Urine Updated:  09/13/19 0851     Color, UA Yellow     Clarity, UA Clear     pH, UA 6 0     Leukocytes, UA Trace     Nitrite, UA Negative     Protein, UA Negative mg/dl      Glucose, UA Negative mg/dl      Ketones, UA 40 (2+) mg/dl      Urobilinogen, UA 0 2 E U /dl      Bilirubin, UA Negative     Blood, UA Negative     Specific Gravity, UA 1 020    Narrative:       CLINITEK RESULT    CBC and differential [[id number]]  (Abnormal) Collected:  09/13/19 0826    Lab Status:  Final result Specimen:  Blood from Arm, Right Updated:  09/13/19 0841     WBC 12 54 Thousand/uL      RBC 3 90 Million/uL      Hemoglobin 12 1 g/dL      Hematocrit 36 0 %      MCV 92 fL      MCH 31 0 pg      MCHC 33 6 g/dL      RDW 13 8 %      MPV 9 1 fL      Platelets 426 Thousands/uL      nRBC 0 /100 WBCs      Neutrophils Relative 83 %      Immat GRANS % 2 %      Lymphocytes Relative 8 %      Monocytes Relative 7 %      Eosinophils Relative 0 %      Basophils Relative 0 %      Neutrophils Absolute 10 41 Thousands/µL      Immature Grans Absolute 0 29 Thousand/uL      Lymphocytes Absolute 0 98 Thousands/µL      Monocytes Absolute 0 81 Thousand/µL      Eosinophils Absolute 0 03 Thousand/µL      Basophils Absolute 0 02 Thousands/µL                  No orders to display              Procedures  Procedures       ED Course  ED Course as of Sep 13 0942   Fri Sep 13, 2019   0830 Pt given meds      0842 20 04 on 8/14/19 and 17 91 on 4/23/19   WBC(!): 12 54   0851 Pt reports nausea is gone, but still having pain  Will give pepcid and Carafate  0854 Pt given Pepcid and Carafate      0933 Updated pt on results  Pt states abd pain is better  2585 Discussed case with OBGYN resident  He states pt does not need to be seen upstairs and can f/u with Dr Edis Aparicio                                    MDM  Number of Diagnoses or Management Options     Amount and/or Complexity of Data Reviewed  Clinical lab tests: reviewed and ordered  Tests in the medicine section of CPT®: ordered and reviewed        Disposition  Final diagnoses:   Acute epigastric pain   Nausea and vomiting   Diarrhea   Acute urinary tract infection     Time reflects when diagnosis was documented in both MDM as applicable and the Disposition within this note     Time User Action Codes Description Comment    9/13/2019  9:35 AM Amy Blanco L Add [R10 13] Acute epigastric pain     9/13/2019  9:35 AM Amy Blanco L Add [R11 2] Nausea and vomiting     9/13/2019  9:35 AM Amy Blanco L Add [R19 7] Diarrhea     9/13/2019  9:35 AM Amy Blanco L Add [N39 0] Acute urinary tract infection       ED Disposition     ED Disposition Condition Date/Time Comment    Discharge Stable Fri Sep 13, 2019  9:35 AM Corey Grblair Dunnejesus discharge to home/self care  Follow-up Information     Follow up With Specialties Details Why Contact Info    Cammie Jones MD Obstetrics and Gynecology, Obstetrics, Gynecology Schedule an appointment as soon as possible for a visit   [address]  [place] [address] [facility]  [phone]            Patient's Medications   Discharge Prescriptions    CEPHALEXIN (KEFLEX) 500 MG CAPSULE    Take 1 capsule (500 mg total) by mouth 2 (two) times a day for 7 days       Start Date: 9/13/2019 End Date: 9/20/2019       Order Dose: 500 mg       Quantity: 14 capsule    Refills: 0    METOCLOPRAMIDE (REGLAN) 10 MG TABLET    Take 1 tablet (10 mg total) by mouth every 6 (six) hours       Start Date: 9/13/2019 End Date: --       Order Dose: 10 mg       Quantity: 10 tablet    Refills: 0    SUCRALFATE (CARAFATE) 1 G TABLET    Take 1 tablet (1 g total) by mouth 4 (four) times a day       Start Date: 9/13/2019 End Date: --       Order Dose: 1 g       Quantity: 10 tablet    Refills: 0     No discharge procedures on file      ED Provider  Electronically Signed by           Alyx Hearn, DO  09/13/19 6416

## 2019-09-14 LAB — BACTERIA UR CULT: NORMAL

## 2019-09-17 ENCOUNTER — OFFICE VISIT (OUTPATIENT)
Dept: PHYSICAL THERAPY | Facility: CLINIC | Age: 24
End: 2019-09-17
Payer: COMMERCIAL

## 2019-09-17 DIAGNOSIS — M54.9 UPPER BACK PAIN: Primary | ICD-10-CM

## 2019-09-17 PROCEDURE — 97110 THERAPEUTIC EXERCISES: CPT

## 2019-09-17 PROCEDURE — 97140 MANUAL THERAPY 1/> REGIONS: CPT

## 2019-09-17 PROCEDURE — 97112 NEUROMUSCULAR REEDUCATION: CPT

## 2019-09-17 NOTE — PROGRESS NOTES
Daily Note     Today's date: 2019  Patient name: Isai Payton  : 1995  MRN: [id number]  Referring provider: Grayson Murphy MD  Dx:   Encounter Diagnosis     ICD-10-CM    1  Upper back pain M54 9                   Subjective: Pt presents to PT denying mid to low back pain but reports "soreness" in B groin grading the pain a 4/10 at it's worst   She reports groin soreness is not bad today grading the soreness a 1/10  Objective: See treatment diary below      Assessment: Tolerated treatment well  Pt unable to perform lumbar stretch to left or right secondary to pregnancy belly however performed remaining new TE with minimal difficulty  Patient demonstrated fatigue post treatment, exhibited good technique with therapeutic exercises and would benefit from continued PT  Plan: Continue per plan of care        Precautions: 2nd trimester (25 weeks at IE, due 19), migraines, depression      Manual  9/10 9/12 9/17      9/4 9   BP Pre-tx  110/74 mmHg 101/60 mmHg      108/76 mmHg, L /70 mmHg, L UE   STM to lumbar PSM in supported lumbar flexion  ED PK                                                     Exercise Diary  9/10 9/12 9/17      9/4 9/6   Rolling under  reviewed       np np   Chin tucks 5" x 10 pball nv 5" x 10 pball      pball  5" x 10 pball  5" x 10   scap retraction 15 c 5" nv 15 c 5" pball      pball  5" x 15 pball  5" x 15   UBE retro 5' dc --      5' Pt declined   TB shoulder ext  dc --      Grn  15x5" nv   TB low rows  dc --      Grn  15x5" nv   Wall sags 5" x 10 nv       5" x 10 nv   APT/PPT pball  2 x 10 nv pball  2 x 10      pball  10x ea pball  10x ea   TA iso  10x5"       nv nv   TA + ball squeeze pball  15 c 5" nv       pball  5" x 15 pball  5" x 15   TA + hip TB ab pball  GTB  5" x 15 nv       pball  5" x 15  gtg pball  5" x 15  gtg   TA + TB multifidus push/pull GTB  15 ea nv       np nv   TA + heel slide  dc --      nv nv   Mini squat @ wall  dc --      5" x 10 nv   QL stretch seated  seatedladder nv seatedladder 10x      3" x 10 ea nv   UE wall slides: with white bolster 10 nv       x10 nv   Lumbar flexion 3 way  nv 10" x 5, flex           Seated piriformis stretch  nv 15" x 5 ea          Rhomboid stretch @ HR  nv 15" x 5          Doorway pec stretch  nv 15" x 5              Modalities  9/10 9/12 9/17      9/4 9/6   MH w/ extra towels Prn np np np

## 2019-09-18 ENCOUNTER — DOCUMENTATION (OUTPATIENT)
Dept: PERINATAL CARE | Facility: CLINIC | Age: 24
End: 2019-09-18

## 2019-09-18 NOTE — PROGRESS NOTES
Date:  19  RE: Whit Kapadia    : 1995  NERY: Estimated Date of Delivery: 19  EGA: 32w5d    Diet controlled GDM third trimester        Current regimen:  1800 calorie GDM meal plan including 3 meals and 3 snacks daily with protein at each  SMBG 4 times per day, fasting and 2 hours after the start of each meal via Accu-chek Guide meter  Plan:  Continue current regimen  Pt indicated on log that she was sick for a couple of days and did not test blood sugars  Note: elevated fasting > 100 mg/dL morning of feeling sick  Otherwise fasting and majority of PP values WDL       () US: Fetal Growth and ZAKI normal    Date due to report next:  Monday,      Thanh Stevenson RD, LDN  Diabetes Educator   [facility]

## 2019-09-20 ENCOUNTER — OFFICE VISIT (OUTPATIENT)
Dept: PHYSICAL THERAPY | Facility: CLINIC | Age: 24
End: 2019-09-20
Payer: COMMERCIAL

## 2019-09-20 DIAGNOSIS — M54.9 UPPER BACK PAIN: Primary | ICD-10-CM

## 2019-09-20 PROCEDURE — 97112 NEUROMUSCULAR REEDUCATION: CPT

## 2019-09-20 PROCEDURE — 97110 THERAPEUTIC EXERCISES: CPT

## 2019-09-20 PROCEDURE — 97140 MANUAL THERAPY 1/> REGIONS: CPT

## 2019-09-20 NOTE — PROGRESS NOTES
Daily Note     Today's date: 2019  Patient name: Juan Fisher  : 1995  MRN: [id number]  Referring provider: Winter Umaña MD  Dx:   Encounter Diagnosis     ICD-10-CM    1  Upper back pain M54 9                   Subjective: Pt presents to PT reporting groin pain remains but not worse  She reports "slight pain" in low back  Objective: See treatment diary below      Assessment: Tolerated treatment well  Patient demonstrated fatigue post treatment, exhibited good technique with therapeutic exercises and would benefit from continued PT  Plan: Continue per plan of care        Precautions: 2nd trimester (25 weeks at IE, due 19), migraines, depression      Manual  9/10 9/12 9/17 9/20         BP Pre-tx  110/74 mmHg 101/60 mmHg 105/72 mmHg         STM to lumbar PSM in supported lumbar flexion  ED PK PK                                                    Exercise Diary  9/10 9/12 9/17 9/20         Rolling under  reviewed           Chin tucks 5" x 10 pball nv 5" x 10 pball nv         scap retraction 15 c 5" nv 15 c 5" pball nv         UBE retro 5' dc -- --         TB shoulder ext  dc -- --         TB low rows  dc -- --         Wall sags 5" x 10 nv  nv         APT/PPT pball  2 x 10 nv pball  2 x 10 pball  2 x 10         TA iso  10x5"           TA + ball squeeze pball  15 c 5" nv           TA + hip TB ab pball  GTB  5" x 15 nv           TA + TB multifidus push/pull GTB  15 ea nv  GTB  15 ea         TA + heel slide  dc --          Mini squat @ wall  dc --          QL stretch seated  seatedladder nv  seatedladder 10x         UE wall slides: with white bolster 10 nv           Lumbar flexion 3 way  nv 10" x 5, flex  10" x 5, flex          Seated piriformis stretch  nv 15" x 5 ea 15" x 5 ea         Rhomboid stretch @ HR  nv 15" x 5 15" x 5         Doorway pec stretch  nv 15" x 5 15" x 5             Modalities  9/10 9/12 9/17 9/20         MH w/ extra towels Prn np np np np

## 2019-09-23 ENCOUNTER — OFFICE VISIT (OUTPATIENT)
Dept: PHYSICAL THERAPY | Facility: CLINIC | Age: 24
End: 2019-09-23
Payer: COMMERCIAL

## 2019-09-23 DIAGNOSIS — M54.9 UPPER BACK PAIN: Primary | ICD-10-CM

## 2019-09-23 PROCEDURE — 97530 THERAPEUTIC ACTIVITIES: CPT

## 2019-09-23 PROCEDURE — 97110 THERAPEUTIC EXERCISES: CPT

## 2019-09-23 PROCEDURE — 97112 NEUROMUSCULAR REEDUCATION: CPT

## 2019-09-23 NOTE — PROGRESS NOTES
Daily Note     Today's date: 2019  Patient name: Rosa Mar  : 1995  MRN: [id number]  Referring provider: Zulma Myrick MD  Dx:   Encounter Diagnosis     ICD-10-CM    1  Upper back pain M54 9                   Subjective: Pt presents to PT reporting pain in groin is gone and minimal pain in mid back grading the pain a 2-3/10  Pt c/o R gastroc pain that began after a nap yesterday afternoon  Pt denies increased pain post PT session  Objective: See treatment diary below      Assessment: Tolerated treatment well  Assessed pt's L calf noting no redness, or warmth and - [place]; added gastroc stretch today  Patient demonstrated fatigue post treatment, exhibited good technique with therapeutic exercises and would benefit from continued PT  Plan: Continue per plan of care        Precautions: 3nd trimester (25 weeks at IE, due 19), migraines, depression      Manual  9/10 9/12 9/17 9/20 9/23        BP Pre-tx  110/74 mmHg 101/60 mmHg 105/72 mmHg 104/76 mmHg        STM to lumbar PSM in supported lumbar flexion  ED PK PK Held today                                                   Exercise Diary  9/10 9/12 9/17 9/20 9/23        Rolling under  reviewed           Chin tucks 5" x 10 pball nv 5" x 10 pball nv 5" x 10 pball        scap retraction 15 c 5" nv 15 c 5" pball nv 15 c 5" pball        UBE retro 5' dc -- -- --        TB shoulder ext  dc -- -- --        TB low rows  dc -- -- --        Wall sags 5" x 10 nv  nv         APT/PPT pball  2 x 10 nv pball  2 x 10 pball  2 x 10 pball  2 x 10        TA iso  10x5"           TA + ball squeeze pball  15 c 5" nv   pball  15 c 5"        TA + hip TB ab pball  GTB  5" x 15 nv   pball  GTB  5" x 15        TA + TB multifidus push/pull GTB  15 ea nv  GTB  15 ea GTB  15 ea        TA + heel slide  dc --          L Gastroc stretch     30" x 3        QL stretch seated  seatedladder nv  seatedladder 10x seatedladder 10x        UE wall slides: with white bolster 10 nv           Lumbar flexion 3 way  nv 10" x 5, flex  10" x 5, flex  10" x 5, flex         Seated piriformis stretch  nv 15" x 5 ea 15" x 5 ea 15" x 5 ea        Rhomboid stretch @ HR  nv 15" x 5 15" x 5 15" x 5        Doorway pec stretch  nv 15" x 5 15" x 5 15" x 5            Modalities  9/10 9/12 9/17 9/20 9/23        MH w/ extra towels Prn np np np np np

## 2019-09-24 DIAGNOSIS — Z34.90 PREGNANCY, UNSPECIFIED GESTATIONAL AGE: Primary | ICD-10-CM

## 2019-09-24 RX ORDER — VITAMIN A, VITAMIN C, VITAMIN D-3, VITAMIN E, VITAMIN B-1, VITAMIN B-2, NIACIN, VITAMIN B-6, CALCIUM, IRON, ZINC, COPPER 4000; 120; 400; 22; 1.84; 3; 20; 10; 1; 12; 200; 27; 25; 2 [IU]/1; MG/1; [IU]/1; MG/1; MG/1; MG/1; MG/1; MG/1; MG/1; UG/1; MG/1; MG/1; MG/1; MG/1
TABLET ORAL
Qty: 30 TABLET | Refills: 3 | Status: SHIPPED | OUTPATIENT
Start: 2019-09-24

## 2019-09-25 ENCOUNTER — ULTRASOUND (OUTPATIENT)
Dept: PERINATAL CARE | Facility: CLINIC | Age: 24
End: 2019-09-25
Payer: COMMERCIAL

## 2019-09-25 VITALS
HEIGHT: 59 IN | WEIGHT: 192.2 LBS | BODY MASS INDEX: 38.75 KG/M2 | HEART RATE: 102 BPM | DIASTOLIC BLOOD PRESSURE: 77 MMHG | SYSTOLIC BLOOD PRESSURE: 108 MMHG

## 2019-09-25 DIAGNOSIS — O99.210 OBESITY AFFECTING PREGNANCY, ANTEPARTUM: ICD-10-CM

## 2019-09-25 DIAGNOSIS — O24.410 DIET CONTROLLED GESTATIONAL DIABETES MELLITUS (GDM) IN THIRD TRIMESTER: Primary | ICD-10-CM

## 2019-09-25 DIAGNOSIS — Z3A.33 33 WEEKS GESTATION OF PREGNANCY: ICD-10-CM

## 2019-09-25 PROCEDURE — 76816 OB US FOLLOW-UP PER FETUS: CPT | Performed by: OBSTETRICS & GYNECOLOGY

## 2019-09-25 PROCEDURE — 99212 OFFICE O/P EST SF 10 MIN: CPT | Performed by: OBSTETRICS & GYNECOLOGY

## 2019-09-25 NOTE — LETTER
September 25, 2019     [place], [address] [address]    Patient: Ray Burris   YOB: 1995   Date of Visit: 9/25/2019       Dear Dr Meade Due: Thank you for referring Ray Burris to me for evaluation  Below are my notes for this consultation  If you have questions, please do not hesitate to call me  I look forward to following your patient along with you  Sincerely,        Fernanda Chanel MD        CC: No Recipients  Fernanda Chanel MD  9/25/2019  2:10 PM  Sign at close encounter    Please refer to the [facility] ultrasound report in Ob Procedures for additional information regarding the visit to the [facility]  today    Fernanda Chanel MD

## 2019-09-25 NOTE — PROGRESS NOTES
Please refer to the [facility] ultrasound report in Ob Procedures for additional information regarding the visit to the [facility]  today    Sophia Casarez MD

## 2019-09-26 ENCOUNTER — ROUTINE PRENATAL (OUTPATIENT)
Dept: OBGYN CLINIC | Facility: CLINIC | Age: 24
End: 2019-09-26
Payer: COMMERCIAL

## 2019-09-26 VITALS
DIASTOLIC BLOOD PRESSURE: 70 MMHG | BODY MASS INDEX: 38.78 KG/M2 | SYSTOLIC BLOOD PRESSURE: 110 MMHG | WEIGHT: 192 LBS | HEART RATE: 74 BPM

## 2019-09-26 DIAGNOSIS — Z23 NEED FOR DIPHTHERIA-TETANUS-PERTUSSIS (TDAP) VACCINE: Primary | ICD-10-CM

## 2019-09-26 DIAGNOSIS — Z3A.33 33 WEEKS GESTATION OF PREGNANCY: ICD-10-CM

## 2019-09-26 DIAGNOSIS — O24.410 DIET CONTROLLED GESTATIONAL DIABETES MELLITUS (GDM) IN THIRD TRIMESTER: ICD-10-CM

## 2019-09-26 PROCEDURE — 90715 TDAP VACCINE 7 YRS/> IM: CPT

## 2019-09-26 PROCEDURE — 90471 IMMUNIZATION ADMIN: CPT

## 2019-09-26 PROCEDURE — 99213 OFFICE O/P EST LOW 20 MIN: CPT | Performed by: OBSTETRICS & GYNECOLOGY

## 2019-09-26 PROCEDURE — 76818 FETAL BIOPHYS PROFILE W/NST: CPT | Performed by: OBSTETRICS & GYNECOLOGY

## 2019-09-26 NOTE — PROGRESS NOTES
Assessment     Pregnancy 33 and 6/7 weeks     Plan     28-week labs reviewed, normal  Consent signed  Follow up in 1 Week  Patient is here today for her routine 33 week obstetrical visit     She states that she is feeling well and has no complaints at this time  Baby is moving well  She denies any bleeding or loss of fluid or any abdominal pain  She had a level 2 scan at the  center recently which was within normal limits     Blood pressure is normal today and urine screens are all negative     I encouraged her to continue to eat well and also to take adequate amounts of fluid on a daily basis     She is taking her prenatal vitamins daily    Patient had reassuring  testing today for her A1 gestational diabetes  Blood sugars have been well controlled she is followed at the  center on a regular basis  All questions were answered for her      We will see her back in 1 weeks for routine prenatal visit     She was told to call should any problems or concerns arise during the interim      Subjective     Hugo Orozco is a 21 y o  female being seen today for her obstetrical visit  She is at 33w6d gestation  Patient reports backache, no bleeding, no leaking and occasional contractions  Fetal movement: normal     Menstrual History:  OB History        1    Para        Term                AB        Living           SAB        TAB        Ectopic        Multiple        Live Births                    Menarche age:   Patient's last menstrual period was 2018  The following portions of the patient's history were reviewed and updated as appropriate: allergies, current medications, past family history, past medical history, past social history, past surgical history and problem list     Review of Systems  Pertinent items are noted in HPI       Objective     /70   Pulse 74   Wt 87 1 kg (192 lb)   LMP 2018   BMI 38 78 kg/m²   FHT:  134 BPM Uterine Size: size equals dates   Presentation: cephalic

## 2019-09-26 NOTE — PATIENT INSTRUCTIONS
Patient is here today for her routine 33 week obstetrical visit     She states that she is feeling well and has no complaints at this time  Baby is moving well  She denies any bleeding or loss of fluid or any abdominal pain  She had a level 2 scan at the  center recently which was within normal limits     Blood pressure is normal today and urine screens are all negative     I encouraged her to continue to eat well and also to take adequate amounts of fluid on a daily basis     She is taking her prenatal vitamins daily    Patient had reassuring  testing today for her A1 gestational diabetes  Blood sugars have been well controlled she is followed at the  center on a regular basis       All questions were answered for her      We will see her back in 1 weeks for routine prenatal visit     She was told to call should any problems or concerns arise during the interim

## 2019-09-27 ENCOUNTER — APPOINTMENT (OUTPATIENT)
Dept: PHYSICAL THERAPY | Facility: CLINIC | Age: 24
End: 2019-09-27
Payer: COMMERCIAL

## 2019-09-27 ENCOUNTER — DOCUMENTATION (OUTPATIENT)
Dept: PERINATAL CARE | Facility: CLINIC | Age: 24
End: 2019-09-27

## 2019-09-27 DIAGNOSIS — M54.9 UPPER BACK PAIN: Primary | ICD-10-CM

## 2019-09-27 NOTE — PROGRESS NOTES
Date:  19  RE: Carina Han    : 1995  NERY: Estimated Date of Delivery: 19  EGA: 34w0d    Diet controlled GDM third trimester          Current regimen:  1800 calorie GDM meal plan including 3 meals and 3 snacks daily with protein at each  SMBG 4 times per day, fasting and 2 hours after the start of each meal via Accu-chek Guide meter  Plan:  Continue current regimen     () US: Fetal Growth and ZAKI normal    Date due to report next:  Thursday, 10/3/19     Mahsa Wright RD, LDN  Diabetes Educator   [facility]abetes [facility]regnancy [facility]

## 2019-09-30 ENCOUNTER — SOCIAL WORK (OUTPATIENT)
Dept: BEHAVIORAL/MENTAL HEALTH CLINIC | Facility: CLINIC | Age: 24
End: 2019-09-30
Payer: COMMERCIAL

## 2019-09-30 ENCOUNTER — OFFICE VISIT (OUTPATIENT)
Dept: PHYSICAL THERAPY | Facility: CLINIC | Age: 24
End: 2019-09-30
Payer: COMMERCIAL

## 2019-09-30 DIAGNOSIS — F33.1 MODERATE EPISODE OF RECURRENT MAJOR DEPRESSIVE DISORDER (HCC): Primary | ICD-10-CM

## 2019-09-30 DIAGNOSIS — M54.9 UPPER BACK PAIN: Primary | ICD-10-CM

## 2019-09-30 PROCEDURE — 90834 PSYTX W PT 45 MINUTES: CPT | Performed by: SOCIAL WORKER

## 2019-09-30 PROCEDURE — 97140 MANUAL THERAPY 1/> REGIONS: CPT

## 2019-09-30 PROCEDURE — 97110 THERAPEUTIC EXERCISES: CPT

## 2019-09-30 NOTE — PROGRESS NOTES
Daily Note     Today's date: 2019  Patient name: Frances Kwan  : 1995  MRN: [id number]  Referring provider: Caroline Vasquez MD  Dx:   Encounter Diagnosis     ICD-10-CM    1  Upper back pain M54 9                   Subjective: Pt presents to PT reporting increased mid to low back pain and stiffness secondary to activity over the weekend  Pt also reports lower pelvic discomfort  Pt reports decreased pain and stiffness post PT session  Pt arrived 10 mins late for TE; able to accommodate  Objective: See treatment diary below      Assessment: Tolerated treatment well  Pt demonstrates difficulty with some TE and stretches secondary to progressing pregnancy  Patient demonstrated fatigue post treatment, exhibited good technique with therapeutic exercises and would benefit from continued PT  Plan: Continue per plan of care        Precautions: 3nd trimester (25 weeks at IE, due 19), migraines, depression      Manual  9/10 9/12 9/17 9/20 9/23 9/30       BP Pre-tx  110/74 mmHg 101/60 mmHg 105/72 mmHg 104/76 mmHg 108/78 mmHg       STM to lumbar PSM in supported lumbar flexion  ED PK PK Held today PK                                                  Exercise Diary  9/10 9/12 9/17 9/20 9/23 9/30       Rolling under  reviewed           Chin tucks 5" x 10 pball nv 5" x 10 pball nv 5" x 10 pball 5" x 10 pball       scap retraction 15 c 5" nv 15 c 5" pball nv 15 c 5" pball        UBE retro 5' dc -- -- -- --       TB shoulder ext  dc -- -- -- --       TB low rows  dc -- -- -- --       Wall sags 5" x 10 nv  nv         APT/PPT pball  2 x 10 nv pball  2 x 10 pball  2 x 10 pball  2 x 10        TA iso  10x5"           TA + ball squeeze pball  15 c 5" nv   pball  15 c 5"        TA + hip TB ab pball  GTB  5" x 15 nv   pball  GTB  5" x 15        TA + TB multifidus push/pull GTB  15 ea nv  GTB  15 ea GTB  15 ea        TA + heel slide  dc --          L Gastroc stretch     30" x 3        QL stretch seated seatedladder nv  seatedladder 10x seatedladder 10x        UE wall slides: with white bolster 10 nv           Lumbar flexion 3 way  nv 10" x 5, flex  10" x 5, flex  10" x 5, flex  10" x 5, flex        Seated piriformis stretch  nv 15" x 5 ea 15" x 5 ea 15" x 5 ea 15" x 5 ea       Rhomboid stretch @ HR  nv 15" x 5 15" x 5 15" x 5 20" x 4       Doorway pec stretch  nv 15" x 5 15" x 5 15" x 5 20" x 4           Modalities  9/10 9/12 9/17 9/20 9/23 9/30       MH w/ extra towels Prn np np np np np np

## 2019-09-30 NOTE — PSYCH
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  Under investigation - criminal, unemployment, license investigation  During first trimester, worked at facility and patient  when pt and coworker were working at [employer]  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 [facility]  Minimal contact with coworker due to legal issues  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" which means pt can't work with minors, vulnerable pt or unsupervised  Plans to appeal CYS  Has  for criminal potential   Graduated in May 1180 and got license in 1641 and incident in May 2019  First pregnancy, unplanned, , [place] - mom and dad from there - pt born here - parents here  when pt was [age] - father lives in [place] - "basic relationship "   Met  in  [facility] in  - fling when there - no contact for 2yrs then resumed contact - visited in December and February and got  in February - submitted paperwork for him to join her here   is a   Both families happy about baby and marriage but issues with his sisters but everyone excited about baby  Counseling a few times at 11yo but only for a few sessions - traumatic issue then signed in to [facility] inpatient for 5 days at 16 - suicidal thoughts and took sleeping pills - told  when went that she took a bunch of tylenol PA  No self harm, no addictions, no other attempts, denies psychosis    At 11yo, mom arrested in another [address] for drugs for 4yrs- had two brothers and one sister - had to be  for yrs and moved to multiple different family members then she and brother moved around form different houses on their own  Sister then joined them - younger brother by one yr and sister 10yrs younger - dealt with kids/issues for 4yrs  Mom will be birth  - took breastfeeding class and takes childbirth 8hr course this weekend  Pt came to counseling due to hx of depression and noticed increased struggling with the work issues/situations - overwhelming - had own apartment when had job but couldn't pay rent when issues began - was making $32/hr  Facility closed/shut down now due to violations  Felt emotionally shutting down to deal     Fearful after baby comes, she will be hit with more emotions  No meds in past for depression/anxiety- feels has dealt with a lot without meds in past    Feels mom supportive but never got along with sister's father who lives in the house- both uncomfortable so unsure how long she will live there  Considered going to [place] to be with  but feels can;t  and go with cases open  Last weekend was baby shower - fun but damper due to stress, not sleeping well or eating well - gestational diabetes - checking blood sugars but no meds - controlled with diet  Having a girl - naming Julia Gonzáles - due date is Nov 8th  Weekly OB visits for nonstress test, growth and fluid check  Would like to travel for month to [place] in December  Relationship with  strained- when traveling to see him in June - checked phone and found out he's been unfaithful with multiple people and one more specific person that's very painful - his reaction was poor to her finding out   has 7yo daughter        HPI    Review of Systems      Objective:     Physical Exam  oriented, engaged, sad/calm, full range affect, good eye contact, appropriate speech, denies suicidal/homicidal ideations, fair insight/judgement

## 2019-10-03 ENCOUNTER — OFFICE VISIT (OUTPATIENT)
Dept: PHYSICAL THERAPY | Facility: CLINIC | Age: 24
End: 2019-10-03
Payer: COMMERCIAL

## 2019-10-03 ENCOUNTER — ROUTINE PRENATAL (OUTPATIENT)
Dept: OBGYN CLINIC | Facility: CLINIC | Age: 24
End: 2019-10-03
Payer: COMMERCIAL

## 2019-10-03 VITALS
HEIGHT: 59 IN | DIASTOLIC BLOOD PRESSURE: 50 MMHG | BODY MASS INDEX: 38.95 KG/M2 | WEIGHT: 193.2 LBS | SYSTOLIC BLOOD PRESSURE: 108 MMHG | HEART RATE: 98 BPM

## 2019-10-03 DIAGNOSIS — Z3A.34 34 WEEKS GESTATION OF PREGNANCY: Primary | ICD-10-CM

## 2019-10-03 DIAGNOSIS — M54.9 UPPER BACK PAIN: Primary | ICD-10-CM

## 2019-10-03 DIAGNOSIS — O24.410 DIET CONTROLLED GESTATIONAL DIABETES MELLITUS (GDM) IN THIRD TRIMESTER: ICD-10-CM

## 2019-10-03 PROCEDURE — 97140 MANUAL THERAPY 1/> REGIONS: CPT

## 2019-10-03 PROCEDURE — 99213 OFFICE O/P EST LOW 20 MIN: CPT | Performed by: OBSTETRICS & GYNECOLOGY

## 2019-10-03 PROCEDURE — 76818 FETAL BIOPHYS PROFILE W/NST: CPT | Performed by: OBSTETRICS & GYNECOLOGY

## 2019-10-03 PROCEDURE — 97110 THERAPEUTIC EXERCISES: CPT

## 2019-10-03 NOTE — PROGRESS NOTES
Daily Note     Today's date: 10/3/2019  Patient name: Buffy Ewing  : 1995  MRN: [id number]  Referring provider: Avery Andrade MD  Dx:   Encounter Diagnosis     ICD-10-CM    1  Upper back pain M54 9                   Subjective: Pt presents to PT reporting pain in mid to low back grading the pain a 3/10  She states no pain OOB but states pain has increased as day is progressing  She also states sitting in a comfortable chair at [facility] MD this morning  Objective: See treatment diary below      Assessment: Pt demonstrates fair to good tolerance to TE  Muscle restrictions remain in mid to low back  She reports increased tightness in L medial scapula to L paraspinals  Patient demonstrated fatigue post treatment, exhibited good technique with therapeutic exercises and would benefit from continued PT  Plan: Continue per plan of care        Precautions: 3nd trimester (25 weeks at IE, due 19), migraines, depression      Manual  9/10 9/12 9/17 9/20 9/23 9/30 10/3      BP Pre-tx  110/74 mmHg 101/60 mmHg 105/72 mmHg 104/76 mmHg 108/78 mmHg 102/62 mmHg      STM to lumbar PSM in supported lumbar flexion  ED PK PK Held today PK PK                                                 Exercise Diary  9/10 9/12 9/17 9/20 9/23 9/30 10/3      Rolling under  reviewed           Chin tucks 5" x 10 pball nv 5" x 10 pball nv 5" x 10 pball 5" x 10 pball 5" x 10 pball      scap retraction 15 c 5" nv 15 c 5" pball nv 15 c 5" pball  15 c 5" pball      UBE retro 5' dc -- -- -- -- --      TB shoulder ext  dc -- -- -- -- --      TB low rows  dc -- -- -- -- --      Wall sags 5" x 10 nv  nv         APT/PPT pball  2 x 10 nv pball  2 x 10 pball  2 x 10 pball  2 x 10  pball  2 x 10      TA iso  10x5"           TA + ball squeeze pball  15 c 5" nv   pball  15 c 5"        TA + hip TB ab pball  GTB  5" x 15 nv   pball  GTB  5" x 15        TA + TB multifidus push/pull GTB  15 ea nv  GTB  15 ea GTB  15 ea        TA + heel slide dc --          L Gastroc stretch     30" x 3        QL stretch seated  seatedladder nv  seatedladder 10x seatedladder 10x  seatedladder 10x      UE wall slides: with white bolster 10 nv           Lumbar flexion 3 way  nv 10" x 5, flex  10" x 5, flex  10" x 5, flex  10" x 5, flex  10" x 5, flex      Seated piriformis stretch  nv 15" x 5 ea 15" x 5 ea 15" x 5 ea 15" x 5 ea 15" x 5 ea      Rhomboid stretch @ HR  nv 15" x 5 15" x 5 15" x 5 20" x 4 20" x 4      Doorway pec stretch  nv 15" x 5 15" x 5 15" x 5 20" x 4 20" x 4          Modalities  9/10 9/12 9/17 9/20 9/23 9/30 10/3      MH w/ extra towels Prn np np np np np np np

## 2019-10-03 NOTE — PATIENT INSTRUCTIONS
Patient is here today for her routine 34 week obstetrical visit     She states that she is feeling well and has no complaints at this time  Baby is moving well  She denies any bleeding or loss of fluid or any abdominal pain        She had a level 2 scan at the  center recently which was within normal limits     Blood pressure is normal today and urine screens are all negative     I encouraged her to continue to eat well and also to take adequate amounts of fluid on a daily basis     She is taking her prenatal vitamins daily    Biophysical profile was reassuring in the ZAKI was 18 92     All questions were answered for her      We will see her back in 1 weeks for routine prenatal visit     She was told to call should any problems or concerns arise during the interim

## 2019-10-03 NOTE — PROGRESS NOTES
Assessment     Pregnancy 34 and 6/7 weeks     Plan     28-week labs reviewed, abnormal  Sugar screen  Consent signed  Follow up in 1 Week  Patient is here today for her routine 34 week obstetrical visit     She states that she is feeling well and has no complaints at this time  Baby is moving well  She denies any bleeding or loss of fluid or any abdominal pain  She had a level 2 scan at the  center recently which was within normal limits     Blood pressure is normal today and urine screens are all negative     I encouraged her to continue to eat well and also to take adequate amounts of fluid on a daily basis     She is taking her prenatal vitamins daily    Biophysical profile was reassuring and the ZAKI was 18 92     All questions were answered for her      We will see her back in 1 weeks for routine prenatal visit     She was told to call should any problems or concerns arise during the interim      Subjective     Nando Velazquez is a 21 y o  female being seen today for her obstetrical visit  She is at 34w6d gestation  Patient reports backache, no bleeding, no leaking and occasional contractions  Fetal movement: normal     Menstrual History:  OB History        1    Para        Term                AB        Living           SAB        TAB        Ectopic        Multiple        Live Births                    Menarche age:   Patient's last menstrual period was 2018  The following portions of the patient's history were reviewed and updated as appropriate: allergies, current medications, past family history, past medical history, past social history, past surgical history and problem list     Review of Systems  Pertinent items are noted in HPI       Objective     /50   Pulse 98   Ht 4' 11" (1 499 m)   Wt 87 6 kg (193 lb 3 2 oz)   LMP 2018   BMI 39 02 kg/m²   FHT:  150 BPM   Uterine Size: size equals dates   Presentation: cephalic

## 2019-10-07 ENCOUNTER — OFFICE VISIT (OUTPATIENT)
Dept: PHYSICAL THERAPY | Facility: CLINIC | Age: 24
End: 2019-10-07
Payer: COMMERCIAL

## 2019-10-07 DIAGNOSIS — M54.9 UPPER BACK PAIN: Primary | ICD-10-CM

## 2019-10-07 PROCEDURE — 97110 THERAPEUTIC EXERCISES: CPT

## 2019-10-07 NOTE — PROGRESS NOTES
Daily Note     Today's date: 10/7/2019  Patient name: Queenie Smallwood  : 1995  MRN: [id number]  Referring provider: Marlys Robbins MD  Dx:   Encounter Diagnosis     ICD-10-CM    1  Upper back pain M54 9                   Subjective: Pt arrived about 10 mins late  Pt denies pain at this time stating "I just got up"  Pt denies increased pain post PT session  Objective: See treatment diary below      Assessment: Tolerated treatment well however demonstrates increased difficulty secondary to progressing pregnancy  Held manual therapy today; assess NV and resume if needed  Patient demonstrated fatigue post treatment, exhibited good technique with therapeutic exercises and would benefit from continued PT  Plan: Continue per plan of care        Precautions: 3nd trimester (25 weeks at IE, due 19), migraines, depression      Manual  9/10 9/12 9/17 9/20 9/23 9/30 10/3 10/7     BP Pre-tx  110/74 mmHg 101/60 mmHg 105/72 mmHg 104/76 mmHg 108/78 mmHg 102/62 mmHg 108/68 mmHg     STM to lumbar PSM in supported lumbar flexion  ED PK PK Held today PK  Hold this visit                                                Exercise Diary  9/10 9/12 9/17 9/20 9/23 9/30 10/3 10/7     Rolling under  reviewed           Chin tucks 5" x 10 pball nv 5" x 10 pball nv 5" x 10 pball 5" x 10 pball 5" x 10 pball 5" x 15 pball     scap retraction 15 c 5" nv 15 c 5" pball nv 15 c 5" pball  15 c 5" pball 15 c 5" pball     UBE retro 5' dc -- -- -- -- -- --     TB shoulder ext  dc -- -- -- -- -- --     TB low rows  dc -- -- -- -- -- --     Wall sags 5" x 10 nv  nv         APT/PPT pball  2 x 10 nv pball  2 x 10 pball  2 x 10 pball  2 x 10  pball  2 x 10 pball  2 x 10     TA iso  10x5"           TA + ball squeeze pball  15 c 5" nv   pball  15 c 5"        TA + hip TB ab pball  GTB  5" x 15 nv   pball  GTB  5" x 15        TA + TB multifidus push/pull GTB  15 ea nv  GTB  15 ea GTB  15 ea   GTB  15 ea     TA + heel slide  dc -- L Gastroc stretch     30" x 3        QL stretch seated  seatedladder nv  seatedladder 10x seatedladder 10x  seatedladder 10x seatedladder 10x     UE wall slides: with white bolster 10 nv           Lumbar flexion 3 way  nv 10" x 5, flex  10" x 5, flex  10" x 5, flex  10" x 5, flex  10" x 5, flex 10" x 5, flex     Seated piriformis stretch  nv 15" x 5 ea 15" x 5 ea 15" x 5 ea 15" x 5 ea 15" x 5 ea 15" x 5 ea     Rhomboid stretch @ HR  nv 15" x 5 15" x 5 15" x 5 20" x 4 20" x 4 20" x 4     Doorway pec stretch  nv 15" x 5 15" x 5 15" x 5 20" x 4 20" x 4 20" x 4         Modalities  9/10 9/12 9/17 9/20 9/23 9/30 10/3 10/7     MH w/ extra towels Prn np np np np np np np np

## 2019-10-11 ENCOUNTER — EVALUATION (OUTPATIENT)
Dept: PHYSICAL THERAPY | Facility: CLINIC | Age: 24
End: 2019-10-11
Payer: COMMERCIAL

## 2019-10-11 ENCOUNTER — ROUTINE PRENATAL (OUTPATIENT)
Dept: OBGYN CLINIC | Facility: CLINIC | Age: 24
End: 2019-10-11
Payer: COMMERCIAL

## 2019-10-11 ENCOUNTER — DOCUMENTATION (OUTPATIENT)
Dept: PERINATAL CARE | Facility: CLINIC | Age: 24
End: 2019-10-11

## 2019-10-11 VITALS
SYSTOLIC BLOOD PRESSURE: 104 MMHG | BODY MASS INDEX: 39.14 KG/M2 | DIASTOLIC BLOOD PRESSURE: 62 MMHG | WEIGHT: 193.8 LBS | HEART RATE: 102 BPM

## 2019-10-11 DIAGNOSIS — Z3A.33 33 WEEKS GESTATION OF PREGNANCY: ICD-10-CM

## 2019-10-11 DIAGNOSIS — M54.9 UPPER BACK PAIN: Primary | ICD-10-CM

## 2019-10-11 DIAGNOSIS — Z36.85 ANTENATAL SCREENING FOR STREPTOCOCCUS B: Primary | ICD-10-CM

## 2019-10-11 DIAGNOSIS — O24.410 DIET CONTROLLED GESTATIONAL DIABETES MELLITUS (GDM) IN THIRD TRIMESTER: ICD-10-CM

## 2019-10-11 PROCEDURE — 99213 OFFICE O/P EST LOW 20 MIN: CPT | Performed by: OBSTETRICS & GYNECOLOGY

## 2019-10-11 PROCEDURE — 97140 MANUAL THERAPY 1/> REGIONS: CPT

## 2019-10-11 PROCEDURE — 97110 THERAPEUTIC EXERCISES: CPT | Performed by: PHYSICAL THERAPIST

## 2019-10-11 PROCEDURE — 87653 STREP B DNA AMP PROBE: CPT | Performed by: OBSTETRICS & GYNECOLOGY

## 2019-10-11 PROCEDURE — 76818 FETAL BIOPHYS PROFILE W/NST: CPT | Performed by: OBSTETRICS & GYNECOLOGY

## 2019-10-11 NOTE — PROGRESS NOTES
Date:  10/11/19  RE: Padmini Contreras    : 1995  NERY: Estimated Date of Delivery: 19  EGA: 34w0d    Diet controlled GDM third trimester            Current regimen:  1800 calorie GDM meal plan including 3 meals and 3 snacks daily with protein at each  SMBG 4 times per day, fasting and 2 hours after the start of each meal via Accu-chek Guide meter  Plan:  Fasting and PP numbers WDL of values provided in blood sugar log book  Pt with some undocumented values which she notes is due to taking naps or falling asleep right after dinner meals  Instructed pt if still awake at 1 hr PP time frame to take a 1 hr PP test as able and indicate when doing this in log book  Continue current regimen     () US: Fetal Growth and ZAKI normal    Date due to report next:  Thursday, 10/17/19     Corrinne Bushman RD, LDN  Diabetes Educator   [facility]

## 2019-10-11 NOTE — PROGRESS NOTES
Daily Note & Discharge     Today's date: 10/11/2019  Patient name: Samia Back  : 1995  MRN: [id number]  Referring provider: Sherwin Scales MD  Dx:   Encounter Diagnosis     ICD-10-CM    1  Upper back pain M54 9                   Subjective: Pt states she is having most issues with standing for long periods of time  She is also reporting that she is just having LBP that goes around the stomach into the groin and would like to switch up her current exercise plan  Objective: See treatment diary below, supervised by PK PTA supervise 2:      Assessment: Tolerated treatment well  Patient exhibited good technique with therapeutic exercises, with comfort adding in new interventions today  Plan: Continue per plan of care        Precautions: 3nd trimester (25 weeks at IE, due 19), migraines, depression      Manual  9/10 9/12 9/17 9/20 9/23 9/30 10/3 10/7 10/10   BP Pre-tx  110/74 mmHg 101/60 mmHg 105/72 mmHg 104/76 mmHg 108/78 mmHg 102/62 mmHg 108/68 mmHg 110/74 mmHg   STM to lumbar PSM in supported lumbar flexion  ED PK PK Held today PK  Hold this visit PK                                            Exercise Diary  9/10 9/12 9/17 9/20 9/23 9/30 10/3 10/7    Rolling under  reviewed          Chin tucks 5" x 10 pball nv 5" x 10 pball nv 5" x 10 pball 5" x 10 pball 5" x 10 pball 5" x 15 pball DC   scap retraction 15 c 5" nv 15 c 5" pball nv 15 c 5" pball  15 c 5" pball 15 c 5" pball 15x5"    Neck flexion Stretch         10"x10   Hamstring stretch seated         10"x10    TB low rows  dc -- -- -- -- -- --    Wall sags 5" x 10 nv  nv        APT/PPT pball  2 x 10 nv pball  2 x 10 pball  2 x 10 pball  2 x 10  pball  2 x 10 pball  2 x 10 pball PPT 2x10   TA iso  10x5"          TA + ball squeeze pball  15 c 5" nv   pball  15 c 5"       TA + hip TB ab pball  GTB  5" x 15 nv   pball  GTB  5" x 15       TA + TB multifidus push/pull GTB  15 ea nv  GTB  15 ea GTB  15 ea   GTB  15 ea    TA + heel slide dc --         L Gastroc stretch     30" x 3       QL stretch seated  seatedladder nv  seatedladder 10x seatedladder 10x  seatedladder 10x seatedladder 10x 10x seated UE cross body   UE wall slides: with white bolster 10 nv          Lumbar flexion 3 way  nv 10" x 5, flex  10" x 5, flex  10" x 5, flex  10" x 5, flex  10" x 5, flex 10" x 5, flex Flex 10x5"   Seated piriformis stretch  nv 15" x 5 ea 15" x 5 ea 15" x 5 ea 15" x 5 ea 15" x 5 ea 15" x 5 ea HOLD   Rhomboid stretch @ HR  nv 15" x 5 15" x 5 15" x 5 20" x 4 20" x 4 20" x 4 20"x4   Doorway pec stretch  nv 15" x 5 15" x 5 15" x 5 20" x 4 20" x 4 20" x 4 HOLD       Modalities  9/10 9/12 9/17 9/20 9/23 9/30 10/3 10/7     MH w/ extra towels Prn np np np np np np np np                                     Pt will be D/Cd post session as she is (I) with interventions was given new HEP and is unable to make it to every apt 2/2 pregnancy and pain

## 2019-10-14 ENCOUNTER — APPOINTMENT (OUTPATIENT)
Dept: PHYSICAL THERAPY | Facility: CLINIC | Age: 24
End: 2019-10-14
Payer: COMMERCIAL

## 2019-10-14 LAB — GP B STREP DNA SPEC QL NAA+PROBE: NORMAL

## 2019-10-15 ENCOUNTER — ROUTINE PRENATAL (OUTPATIENT)
Dept: OBGYN CLINIC | Facility: CLINIC | Age: 24
End: 2019-10-15
Payer: COMMERCIAL

## 2019-10-15 VITALS
SYSTOLIC BLOOD PRESSURE: 118 MMHG | BODY MASS INDEX: 39.67 KG/M2 | WEIGHT: 196.4 LBS | HEART RATE: 92 BPM | DIASTOLIC BLOOD PRESSURE: 74 MMHG

## 2019-10-15 DIAGNOSIS — Z34.03 ENCOUNTER FOR SUPERVISION OF NORMAL FIRST PREGNANCY IN THIRD TRIMESTER: Primary | ICD-10-CM

## 2019-10-15 DIAGNOSIS — O24.410 DIET CONTROLLED GESTATIONAL DIABETES MELLITUS (GDM) IN THIRD TRIMESTER: ICD-10-CM

## 2019-10-15 PROBLEM — Z3A.33 33 WEEKS GESTATION OF PREGNANCY: Status: RESOLVED | Noted: 2019-08-28 | Resolved: 2019-10-15

## 2019-10-15 PROCEDURE — 99213 OFFICE O/P EST LOW 20 MIN: CPT | Performed by: PHYSICIAN ASSISTANT

## 2019-10-15 PROCEDURE — 76818 FETAL BIOPHYS PROFILE W/NST: CPT | Performed by: PHYSICIAN ASSISTANT

## 2019-10-15 NOTE — PATIENT INSTRUCTIONS
Stressed kick counts  Improve adherence to diabetic diet; continue to monitor blood sugars  F/u with [facility] as planned  F/u with PT as planned  Labor precautions discussed

## 2019-10-18 ENCOUNTER — ROUTINE PRENATAL (OUTPATIENT)
Dept: OBGYN CLINIC | Facility: CLINIC | Age: 24
End: 2019-10-18
Payer: COMMERCIAL

## 2019-10-18 VITALS
BODY MASS INDEX: 39.37 KG/M2 | HEIGHT: 59 IN | SYSTOLIC BLOOD PRESSURE: 112 MMHG | HEART RATE: 97 BPM | WEIGHT: 195.3 LBS | DIASTOLIC BLOOD PRESSURE: 58 MMHG

## 2019-10-18 DIAGNOSIS — Z34.03 ENCOUNTER FOR SUPERVISION OF NORMAL FIRST PREGNANCY IN THIRD TRIMESTER: Primary | ICD-10-CM

## 2019-10-18 DIAGNOSIS — O24.410 DIET CONTROLLED GESTATIONAL DIABETES MELLITUS (GDM) IN THIRD TRIMESTER: ICD-10-CM

## 2019-10-18 PROCEDURE — 99213 OFFICE O/P EST LOW 20 MIN: CPT | Performed by: PHYSICIAN ASSISTANT

## 2019-10-18 PROCEDURE — 76818 FETAL BIOPHYS PROFILE W/NST: CPT | Performed by: PHYSICIAN ASSISTANT

## 2019-10-18 NOTE — PATIENT INSTRUCTIONS
Continue to monitor blood sugars  F/u with [facility] as planned  Stressed kick counts  Discussed labor precautions

## 2019-10-18 NOTE — PROGRESS NOTES
Assessment     Pregnancy 37 and 0/7 weeks     Plan     Plans for delivery: Vaginal anticipated  Beta strep culture: results reviewed  Counseling: L&D discussion: symptoms of labor and discussed when to call  Fetal testing: performed  Follow up in twice weekly  Continue to monitor blood sugars  F/u with [facility] as planned  Stressed justine reyna  Aranza Brooke is a 21 y o  female being seen today for her obstetrical visit  She is at 37w0d gestation  Patient reports headache, no bleeding, no contractions, no cramping and no leaking  Fetal movement: normal   Patient doing well overall  Complains of HA and increasing vaginal pressure  Post-prandial blood sugars yesterday were less than 120; fasting this morning was 86  Submits readings to diabetic counselor today  Has growth scan next week  NST today reactive  ZAKI 23 71; BPP 10/10  Denies n/v, reflux, abdominal pain, and swelling  Menstrual History:  OB History        1    Para        Term                AB        Living           SAB        TAB        Ectopic        Multiple        Live Births               Obstetric Comments   A1GDM            Menarche age: N/A  Patient's last menstrual period was 2018  The following portions of the patient's history were reviewed and updated as appropriate: allergies, current medications, past family history, past medical history, past social history, past surgical history and problem list     Review of Systems  Pertinent items are noted in HPI       Objective     /58   Pulse 97   Ht 4' 11" (1 499 m)   Wt 88 6 kg (195 lb 4 8 oz)   LMP 2018   BMI 39 45 kg/m²   FHT: 141 BPM   Uterine Size: size equals dates   Presentations: cephalic   Pelvic Exam:     Dilation: 1cm    Effacement: 60%    Station:  -2    Consistency: soft    Position: middle

## 2019-10-21 ENCOUNTER — ULTRASOUND (OUTPATIENT)
Dept: OBGYN CLINIC | Facility: CLINIC | Age: 24
End: 2019-10-21
Payer: COMMERCIAL

## 2019-10-21 ENCOUNTER — APPOINTMENT (OUTPATIENT)
Dept: PHYSICAL THERAPY | Facility: CLINIC | Age: 24
End: 2019-10-21
Payer: COMMERCIAL

## 2019-10-21 DIAGNOSIS — O36.63X0 ENCOUNTER FOR MATERNAL CARE FOR EXCESSIVE FETAL GROWTH IN THIRD TRIMESTER, SINGLE OR UNSPECIFIED FETUS: Primary | ICD-10-CM

## 2019-10-21 PROCEDURE — 76816 OB US FOLLOW-UP PER FETUS: CPT | Performed by: OBSTETRICS & GYNECOLOGY

## 2019-10-21 NOTE — PROGRESS NOTES
SV WFG=192 BPM @ 37w3d  VERTEX  Ant Mid II Plac    AGA=37w1d    BPD=9 28cm=39q7g=44%  HC=32 64cm=86l2x=81 7%  AC=33 74cm=34g1z=51 5%  FL=7 05cm=46u3k=45 4%    ZAKI=27 8cm=POLYHYDRAMNIOS  [id number]

## 2019-10-23 ENCOUNTER — ULTRASOUND (OUTPATIENT)
Dept: PERINATAL CARE | Facility: CLINIC | Age: 24
End: 2019-10-23
Payer: COMMERCIAL

## 2019-10-23 VITALS
SYSTOLIC BLOOD PRESSURE: 95 MMHG | DIASTOLIC BLOOD PRESSURE: 64 MMHG | HEIGHT: 59 IN | HEART RATE: 101 BPM | WEIGHT: 196.6 LBS | BODY MASS INDEX: 39.64 KG/M2

## 2019-10-23 DIAGNOSIS — Z23 ENCOUNTER FOR VACCINATION: ICD-10-CM

## 2019-10-23 DIAGNOSIS — Z36.89 ENCOUNTER FOR ULTRASOUND TO CHECK FETAL GROWTH: ICD-10-CM

## 2019-10-23 DIAGNOSIS — O24.410 DIET CONTROLLED GESTATIONAL DIABETES MELLITUS (GDM) IN THIRD TRIMESTER: Primary | ICD-10-CM

## 2019-10-23 PROCEDURE — 76816 OB US FOLLOW-UP PER FETUS: CPT | Performed by: OBSTETRICS & GYNECOLOGY

## 2019-10-23 PROCEDURE — 96372 THER/PROPH/DIAG INJ SC/IM: CPT | Performed by: OBSTETRICS & GYNECOLOGY

## 2019-10-23 PROCEDURE — 99212 OFFICE O/P EST SF 10 MIN: CPT | Performed by: OBSTETRICS & GYNECOLOGY

## 2019-10-23 NOTE — PROGRESS NOTES
[address]: Ms Tiffanie Charlton was seen today at 37w5d for fetal growth assessment ultrasound  See ultrasound report under "OB Procedures" tab  Please don't hesitate to contact our office with any concerns or questions    Waldron Dubin, MD

## 2019-10-23 NOTE — PATIENT INSTRUCTIONS
Thank you for choosing us for your  care today  If you have any questions about your ultrasound or care, please do not hesitate to contact us or your primary obstetrician  Some general instructions for your pregnancy are:     Exercise: we encourage most pregnant women to get regular physical activity in pregnancy  Exercise has been shown to reduce the risk of several pregnancy-related complications  Unless instructed otherwise you can aim for 22 minutes per day (150 minutes per week! )   Nutrition: aim for calcium-rich and iron-rich foods as well as healthy sources of protein sources   Weight: ask your doctor what is the appropriate amount of weight for you to gain in pregnancy  We follow [place] [employer] Guidelines for recommendations  We have nutritionists here if you would like to meet with them   Protection from influenza: get yourself and your entire household vaccinated against influenza  Tell your partner to get vaccinated as well  Good hand hygiene can reduce the spread of this potentially deadly virus  Insist that everyone who is going to hold or be around your baby get vaccinated   Educate yourself about preeclampsia: preeclampsia is a common, serious complication in pregnancy  A blood pressure of 140mmHg (top number or systolic) OR 97HIET (bottom number or diastolic) is elevated and needs evaluation by your doctor  Ask your doctor early in pregnancy if you should take aspirin (not motrin or tylenol) to prevent preeclampsia  If you were advised to take aspirin to prevent preeclampsia, a daily dose of 162mg or 81mg is advised  One resource is www  preeclampsia org    If you smoke, try to reduce how many cigarettes you smoke  Do not vape       Other warning signs to watch out for in pregnancy or postpartum: chest pain, obstructed breathing or shortness of breath, seizures, thoughts of hurting yourself or your baby, bleeding, a painful or swollen leg, fever, or headache ([facility] POST-BIRTH Warning Signs campaign)  If these happen call [phone]

## 2019-10-24 ENCOUNTER — APPOINTMENT (OUTPATIENT)
Dept: PHYSICAL THERAPY | Facility: CLINIC | Age: 24
End: 2019-10-24
Payer: COMMERCIAL

## 2019-10-25 ENCOUNTER — ROUTINE PRENATAL (OUTPATIENT)
Dept: OBGYN CLINIC | Facility: CLINIC | Age: 24
End: 2019-10-25
Payer: COMMERCIAL

## 2019-10-25 VITALS
WEIGHT: 198.2 LBS | DIASTOLIC BLOOD PRESSURE: 70 MMHG | HEART RATE: 102 BPM | BODY MASS INDEX: 39.96 KG/M2 | HEIGHT: 59 IN | SYSTOLIC BLOOD PRESSURE: 120 MMHG

## 2019-10-25 DIAGNOSIS — O24.410 DIET CONTROLLED GESTATIONAL DIABETES MELLITUS (GDM) IN THIRD TRIMESTER: ICD-10-CM

## 2019-10-25 DIAGNOSIS — Z34.03 ENCOUNTER FOR SUPERVISION OF NORMAL FIRST PREGNANCY IN THIRD TRIMESTER: Primary | ICD-10-CM

## 2019-10-25 DIAGNOSIS — O40.3XX0 POLYHYDRAMNIOS IN THIRD TRIMESTER COMPLICATION, SINGLE OR UNSPECIFIED FETUS: ICD-10-CM

## 2019-10-25 PROCEDURE — 99213 OFFICE O/P EST LOW 20 MIN: CPT | Performed by: PHYSICIAN ASSISTANT

## 2019-10-25 PROCEDURE — 76818 FETAL BIOPHYS PROFILE W/NST: CPT | Performed by: PHYSICIAN ASSISTANT

## 2019-10-25 NOTE — PROGRESS NOTES
Assessment     Pregnancy 38 and 0/7 weeks     Plan     Plans for delivery: Vaginal anticipated  Beta strep culture: results reviewed  Counseling: L&D discussion: symptoms of labor and discussed when to call  Fetal testing: performed  Follow up in twice weekly  Stressed kick counts  Continue to report blood sugar readings  Timur Juan is a 21 y o  female being seen today for her obstetrical visit  She is at 38w0d gestation  Patient reports no bleeding, no contractions, no cramping and no leaking  Fetal movement: normal   Patient doing well overall  Fasting blood sugar this morning was 95; some PP readings have been above 120  Patient had U/S this week that showed polyhydramnios  Complains of itching that goes up her arms  Notes increasing vaginal pressure and intermittent HA   NST reactive; took 60 minutes because of fetal movement away from probe  Recorded maternal pulse multiple times  ZAKI 26 25/BPP 10/10  Denies n/v, abdominal pain, and swelling  Menstrual History:  OB History        1    Para        Term                AB        Living           SAB        TAB        Ectopic        Multiple        Live Births               Obstetric Comments   A1GDM            Menarche age: N/A  Patient's last menstrual period was 2018  The following portions of the patient's history were reviewed and updated as appropriate: allergies, current medications, past family history, past medical history, past social history, past surgical history and problem list     Review of Systems  Pertinent items are noted in HPI       Objective     /70   Pulse 102   Ht 4' 11" (1 499 m)   Wt 89 9 kg (198 lb 3 2 oz)   LMP 2018   BMI 40 03 kg/m²   FHT: 132 BPM   Uterine Size: size equals dates   Presentations: cephalic   Pelvic Exam:     Dilation: 1cm    Effacement: 50%    Station:  -2    Consistency: soft    Position: middle

## 2019-10-29 ENCOUNTER — ROUTINE PRENATAL (OUTPATIENT)
Dept: OBGYN CLINIC | Facility: CLINIC | Age: 24
End: 2019-10-29
Payer: COMMERCIAL

## 2019-10-29 VITALS
DIASTOLIC BLOOD PRESSURE: 74 MMHG | BODY MASS INDEX: 39.59 KG/M2 | WEIGHT: 196 LBS | HEART RATE: 94 BPM | SYSTOLIC BLOOD PRESSURE: 116 MMHG

## 2019-10-29 DIAGNOSIS — O24.410 DIET CONTROLLED GESTATIONAL DIABETES MELLITUS (GDM) IN THIRD TRIMESTER: ICD-10-CM

## 2019-10-29 DIAGNOSIS — O40.3XX0 POLYHYDRAMNIOS IN THIRD TRIMESTER COMPLICATION, SINGLE OR UNSPECIFIED FETUS: ICD-10-CM

## 2019-10-29 DIAGNOSIS — Z3A.38 38 WEEKS GESTATION OF PREGNANCY: Primary | ICD-10-CM

## 2019-10-29 PROCEDURE — 99213 OFFICE O/P EST LOW 20 MIN: CPT | Performed by: OBSTETRICS & GYNECOLOGY

## 2019-10-29 PROCEDURE — 76818 FETAL BIOPHYS PROFILE W/NST: CPT | Performed by: OBSTETRICS & GYNECOLOGY

## 2019-10-30 NOTE — PROGRESS NOTES
Assessment     Pregnancy 38 and 4/7 weeks     Plan     Plans for delivery: Vaginal anticipated  Beta strep culture: done  Counseling: Consent signed  Fetal testing: discussed  Follow up in 1 Week  Patient is here today for her routine 38 week obstetrical visit     She states that she is feeling well and has no complaints at this time  Baby is moving well  She denies any bleeding or loss of fluid or any abdominal pain  She had a level 2 scan at the  center recently which was within normal limits     Blood pressure is normal today and urine screens are all negative     I encouraged her to continue to eat well and also to take adequate amounts of fluid on a daily basis     She is taking her prenatal vitamins daily     testing was reassuring today  ZAKI was 20 06     All questions were answered for her      We will see her back in 1 weeks for routine prenatal visit     She was told to call should any problems or concerns arise during the interim      Kevin Chun is a 25 y o  female being seen today for her obstetrical visit  She is at 38w5d gestation  Patient reports backache, fatigue, no bleeding, no leaking and occasional contractions  Fetal movement: normal     Menstrual History:  OB History        1    Para        Term                AB        Living           SAB        TAB        Ectopic        Multiple        Live Births               Obstetric Comments   A1GDM            Menarche age:   Patient's last menstrual period was 2018  The following portions of the patient's history were reviewed and updated as appropriate: allergies, current medications, past family history, past medical history, past social history, past surgical history and problem list     Review of Systems  Pertinent items are noted in HPI       Objective     LMP 2018   FHT: 136 BPM   Uterine Size: size equals dates   Presentations: cephalic   Pelvic Exam: Dilation: 1cm    Effacement: 60%    Station:  -2    Consistency: medium    Position: middle

## 2019-10-30 NOTE — PATIENT INSTRUCTIONS
Patient is here today for her routine 38 week obstetrical visit     She states that she is feeling well and has no complaints at this time  Baby is moving well  She denies any bleeding or loss of fluid or any abdominal pain  She had a level 2 scan at the  center recently which was within normal limits     Blood pressure is normal today and urine screens are all negative     I encouraged her to continue to eat well and also to take adequate amounts of fluid on a daily basis     She is taking her prenatal vitamins daily     testing was reassuring today    ZAKI was 20 06     All questions were answered for her      We will see her back in 1 weeks for routine prenatal visit     She was told to call should any problems or concerns arise during the interim

## 2019-10-31 NOTE — PROGRESS NOTES
testing visit    Biophysical profile showed a result of 10/10    Reactive nonstress test with good beat to beat variability    Amniotic fluid index of 16

## 2019-11-01 ENCOUNTER — ROUTINE PRENATAL (OUTPATIENT)
Dept: OBGYN CLINIC | Facility: CLINIC | Age: 24
End: 2019-11-01
Payer: COMMERCIAL

## 2019-11-01 VITALS — BODY MASS INDEX: 39.99 KG/M2 | SYSTOLIC BLOOD PRESSURE: 110 MMHG | DIASTOLIC BLOOD PRESSURE: 64 MMHG | WEIGHT: 198 LBS

## 2019-11-01 DIAGNOSIS — O24.410 DIET CONTROLLED GESTATIONAL DIABETES MELLITUS (GDM) IN THIRD TRIMESTER: ICD-10-CM

## 2019-11-01 DIAGNOSIS — O40.3XX0 POLYHYDRAMNIOS IN THIRD TRIMESTER COMPLICATION, SINGLE OR UNSPECIFIED FETUS: ICD-10-CM

## 2019-11-01 DIAGNOSIS — Z34.03 ENCOUNTER FOR SUPERVISION OF NORMAL FIRST PREGNANCY IN THIRD TRIMESTER: Primary | ICD-10-CM

## 2019-11-01 PROCEDURE — 99213 OFFICE O/P EST LOW 20 MIN: CPT | Performed by: PHYSICIAN ASSISTANT

## 2019-11-01 PROCEDURE — 76818 FETAL BIOPHYS PROFILE W/NST: CPT | Performed by: PHYSICIAN ASSISTANT

## 2019-11-01 NOTE — PROGRESS NOTES
Assessment     Pregnancy 39 and 0/7 weeks     Plan     Plans for delivery: Vaginal anticipated  Beta strep culture: results reviewed  Counseling: L&D discussion: symptoms of labor and discussed when to call  Fetal testing: performed  Follow up in 1 Week  Stressed kick counts  Induction scheduled for   Kenyon Parson is a 25 y o  female being seen today for her obstetrical visit  She is at 39w0d gestation  Patient reports headache and no bleeding  Fetal movement: normal  Patient states blood sugars are "random"  AM readings the last few days <90, but most recent post prandial were 126 and 117  Currently losing her mucus plug, and "feels wet down there"  Complains of back pain, HA, and increasing pelvic pressure  NST reactive  BPP 10/10; ZAKI 26 48  Mucus discharge seen on vaginal exam; nitrazine negative  Menstrual History:  OB History        1    Para        Term                AB        Living           SAB        TAB        Ectopic        Multiple        Live Births               Obstetric Comments   A1GDM            Menarche age: N/A  Patient's last menstrual period was 2018  The following portions of the patient's history were reviewed and updated as appropriate: allergies, current medications, past family history, past medical history, past social history, past surgical history and problem list     Review of Systems  Pertinent items are noted in HPI       Objective     /64   Wt 89 8 kg (198 lb)   LMP 2018   BMI 39 99 kg/m²   FHT: 159 BPM   Uterine Size: size equals dates   Presentations: cephalic   Pelvic Exam:     Dilation: 3cm    Effacement: 70%    Station:  -1    Consistency: soft    Position: middle

## 2019-11-05 ENCOUNTER — ROUTINE PRENATAL (OUTPATIENT)
Dept: OBGYN CLINIC | Facility: CLINIC | Age: 24
End: 2019-11-05
Payer: COMMERCIAL

## 2019-11-05 VITALS
HEART RATE: 98 BPM | WEIGHT: 199.2 LBS | SYSTOLIC BLOOD PRESSURE: 118 MMHG | HEIGHT: 59 IN | DIASTOLIC BLOOD PRESSURE: 70 MMHG | BODY MASS INDEX: 40.16 KG/M2

## 2019-11-05 DIAGNOSIS — O24.410 DIET CONTROLLED GESTATIONAL DIABETES MELLITUS (GDM) IN THIRD TRIMESTER: ICD-10-CM

## 2019-11-05 PROCEDURE — 99213 OFFICE O/P EST LOW 20 MIN: CPT | Performed by: OBSTETRICS & GYNECOLOGY

## 2019-11-05 PROCEDURE — 76818 FETAL BIOPHYS PROFILE W/NST: CPT | Performed by: OBSTETRICS & GYNECOLOGY

## 2019-11-06 ENCOUNTER — HOSPITAL ENCOUNTER (OUTPATIENT)
Dept: LABOR AND DELIVERY | Facility: HOSPITAL | Age: 24
Discharge: HOME | DRG: 540 | End: 2019-11-06
Payer: COMMERCIAL

## 2019-11-06 ENCOUNTER — HOSPITAL ENCOUNTER (INPATIENT)
Facility: HOSPITAL | Age: 24
LOS: 4 days | Discharge: HOME | DRG: 540 | End: 2019-11-10
Attending: OBSTETRICS & GYNECOLOGY | Admitting: OBSTETRICS & GYNECOLOGY
Payer: COMMERCIAL

## 2019-11-06 DIAGNOSIS — Z98.891 S/P PRIMARY LOW TRANSVERSE C-SECTION: ICD-10-CM

## 2019-11-06 DIAGNOSIS — Z3A.39 39 WEEKS GESTATION OF PREGNANCY: Primary | ICD-10-CM

## 2019-11-06 LAB
ABO GROUP BLD: NORMAL
BLD GP AB SCN SERPL QL: NEGATIVE
ERYTHROCYTE [DISTWIDTH] IN BLOOD BY AUTOMATED COUNT: 14.2 % (ref 11.6–15.1)
GLUCOSE SERPL-MCNC: 111 MG/DL (ref 65–140)
GLUCOSE SERPL-MCNC: 135 MG/DL (ref 65–140)
GLUCOSE SERPL-MCNC: 77 MG/DL (ref 65–140)
GLUCOSE SERPL-MCNC: 92 MG/DL (ref 65–140)
HCT VFR BLD AUTO: 36.9 % (ref 34.8–46.1)
HGB BLD-MCNC: 12.4 G/DL (ref 11.5–15.4)
MCH RBC QN AUTO: 30.7 PG (ref 26.8–34.3)
MCHC RBC AUTO-ENTMCNC: 33.6 G/DL (ref 31.4–37.4)
MCV RBC AUTO: 91 FL (ref 82–98)
PLATELET # BLD AUTO: 310 THOUSANDS/UL (ref 149–390)
PMV BLD AUTO: 9.6 FL (ref 8.9–12.7)
RBC # BLD AUTO: 4.04 MILLION/UL (ref 3.81–5.12)
RH BLD: POSITIVE
SPECIMEN EXPIRATION DATE: NORMAL
WBC # BLD AUTO: 15.97 THOUSAND/UL (ref 4.31–10.16)

## 2019-11-06 PROCEDURE — 3E033VJ INTRODUCTION OF OTHER HORMONE INTO PERIPHERAL VEIN, PERCUTANEOUS APPROACH: ICD-10-PCS | Performed by: OBSTETRICS & GYNECOLOGY

## 2019-11-06 PROCEDURE — 85027 COMPLETE CBC AUTOMATED: CPT | Performed by: OBSTETRICS & GYNECOLOGY

## 2019-11-06 PROCEDURE — 86901 BLOOD TYPING SEROLOGIC RH(D): CPT | Performed by: OBSTETRICS & GYNECOLOGY

## 2019-11-06 PROCEDURE — 86900 BLOOD TYPING SEROLOGIC ABO: CPT | Performed by: OBSTETRICS & GYNECOLOGY

## 2019-11-06 PROCEDURE — 86850 RBC ANTIBODY SCREEN: CPT | Performed by: OBSTETRICS & GYNECOLOGY

## 2019-11-06 PROCEDURE — 86592 SYPHILIS TEST NON-TREP QUAL: CPT | Performed by: OBSTETRICS & GYNECOLOGY

## 2019-11-06 PROCEDURE — 4A1HXCZ MONITORING OF PRODUCTS OF CONCEPTION, CARDIAC RATE, EXTERNAL APPROACH: ICD-10-PCS | Performed by: OBSTETRICS & GYNECOLOGY

## 2019-11-06 PROCEDURE — 82948 REAGENT STRIP/BLOOD GLUCOSE: CPT

## 2019-11-06 PROCEDURE — NC001 PR NO CHARGE: Performed by: OBSTETRICS & GYNECOLOGY

## 2019-11-06 RX ORDER — SODIUM CHLORIDE, SODIUM LACTATE, POTASSIUM CHLORIDE, CALCIUM CHLORIDE 600; 310; 30; 20 MG/100ML; MG/100ML; MG/100ML; MG/100ML
125 INJECTION, SOLUTION INTRAVENOUS CONTINUOUS
Status: DISCONTINUED | OUTPATIENT
Start: 2019-11-06 | End: 2019-11-07

## 2019-11-06 RX ORDER — OXYTOCIN/RINGER'S LACTATE 30/500 ML
1-30 PLASTIC BAG, INJECTION (ML) INTRAVENOUS
Status: DISCONTINUED | OUTPATIENT
Start: 2019-11-06 | End: 2019-11-07

## 2019-11-06 RX ORDER — ONDANSETRON 2 MG/ML
4 INJECTION INTRAMUSCULAR; INTRAVENOUS EVERY 6 HOURS PRN
Status: DISCONTINUED | OUTPATIENT
Start: 2019-11-06 | End: 2019-11-07

## 2019-11-06 RX ADMIN — SODIUM CHLORIDE, SODIUM LACTATE, POTASSIUM CHLORIDE, AND CALCIUM CHLORIDE 125 ML/HR: .6; .31; .03; .02 INJECTION, SOLUTION INTRAVENOUS at 18:09

## 2019-11-06 RX ADMIN — SODIUM CHLORIDE, SODIUM LACTATE, POTASSIUM CHLORIDE, AND CALCIUM CHLORIDE 125 ML/HR: .6; .31; .03; .02 INJECTION, SOLUTION INTRAVENOUS at 22:56

## 2019-11-06 RX ADMIN — Medication 2 MILLI-UNITS/MIN: at 18:26
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
## 2019-11-07 ENCOUNTER — ANESTHESIA (INPATIENT)
Dept: LABOR AND DELIVERY | Facility: HOSPITAL | Age: 24
DRG: 540 | End: 2019-11-07
Payer: COMMERCIAL

## 2019-11-07 PROBLEM — Z98.891 S/P PRIMARY LOW TRANSVERSE C-SECTION: Status: ACTIVE | Noted: 2019-11-07

## 2019-11-07 LAB
BASE EXCESS BLDCOA CALC-SCNC: -0.5 MMOL/L (ref 3–11)
BASE EXCESS BLDCOV CALC-SCNC: -0.4 MMOL/L (ref 1–9)
GLUCOSE SERPL-MCNC: 109 MG/DL (ref 65–140)
GLUCOSE SERPL-MCNC: 84 MG/DL (ref 65–140)
GLUCOSE SERPL-MCNC: 98 MG/DL (ref 65–140)
HCO3 BLDCOA-SCNC: 27.2 MMOL/L (ref 17.3–27.3)
HCO3 BLDCOV-SCNC: 25 MMOL/L (ref 12.2–28.6)
O2 CT VFR BLDCOA CALC: 3.8 ML/DL
OXYHGB MFR BLDCOA: 21.9 %
OXYHGB MFR BLDCOV: 75.6 %
PCO2 BLDCOA: 59.4 MM[HG] (ref 30–60)
PCO2 BLDCOV: 43.9 MM HG (ref 27–43)
PH BLDCOA: 7.28 [PH] (ref 7.23–7.43)
PH BLDCOV: 7.37 [PH] (ref 7.19–7.49)
PO2 BLDCOA: 13.7 MM HG (ref 5–25)
PO2 BLDCOV: 32 MM HG (ref 15–45)
RPR SER QL: NORMAL
SAO2 % BLDCOV: 13 ML/DL

## 2019-11-07 PROCEDURE — 82948 REAGENT STRIP/BLOOD GLUCOSE: CPT

## 2019-11-07 PROCEDURE — 3E0E77Z INTRODUCTION OF ELECTROLYTIC AND WATER BALANCE SUBSTANCE INTO PRODUCTS OF CONCEPTION, VIA NATURAL OR ARTIFICIAL OPENING: ICD-10-PCS | Performed by: OBSTETRICS & GYNECOLOGY

## 2019-11-07 PROCEDURE — 10H07YZ INSERTION OF OTHER DEVICE INTO PRODUCTS OF CONCEPTION, VIA NATURAL OR ARTIFICIAL OPENING: ICD-10-PCS | Performed by: OBSTETRICS & GYNECOLOGY

## 2019-11-07 PROCEDURE — 82805 BLOOD GASES W/O2 SATURATION: CPT | Performed by: OBSTETRICS & GYNECOLOGY

## 2019-11-07 PROCEDURE — 59514 CESAREAN DELIVERY ONLY: CPT | Performed by: OBSTETRICS & GYNECOLOGY

## 2019-11-07 PROCEDURE — 10907ZC DRAINAGE OF AMNIOTIC FLUID, THERAPEUTIC FROM PRODUCTS OF CONCEPTION, VIA NATURAL OR ARTIFICIAL OPENING: ICD-10-PCS | Performed by: OBSTETRICS & GYNECOLOGY

## 2019-11-07 RX ORDER — ONDANSETRON 2 MG/ML
4 INJECTION INTRAMUSCULAR; INTRAVENOUS EVERY 4 HOURS PRN
Status: ACTIVE | OUTPATIENT
Start: 2019-11-07 | End: 2019-11-08

## 2019-11-07 RX ORDER — SODIUM CHLORIDE, SODIUM LACTATE, POTASSIUM CHLORIDE, CALCIUM CHLORIDE 600; 310; 30; 20 MG/100ML; MG/100ML; MG/100ML; MG/100ML
125 INJECTION, SOLUTION INTRAVENOUS CONTINUOUS
Status: DISCONTINUED | OUTPATIENT
Start: 2019-11-07 | End: 2019-11-10

## 2019-11-07 RX ORDER — NALOXONE HYDROCHLORIDE 0.4 MG/ML
0.1 INJECTION, SOLUTION INTRAMUSCULAR; INTRAVENOUS; SUBCUTANEOUS
Status: ACTIVE | OUTPATIENT
Start: 2019-11-07 | End: 2019-11-08

## 2019-11-07 RX ORDER — LABETALOL 20 MG/4 ML (5 MG/ML) INTRAVENOUS SYRINGE
5
Status: DISCONTINUED | OUTPATIENT
Start: 2019-11-07 | End: 2019-11-07

## 2019-11-07 RX ORDER — HYDROMORPHONE HCL/PF 1 MG/ML
0.5 SYRINGE (ML) INJECTION
Status: DISCONTINUED | OUTPATIENT
Start: 2019-11-07 | End: 2019-11-07

## 2019-11-07 RX ORDER — IBUPROFEN 600 MG/1
600 TABLET ORAL EVERY 6 HOURS PRN
Status: DISCONTINUED | OUTPATIENT
Start: 2019-11-08 | End: 2019-11-10 | Stop reason: HOSPADM

## 2019-11-07 RX ORDER — ONDANSETRON 2 MG/ML
4 INJECTION INTRAMUSCULAR; INTRAVENOUS EVERY 8 HOURS PRN
Status: DISCONTINUED | OUTPATIENT
Start: 2019-11-08 | End: 2019-11-10 | Stop reason: HOSPADM

## 2019-11-07 RX ORDER — PROMETHAZINE HYDROCHLORIDE 25 MG/ML
12.5 INJECTION, SOLUTION INTRAMUSCULAR; INTRAVENOUS ONCE AS NEEDED
Status: DISCONTINUED | OUTPATIENT
Start: 2019-11-07 | End: 2019-11-07

## 2019-11-07 RX ORDER — MEPERIDINE HYDROCHLORIDE 25 MG/ML
12.5 INJECTION INTRAMUSCULAR; INTRAVENOUS; SUBCUTANEOUS AS NEEDED
Status: DISCONTINUED | OUTPATIENT
Start: 2019-11-07 | End: 2019-11-07

## 2019-11-07 RX ORDER — BUTORPHANOL TARTRATE 1 MG/ML
1 INJECTION, SOLUTION INTRAMUSCULAR; INTRAVENOUS
Status: DISCONTINUED | OUTPATIENT
Start: 2019-11-07 | End: 2019-11-07

## 2019-11-07 RX ORDER — OXYCODONE HYDROCHLORIDE 10 MG/1
10 TABLET ORAL EVERY 4 HOURS PRN
Status: DISCONTINUED | OUTPATIENT
Start: 2019-11-08 | End: 2019-11-10 | Stop reason: HOSPADM

## 2019-11-07 RX ORDER — DIPHENHYDRAMINE HYDROCHLORIDE 50 MG/ML
25 INJECTION INTRAMUSCULAR; INTRAVENOUS EVERY 6 HOURS PRN
Status: DISPENSED | OUTPATIENT
Start: 2019-11-07 | End: 2019-11-08

## 2019-11-07 RX ORDER — BUPIVACAINE HYDROCHLORIDE 2.5 MG/ML
INJECTION, SOLUTION INFILTRATION; PERINEURAL AS NEEDED
Status: DISCONTINUED | OUTPATIENT
Start: 2019-11-07 | End: 2019-11-07 | Stop reason: SURG

## 2019-11-07 RX ORDER — ONDANSETRON 2 MG/ML
4 INJECTION INTRAMUSCULAR; INTRAVENOUS ONCE AS NEEDED
Status: DISCONTINUED | OUTPATIENT
Start: 2019-11-07 | End: 2019-11-07

## 2019-11-07 RX ORDER — OXYTOCIN/RINGER'S LACTATE 30/500 ML
PLASTIC BAG, INJECTION (ML) INTRAVENOUS CONTINUOUS PRN
Status: DISCONTINUED | OUTPATIENT
Start: 2019-11-07 | End: 2019-11-07 | Stop reason: SURG

## 2019-11-07 RX ORDER — KETOROLAC TROMETHAMINE 30 MG/ML
INJECTION, SOLUTION INTRAMUSCULAR; INTRAVENOUS AS NEEDED
Status: DISCONTINUED | OUTPATIENT
Start: 2019-11-07 | End: 2019-11-07 | Stop reason: SURG

## 2019-11-07 RX ORDER — PROPOFOL 10 MG/ML
INJECTION, EMULSION INTRAVENOUS AS NEEDED
Status: DISCONTINUED | OUTPATIENT
Start: 2019-11-07 | End: 2019-11-07 | Stop reason: SURG

## 2019-11-07 RX ORDER — HYDROMORPHONE HCL/PF 1 MG/ML
1 SYRINGE (ML) INJECTION EVERY 2 HOUR PRN
Status: DISCONTINUED | OUTPATIENT
Start: 2019-11-08 | End: 2019-11-10 | Stop reason: HOSPADM

## 2019-11-07 RX ORDER — FENTANYL CITRATE/PF 50 MCG/ML
25 SYRINGE (ML) INJECTION
Status: DISCONTINUED | OUTPATIENT
Start: 2019-11-07 | End: 2019-11-07

## 2019-11-07 RX ORDER — METOCLOPRAMIDE HYDROCHLORIDE 5 MG/ML
5 INJECTION INTRAMUSCULAR; INTRAVENOUS EVERY 6 HOURS PRN
Status: ACTIVE | OUTPATIENT
Start: 2019-11-07 | End: 2019-11-08

## 2019-11-07 RX ORDER — KETOROLAC TROMETHAMINE 30 MG/ML
15 INJECTION, SOLUTION INTRAMUSCULAR; INTRAVENOUS EVERY 6 HOURS
Status: COMPLETED | OUTPATIENT
Start: 2019-11-07 | End: 2019-11-08

## 2019-11-07 RX ORDER — CALCIUM CARBONATE 200(500)MG
1000 TABLET,CHEWABLE ORAL DAILY PRN
Status: DISCONTINUED | OUTPATIENT
Start: 2019-11-07 | End: 2019-11-10 | Stop reason: HOSPADM

## 2019-11-07 RX ORDER — HYDROMORPHONE HCL/PF 1 MG/ML
0.5 SYRINGE (ML) INJECTION EVERY 2 HOUR PRN
Status: ACTIVE | OUTPATIENT
Start: 2019-11-07 | End: 2019-11-08

## 2019-11-07 RX ORDER — ACETAMINOPHEN 325 MG/1
650 TABLET ORAL EVERY 6 HOURS PRN
Status: DISCONTINUED | OUTPATIENT
Start: 2019-11-08 | End: 2019-11-10 | Stop reason: HOSPADM

## 2019-11-07 RX ORDER — DOCUSATE SODIUM 100 MG/1
100 CAPSULE, LIQUID FILLED ORAL 2 TIMES DAILY
Status: DISCONTINUED | OUTPATIENT
Start: 2019-11-07 | End: 2019-11-10 | Stop reason: HOSPADM

## 2019-11-07 RX ORDER — CEFAZOLIN SODIUM 2 G/50ML
2000 SOLUTION INTRAVENOUS ONCE
Status: COMPLETED | OUTPATIENT
Start: 2019-11-07 | End: 2019-11-07

## 2019-11-07 RX ORDER — MORPHINE SULFATE 0.5 MG/ML
INJECTION, SOLUTION EPIDURAL; INTRATHECAL; INTRAVENOUS AS NEEDED
Status: DISCONTINUED | OUTPATIENT
Start: 2019-11-07 | End: 2019-11-07 | Stop reason: SURG

## 2019-11-07 RX ORDER — LIDOCAINE HYDROCHLORIDE AND EPINEPHRINE 20; 5 MG/ML; UG/ML
INJECTION, SOLUTION EPIDURAL; INFILTRATION; INTRACAUDAL; PERINEURAL AS NEEDED
Status: DISCONTINUED | OUTPATIENT
Start: 2019-11-07 | End: 2019-11-07 | Stop reason: SURG

## 2019-11-07 RX ORDER — DIPHENHYDRAMINE HYDROCHLORIDE 50 MG/ML
25 INJECTION INTRAMUSCULAR; INTRAVENOUS EVERY 6 HOURS PRN
Status: DISCONTINUED | OUTPATIENT
Start: 2019-11-08 | End: 2019-11-10 | Stop reason: HOSPADM

## 2019-11-07 RX ORDER — OXYCODONE HYDROCHLORIDE 5 MG/1
5 TABLET ORAL EVERY 4 HOURS PRN
Status: DISCONTINUED | OUTPATIENT
Start: 2019-11-08 | End: 2019-11-10 | Stop reason: HOSPADM

## 2019-11-07 RX ORDER — LIDOCAINE HYDROCHLORIDE AND EPINEPHRINE 15; 5 MG/ML; UG/ML
INJECTION, SOLUTION EPIDURAL AS NEEDED
Status: DISCONTINUED | OUTPATIENT
Start: 2019-11-07 | End: 2019-11-07 | Stop reason: SURG

## 2019-11-07 RX ORDER — ALBUTEROL SULFATE 2.5 MG/3ML
2.5 SOLUTION RESPIRATORY (INHALATION) ONCE AS NEEDED
Status: DISCONTINUED | OUTPATIENT
Start: 2019-11-07 | End: 2019-11-07

## 2019-11-07 RX ORDER — DEXAMETHASONE SODIUM PHOSPHATE 10 MG/ML
8 INJECTION, SOLUTION INTRAMUSCULAR; INTRAVENOUS ONCE AS NEEDED
Status: DISPENSED | OUTPATIENT
Start: 2019-11-07 | End: 2019-11-08

## 2019-11-07 RX ORDER — SODIUM CHLORIDE, SODIUM LACTATE, POTASSIUM CHLORIDE, CALCIUM CHLORIDE 600; 310; 30; 20 MG/100ML; MG/100ML; MG/100ML; MG/100ML
125 INJECTION, SOLUTION INTRAVENOUS CONTINUOUS
Status: DISCONTINUED | OUTPATIENT
Start: 2019-11-07 | End: 2019-11-07

## 2019-11-07 RX ORDER — OXYTOCIN/RINGER'S LACTATE 30/500 ML
62.5 PLASTIC BAG, INJECTION (ML) INTRAVENOUS CONTINUOUS
Status: DISPENSED | OUTPATIENT
Start: 2019-11-07 | End: 2019-11-08

## 2019-11-07 RX ADMIN — KETOROLAC TROMETHAMINE 15 MG: 30 INJECTION, SOLUTION INTRAMUSCULAR at 22:57

## 2019-11-07 RX ADMIN — KETOROLAC TROMETHAMINE 30 MG: 30 INJECTION, SOLUTION INTRAMUSCULAR at 16:41

## 2019-11-07 RX ADMIN — SODIUM CHLORIDE, SODIUM LACTATE, POTASSIUM CHLORIDE, AND CALCIUM CHLORIDE 500 ML: .6; .31; .03; .02 INJECTION, SOLUTION INTRAVENOUS at 08:50

## 2019-11-07 RX ADMIN — PHENYLEPHRINE HYDROCHLORIDE 100 MCG: 10 INJECTION INTRAVENOUS at 16:03

## 2019-11-07 RX ADMIN — DOCUSATE SODIUM 100 MG: 100 CAPSULE, LIQUID FILLED ORAL at 21:24

## 2019-11-07 RX ADMIN — PHENYLEPHRINE HYDROCHLORIDE 100 MCG: 10 INJECTION INTRAVENOUS at 16:42

## 2019-11-07 RX ADMIN — DIPHENHYDRAMINE HYDROCHLORIDE 25 MG: 50 INJECTION, SOLUTION INTRAMUSCULAR; INTRAVENOUS at 21:26

## 2019-11-07 RX ADMIN — PROPOFOL 40 MG: 10 INJECTION, EMULSION INTRAVENOUS at 16:35

## 2019-11-07 RX ADMIN — PROPOFOL 30 MG: 10 INJECTION, EMULSION INTRAVENOUS at 16:42

## 2019-11-07 RX ADMIN — SODIUM CHLORIDE, SODIUM LACTATE, POTASSIUM CHLORIDE, AND CALCIUM CHLORIDE 125 ML/HR: .6; .31; .03; .02 INJECTION, SOLUTION INTRAVENOUS at 14:21

## 2019-11-07 RX ADMIN — LIDOCAINE HYDROCHLORIDE,EPINEPHRINE BITARTRATE 10 ML: 20; .005 INJECTION, SOLUTION EPIDURAL; INFILTRATION; INTRACAUDAL; PERINEURAL at 16:03

## 2019-11-07 RX ADMIN — PHENYLEPHRINE HYDROCHLORIDE 100 MCG: 10 INJECTION INTRAVENOUS at 16:44

## 2019-11-07 RX ADMIN — LIDOCAINE HYDROCHLORIDE AND EPINEPHRINE 3 ML: 15; 5 INJECTION, SOLUTION EPIDURAL at 08:10

## 2019-11-07 RX ADMIN — PHENYLEPHRINE HYDROCHLORIDE 50 MCG/MIN: 10 INJECTION INTRAVENOUS at 16:03

## 2019-11-07 RX ADMIN — PHENYLEPHRINE HYDROCHLORIDE 100 MCG: 10 INJECTION INTRAVENOUS at 16:05

## 2019-11-07 RX ADMIN — SODIUM CHLORIDE, SODIUM LACTATE, POTASSIUM CHLORIDE, AND CALCIUM CHLORIDE 125 ML/HR: .6; .31; .03; .02 INJECTION, SOLUTION INTRAVENOUS at 07:27

## 2019-11-07 RX ADMIN — ONDANSETRON 4 MG: 2 INJECTION INTRAMUSCULAR; INTRAVENOUS at 11:05

## 2019-11-07 RX ADMIN — BUPIVACAINE HYDROCHLORIDE 1 ML: 2.5 INJECTION, SOLUTION INFILTRATION; PERINEURAL at 08:08

## 2019-11-07 RX ADMIN — Medication 250 MILLI-UNITS/MIN: at 16:23

## 2019-11-07 RX ADMIN — ROPIVACAINE HYDROCHLORIDE: 2 INJECTION, SOLUTION EPIDURAL; INFILTRATION at 08:20

## 2019-11-07 RX ADMIN — PROPOFOL 20 MG: 10 INJECTION, EMULSION INTRAVENOUS at 16:38

## 2019-11-07 RX ADMIN — BUTORPHANOL TARTRATE 1 MG: 1 INJECTION, SOLUTION INTRAMUSCULAR; INTRAVENOUS at 05:45

## 2019-11-07 RX ADMIN — MORPHINE SULFATE 3 MG: 0.5 INJECTION, SOLUTION EPIDURAL; INTRATHECAL; INTRAVENOUS at 16:59

## 2019-11-07 RX ADMIN — LIDOCAINE HYDROCHLORIDE,EPINEPHRINE BITARTRATE 10 ML: 20; .005 INJECTION, SOLUTION EPIDURAL; INFILTRATION; INTRACAUDAL; PERINEURAL at 16:00

## 2019-11-07 RX ADMIN — CEFAZOLIN SODIUM 2000 MG: 2 SOLUTION INTRAVENOUS at 16:03

## 2019-11-07 RX ADMIN — AZITHROMYCIN 500 MG: 500 INJECTION, POWDER, LYOPHILIZED, FOR SOLUTION INTRAVENOUS at 16:05

## 2019-11-07 NOTE — OB LABOR/OXYTOCIN SAFETY PROGRESS
Oxytocin Safety Progress Check Note - Hershall Gondola 25 y o  female MRN: [id number]    Unit/Bed#: -01 Encounter: [id number]    Dose (tali-units/min) Oxytocin: 18 tali-units/min  Contraction Frequency (minutes): 2-4  Contraction Quality: Strong  Tachysystole: No   Dilation: 4-5        Effacement (%): 90  Station: -1  Baseline Rate: 137 bpm  Fetal Heart Rate: 147 BPM  FHR Category: Category II             Notes/comments: Pt comfortable with epidural    Unchanged cervical check   FHT:  category II,  Base line 130, moderate variability, ctx q 2-3 min, in last 20 minutes presence of variable decelerations, IUPC placed  and amnioinfusion started    Will recheck pt in 2 hours, sooner if patient uncomfortable      Tone Vivas MD 11/7/2019 8:43 AM

## 2019-11-07 NOTE — ANESTHESIA PROCEDURE NOTES
CSE Block    Patient location during procedure: OB  Start time: 11/7/2019 8:08 AM  Reason for block: procedure for pain and at surgeon's request  Staffing  Anesthesiologist: Payal Bateman MD  Performed: anesthesiologist   Preanesthetic Checklist  Completed: patient identified, site marked, surgical consent, pre-op evaluation, timeout performed, IV checked, risks and benefits discussed and monitors and equipment checked  CSE  Patient position: sitting  Prep: ChloraPrep  Patient monitoring: cardiac monitor and continuous pulse ox  Approach: midline  Spinal Needle  Needle type: pencil-tip   Needle gauge: 27 G  Needle length: 10 cm  Epidural Needle  Injection technique: DAYANA air  Needle type: Tuohy   Needle gauge: 18 G  Needle insertion depth: 7 5 cm  Location: lumbar (1-5)  Catheter  Catheter type: side hole  Catheter size: 20 G  Catheter at skin depth: 13 cm  Test dose: negative  Assessment  Sensory level: T10  Injection Assessment:  negative aspiration for heme, no paresthesia on injection, positive aspiration for clear CSF and no pain on injection

## 2019-11-07 NOTE — OB LABOR/OXYTOCIN SAFETY PROGRESS
Oxytocin Safety Progress Check Note - Manuela Anastasiya 25 y o  female MRN: [id number]    Unit/Bed#: -01 Encounter: [id number]    Dose (tali-units/min) Oxytocin: 22 tali-units/min  Contraction Frequency (minutes): 2-4 5  Contraction Quality: Moderate  Tachysystole: No   Dilation: 4-5        Effacement (%): 90  Station: -1  Baseline Rate: 140 bpm  Fetal Heart Rate: 143 BPM  FHR Category: Category II             Notes/comments:   Pt comfortable with epidural    Pt unchanged in this check  FHT:  category I,  Base line 140, moderate variability, ctx q 2-4 min MVUS 110-12, in last 40 minutes presence of intermittent variable decelerations amnioinfusion running       Will recheck pt in 2 hours, sooner if patient uncomfortable      Romulo Alvarado MD 11/7/2019 10:56 AM

## 2019-11-07 NOTE — DISCHARGE SUMMARY
CS Discharge Summary - Lary King 25 y o  female MRN: [id number]    Unit/Bed#: LD PACU-02 Encounter: [id number]    Admission Date: 2019     Discharge Date: 11/10/2019     Admitting Diagnosis:   Patient Active Problem List   Diagnosis    Obesity affecting pregnancy, antepartum    Allergic rhinitis    Diet controlled gestational diabetes mellitus (GDM) in third trimester    39 weeks gestation of pregnancy     Discharge Diagnosis:   Same, delivered    Procedures:   primary  section, low transverse incision    Admitting Attending: Dr Seema Aguilar  Delivery Attending: Dr Seema Aguilar  Discharge Attending: Dr Rashel Blunt service: none  Consult attending: none    Hospital Course:     Lary King is a 25 y o  Toney Robert who was admitted for induction of labor due to A1 GDM  She was started on Pitocin titration for induction of labor  She was artificially ruptured for clear fluid  She was noted to have variable decelerations and an IUPC was placed for amnio infusion  Variable decelerations resolved after amnio infusion  She was made comfortable with an epidural   Patient failed to make change from 4 5 cm the decision was made to proceed with primary low-transverse  arrest of dilation  She then underwent an uncomplicated  section delivery and delivered a viable female  at 200  APGARS were 9, 9 at 1 and 5 minutes, respectively   weighed 8lb 4oz   was then transferred to  nursery  Patient tolerated the procedure well and was transferred to recovery in stable condition  The patient's post partum course was unremarkable    Preoperative hemaglobin was 12 4, postoperative was 10 1  Her postoperative pain was well controlled with oral analgesics  On day of discharge, she was ambulating and able to reasonably perform all ADLs  She was voiding and had appropriate bowel function  Pain was well controlled   She was discharged home on post-operative day #3 without complications  Patient was instructed to follow up with her OB as an outpatient and was given appropriate warnings to call provider if she develops signs of infection or uncontrolled pain  Complications:   None    Condition at discharge:   fair     Provisions for Follow-Up Care:  See after visit summary for information related to follow-up care and any pertinent home health orders  Disposition:   Home    Planned Readmission:   No    Discharge Medications:   Prenatal vitamin daily for 6 months or the duration of nursing whichever is longer  Motrin 600 mg orally every 6 hours as needed for pain  Tylenol (over the counter) per bottle directions as needed for pain  Hydrocortisone cream 1% (over the counter) applied 1-2x daily to hemorrhoids as needed  Witch hazel pads for hemorrhoidal discomfort as needed      Discharge instructions :   -Do not place anything (no partner, tampons or douche) in your vagina for 6 weeks  -You may walk for exercise for the first 6 weeks then gradually return to your usual activities    -Please do not drive for 1 week if you have no stitches and for 2 weeks if you have stitches or underwent a  delivery     -You may take baths or shower per your preference    -Please look at your bust (breasts) in the mirror daily and call provider for redness or tenderness or increased warmth  - If you have had a  please look at your incision daily as well and call provider for increasing redness or steady drainage from the incision    -Please call your provider if temperature > 100 4*F or 38* C, worsening pain or a foul discharge

## 2019-11-07 NOTE — ANESTHESIA POSTPROCEDURE EVALUATION
Post-Op Assessment Note    CV Status:  Stable  Pain Score: 0    Pain management: adequate     Mental Status:  Alert and awake   Hydration Status:  Euvolemic   PONV Controlled:  Controlled   Airway Patency:  Patent   Post Op Vitals Reviewed: Yes      Staff: Anesthesiologist, CRNA     Post-op block assessment: catheter intact and no complications        BP 94/50 (11/07/19 1709)    Temp 97 8 °F (36 6 °C) (11/07/19 1709)    Pulse 98 (11/07/19 1709)   Resp      SpO2 99 % (11/07/19 1709)

## 2019-11-07 NOTE — ANESTHESIA PREPROCEDURE EVALUATION
Review of Systems/Medical History  Patient summary reviewed  Chart reviewed  No history of anesthetic complications     Cardiovascular  Negative cardio ROS Exercise tolerance (METS): >4,     Pulmonary  Negative pulmonary ROS        GI/Hepatic  Negative GI/hepatic ROS          Negative  ROS        Endo/Other  Diabetes well controlled gestational Diet controlled,      GYN  Currently pregnant ,          Hematology  Negative hematology ROS      Musculoskeletal  Negative musculoskeletal ROS Back pain ,        Neurology    Headaches,    Psychology   Anxiety, Depression ,          Patient is a 24 y/o  @ 39 6 with PMH of gestational DM for IOL requesting Labor Analgesia  PLTS 310  Physical Exam    Airway    Mallampati score: II  TM Distance: >3 FB  Neck ROM: full     Dental   No notable dental hx     Cardiovascular  Comment: Negative ROS, Rhythm: regular, Rate: normal, Cardiovascular exam normal    Pulmonary  Pulmonary exam normal Breath sounds clear to auscultation,     Other Findings        Anesthesia Plan  ASA Score- 2     Anesthesia Type- epidural and spinal with ASA Monitors  Additional Monitors:   Airway Plan:         Plan Factors-  Patient did not smoke on day of surgery  Induction-     Postoperative Plan-     Informed Consent- Anesthetic plan and risks discussed with patient  I personally reviewed this patient with the CRNA  Discussed and agreed on the Anesthesia Plan with the VITALIY Pinon

## 2019-11-07 NOTE — OB LABOR/OXYTOCIN SAFETY PROGRESS
Oxytocin Safety Progress Check Note - Gricelda Liu 25 y o  female MRN: [id number]    Unit/Bed#: -01 Encounter: [id number]    Dose (tali-units/min) Oxytocin: 10 tali-units/min  Contraction Frequency (minutes): 2  Contraction Quality: Mild  Tachysystole: No   Dilation: 4        Effacement (%): 80  Station: -2  Baseline Rate: 135 bpm  Fetal Heart Rate: 142 BPM  FHR Category: Category I             Notes/comments:   Pitocin at 10  Cat I FHT  Patient comfortable, but feeling contractions  Continue pitocin titration    Sharee Conway MD 11/7/2019 1:42 AM

## 2019-11-07 NOTE — OB LABOR/OXYTOCIN SAFETY PROGRESS
Oxytocin Safety Progress Check Note - Zachariah  25 y o  female MRN: [id number]    Unit/Bed#: -01 Encounter: [id number]    Dose (tali-units/min) Oxytocin: 6 tali-units/min  Contraction Frequency (minutes): 2-3  Contraction Quality: Mild  Tachysystole: (P) No   Dilation: 3-4        Effacement (%): 80  Station: -2  Baseline Rate: 140 bpm  Fetal Heart Rate: (P) 129 BPM  FHR Category: Category I             Notes/comments:   Pitocin at 6  Patient comfortable, not feeling contractions  Cat I FHT  Continue pitocin titration    Discussed with Dr Flores Thayer MD 11/6/2019 8:34 PM

## 2019-11-07 NOTE — DISCHARGE INSTRUCTIONS
Section   WHAT YOU SHOULD KNOW:   A  delivery, or , is abdominal surgery to deliver your baby  There are many reasons you may need a   · A  may be scheduled before labor if you had a  with your last baby  It may be scheduled if your baby is not positioned normally, or you are pregnant with more than 1 baby  · Your caregiver may perform an emergency  during labor to prevent life-threatening complications for you or your baby  A  may be done if your cervix does not dilate after several hours of active labor  · Other reasons for a  include maternal infections and problems with the placenta  AFTER YOU LEAVE:   Medicines:   · Prescription pain medicine  may be given  Ask how to take this medicine safely  · Acetaminophen  decreases pain and fever  It is available without a doctor's order  Ask how much to take and how often to take it  Follow directions  Acetaminophen can cause liver damage if not taken correctly  · NSAIDs  help decrease swelling and pain or fever  This medicine is available with or without a doctor's order  NSAIDs can cause stomach bleeding or kidney problems in certain people  If you take blood thinner medicine, always ask your obstetrician if NSAIDs are safe for you  Always read the medicine label and follow directions  · Take your medicine as directed  Contact your obstetrician (OB) if you think your medicine is not helping or if you have side effects  Tell him if you are allergic to any medicine  Keep a list of the medicines, vitamins, and herbs you take  Include the amounts, and when and why you take them  Bring the list or the pill bottles to follow-up visits  Carry your medicine list with you in case of an emergency  Follow up with your OB as directed: You may need to return to have your stitches or staples removed  Write down your questions so you remember to ask them during your visits    Wound care:  Carefully wash your wound with soap and water every day  Keep your wound clean and dry  Wear loose, comfortable clothes that do not rub against your wound  Ask your OB about bathing and showering  Drink plenty of liquids: You can lower your risk for a blood clot if you drink plenty of liquids  Ask how much liquid to drink each day and which liquids are best for you  Limit activity until you have fully recovered from surgery:   · Ask when it is safe for you to drive, walk up stairs, lift heavy objects, and have sex  · Ask when it is okay to exercise, and what types of exercise to do  Start slowly and do more as you get stronger  Contact your OB if:   · You have heavy vaginal bleeding that fills 1 or more sanitary pads in 1 hour  · You have a fever  · Your incision is swollen, red, or draining pus  · You have questions or concerns about yourself or your baby  Seek care immediately or call 911 if:   · Blood soaks through your bandage  · Your stitches come apart  · You feel lightheaded, short of breath, and have chest pain  · You cough up blood  · Your arm or leg feels warm, tender, and painful  It may look swollen and red  © 2014 [address] is for End User's use only and may not be sold, redistributed or otherwise used for commercial purposes  All illustrations and images included in CareNotes® are the copyrighted property of A D A M [employer] Inc  or Jorge Byrne  The above information is an  only  It is not intended as medical advice for individual conditions or treatments  Talk to your doctor, nurse or pharmacist before following any medical regimen to see if it is safe and effective for you

## 2019-11-07 NOTE — OB LABOR/OXYTOCIN SAFETY PROGRESS
Oxytocin Safety Progress Check Note - Beth Bond 25 y o  female MRN: [id number]    Unit/Bed#: -01 Encounter: [id number]    Dose (tali-units/min) Oxytocin: 8 tali-units/min  Contraction Frequency (minutes): 2-3  Contraction Quality: Mild  Tachysystole: No   Dilation: 4        Effacement (%): 80  Station: -2  Baseline Rate: 150 bpm  Fetal Heart Rate: 147 BPM  FHR Category: Category I             Notes/comments:   Patient feeling more contractions, but overall comfortable  Would like to walk around  Cat I FHT  Continue pitocin titration    Discussed with Dr Rojo Officer, MD 11/6/2019 11:48 PM

## 2019-11-07 NOTE — OB LABOR/OXYTOCIN SAFETY PROGRESS
Labor Progress Note - Padmini Pontiff 25 y o  female MRN: [id number]    Unit/Bed#: -01 Encounter: [id number]    Dose (tali-units/min) Oxytocin: 0 tali-units/min  Contraction Frequency (minutes): 2-3  Contraction Quality: Moderate  Tachysystole: No   Dilation: 4-5        Effacement (%): 90  Station: -1  Baseline Rate: 135 bpm  Fetal Heart Rate: 131 BPM  FHR Category: Category II             Notes/comments:   Pt comfortable with epidural    Patient without cervical change since this morning 353,  She was on oxytocin titraition, jed every 2-3 minutes, patient evaluated with attending, benefits and risks related with  were discussed with patient  We consider  under diagnosis of arrest of dilation  Pitocin was discontinued  We will transfer patient to operative room one we have surgery room availability  FHT:  category I,  Base line 131, moderate variability, ctx q 2-3 min  Currently without decelerations            Joceline Tse MD 2019 2:00 PM

## 2019-11-07 NOTE — OB LABOR/OXYTOCIN SAFETY PROGRESS
Oxytocin Safety Progress Check Note - Joann Chun 25 y o  female MRN: [id number]    Unit/Bed#: -01 Encounter: [id number]    Dose (tali-units/min) Oxytocin: 18 tali-units/min  Contraction Frequency (minutes): 3-5  Contraction Quality: Strong  Tachysystole: No   Dilation: 4-5        Effacement (%): 80  Station: -2  Baseline Rate: 135 bpm  Fetal Heart Rate: 139 BPM  FHR Category: Category I             Notes/comments:   Patient artificially ruptured for clear fluid  Pitocin at 18  Cat I FHT  Continue pitocin titration    Kain Goodson MD 11/7/2019 3:55 AM Treatment Number: 5 Total Square Area In Cm2 (Required For Proper Billing- Whole Numbers Only Please): 88 Fluence Units: J/cm2 Total Pulses (Optional): 85 Detail Level: Zone Comments: Patient states that she tolerated her last treatment well. Post-Care Instructions: I reviewed with the patient in detail post-care instructions. Patient should stay away from the sun and wear sun protection until treated areas are fully healed. Consent: Written consent obtained, risks reviewed including but not limited to crusting, scabbing, blistering, scarring, darker or lighter pigmentary change, incidental hair removal, bruising, and/or incomplete removal. Spot Size: 2 x 2 cm Location #1: posterior scalp Device Serial Number (Optional): [id number] Mode: repeat paint Fluence #1 (J/Cm2 Or Mj/Cm2): 976

## 2019-11-08 LAB
ERYTHROCYTE [DISTWIDTH] IN BLOOD BY AUTOMATED COUNT: 14 % (ref 11.6–15.1)
HCT VFR BLD AUTO: 30.2 % (ref 34.8–46.1)
HGB BLD-MCNC: 10.1 G/DL (ref 11.5–15.4)
MCH RBC QN AUTO: 30.8 PG (ref 26.8–34.3)
MCHC RBC AUTO-ENTMCNC: 33.4 G/DL (ref 31.4–37.4)
MCV RBC AUTO: 92 FL (ref 82–98)
PLATELET # BLD AUTO: 280 THOUSANDS/UL (ref 149–390)
PMV BLD AUTO: 10 FL (ref 8.9–12.7)
RBC # BLD AUTO: 3.28 MILLION/UL (ref 3.81–5.12)
WBC # BLD AUTO: 18.99 THOUSAND/UL (ref 4.31–10.16)

## 2019-11-08 PROCEDURE — 85027 COMPLETE CBC AUTOMATED: CPT | Performed by: OBSTETRICS & GYNECOLOGY

## 2019-11-08 PROCEDURE — 99024 POSTOP FOLLOW-UP VISIT: CPT | Performed by: OBSTETRICS & GYNECOLOGY

## 2019-11-08 RX ORDER — SIMETHICONE 80 MG
80 TABLET,CHEWABLE ORAL ONCE
Status: COMPLETED | OUTPATIENT
Start: 2019-11-08 | End: 2019-11-08

## 2019-11-08 RX ADMIN — DOCUSATE SODIUM 100 MG: 100 CAPSULE, LIQUID FILLED ORAL at 10:15

## 2019-11-08 RX ADMIN — OXYCODONE HYDROCHLORIDE 5 MG: 5 TABLET ORAL at 17:47

## 2019-11-08 RX ADMIN — KETOROLAC TROMETHAMINE 15 MG: 30 INJECTION, SOLUTION INTRAMUSCULAR at 05:09

## 2019-11-08 RX ADMIN — IBUPROFEN 600 MG: 600 TABLET, FILM COATED ORAL at 16:04

## 2019-11-08 RX ADMIN — DOCUSATE SODIUM 100 MG: 100 CAPSULE, LIQUID FILLED ORAL at 16:04

## 2019-11-08 RX ADMIN — IBUPROFEN 600 MG: 600 TABLET, FILM COATED ORAL at 22:49

## 2019-11-08 RX ADMIN — KETOROLAC TROMETHAMINE 15 MG: 30 INJECTION, SOLUTION INTRAMUSCULAR at 10:15

## 2019-11-08 RX ADMIN — SIMETHICONE CHEW TAB 80 MG 80 MG: 80 TABLET ORAL at 22:48

## 2019-11-08 RX ADMIN — SODIUM CHLORIDE, SODIUM LACTATE, POTASSIUM CHLORIDE, AND CALCIUM CHLORIDE 125 ML/HR: .6; .31; .03; .02 INJECTION, SOLUTION INTRAVENOUS at 03:23

## 2019-11-08 NOTE — PROGRESS NOTES
Progress Note - OB/GYN  Zachariah Antonio 25 y o  female MRN: [id number]  Unit/Bed#: -01 Encounter: [id number]      Subjective:  Patient feels generally well and has no complaints at this time  She was able to eat some dinner and take a nap  Pain: well controlled  Tolerating PO: yes  Voiding: Flores in place  Flatus: no  BM: no  Ambulating: no  Breastfeeding:  yes  Chest pain: no  Shortness of breath: no  Leg pain: no  Lochia: similar to menses    Vitals:   /64 (BP Location: Right arm)   Pulse 95   Temp 98 8 °F (37 1 °C) (Oral)   Resp 18   Ht 4' 11" (1 499 m)   Wt 90 3 kg (199 lb)   LMP 12/28/2018   SpO2 95%   Breastfeeding?  Yes   BMI 40 19 kg/m²       Intake/Output Summary (Last 24 hours) at 11/7/2019 2321  Last data filed at 11/7/2019 2257  Gross per 24 hour   Intake --   Output 2260 ml   Net -2260 ml       Invasive Devices     Peripheral Intravenous Line            Peripheral IV 11/06/19 Left;Dorsal (posterior) Wrist 1 day    Peripheral IV 11/07/19 Right;Ventral (anterior) Forearm less than 1 day          Drain            Urethral Catheter Double-lumen;Non-latex 16 Fr  less than 1 day                Physical Exam:   GEN: Zachariah Antonio appears well, alert and oriented x 3, pleasant and cooperative   ABDOMEN: soft, no tenderness, no distention, fundus @ -3 below umbilicus, Incision C/D/I  EXTREMITIES: SCDs on      Labs:   Admission on 11/06/2019   Component Date Value    ABO Grouping 11/06/2019 A     Rh Factor 11/06/2019 Positive     Antibody Screen 11/06/2019 Negative     Specimen Expiration Date 11/06/2019 [id number]     WBC 11/06/2019 15 97*    RBC 11/06/2019 4 04     Hemoglobin 11/06/2019 12 4     Hematocrit 11/06/2019 36 9     MCV 11/06/2019 91     MCH 11/06/2019 30 7     MCHC 11/06/2019 33 6     RDW 11/06/2019 14 2     Platelets 78/65/9908 310     MPV 11/06/2019 9 6     RPR 11/06/2019 Non-Reactive     POC Glucose 11/06/2019 135     POC Glucose 11/06/2019 92     POC Glucose 2019 111     POC Glucose 2019 77     POC Glucose 2019 109     POC Glucose 2019 98     POC Glucose 2019 84     pH, Cord Goran 2019 7 373     pCO2, Cord Goran 2019 43 9*    pO2, Cord Goran 2019 32 0     HCO3, Cord Goran 2019 25 0     Base Exc, Cord Goran 2019 -0 4*    O2 Cont, Cord Goran 2019 13 0     O2 HGB,VENOUS CORD 2019 75 6     pH, Cord Art 2019 7 279     pCO2, Cord Art 2019 59 4     pO2, Cord Art 2019 13 7     HCO3, Cord Art 2019 27 2     Base Exc, Cord Art 2019 -0 5*    O2 Content, Cord Art 2019 3 8     O2 Hgb, Arterial Cord 2019 21 9          Patient Active Problem List   Diagnosis    Obesity affecting pregnancy, antepartum    Allergic rhinitis    Diet controlled gestational diabetes mellitus (GDM) in third trimester    39 weeks gestation of pregnancy    S/P primary low transverse         Assessment:  Postop Day #0 s/p 1LTCS, stable    Plan:   Postoperative care   Hgb 12 4 g/dL --> follow up AM CBC   Urinary output 1 67 mg/kg/hr, staton in place, to be removed in AM   Encourage ambulation   Encourage breastfeeding   Anticipate discharge 11/10/19    Telma Ahn MD  2019  11:21 PM

## 2019-11-08 NOTE — OP NOTE
Section Procedure Note    Indications:   Arrest of dilation    Pre-operative Diagnosis:   Patient Active Problem List   Diagnosis    Obesity affecting pregnancy, antepartum    Allergic rhinitis    Diet controlled gestational diabetes mellitus (GDM) in third trimester    39 weeks gestation of pregnancy     Post-operative Diagnosis:   Patient Active Problem List   Diagnosis    Obesity affecting pregnancy, antepartum    Allergic rhinitis    Diet controlled gestational diabetes mellitus (GDM) in third trimester    39 weeks gestation of pregnancy    S/P primary low transverse        Attending: Paxton Brar MD  Resident: Tish Villeda MD    Maternal Findings:  Normal uterus  Normal tubes and ovaries bilaterally  No adhesions  No difficulty noted from skin to delivery     Findings:  Viable female weighing 8lbs 4oz;  Apgar scores of 9 at one minute and 9 at five minutes  Clear amniotic fluid  Normal placenta with 3-vessel cord    Arterial and Venous Gases:  Umbilical Cord Venous Blood Gas:  Results from last 7 days   Lab Units 19  1623   PH COV  7 373   PCO2 COV mm HG 43 9*   HCO3 COV mmol/L 25 0   BASE EXC COV mmol/L -0 4*   O2 CT CD VB mL/dL 13 0   O2 HGB, VENOUS CORD % 81 6     Umbilical Cord Arterial Blood Gas:  Results from last 7 days   Lab Units 19  1623   PH COA  7 279   PCO2 COA  59 4   PO2 COA mm HG 13 7   HCO3 COA mmol/L 27 2   BASE EXC COA mmol/L -0 5*   O2 CONTENT CORD ART ml/dl 3 8   O2 HGB, ARTERIAL CORD % 21 9       Specimens: Arterial and venous cord gases, cord blood, segment of umbilical cord, placenta to storage    Quantitative Blood Loss: 685 mL    Drains: Flores catheter           Complications:  None; patient tolerated the procedure well  Disposition: PACU            Condition: stable    Brief Labor Course:   Patient was admitted for induction of labor due to A1 GDM  She was started on Pitocin titration for induction of labor    She was artificially ruptured for clear fluid  She was noted to have variable decelerations and an IUPC was placed for amnio infusion  Variable decelerations resolved after amnio infusion  She was made comfortable with an epidural   Patient failed to make change from 4 5 cm the decision was made to proceed with primary low-transverse  arrest of dilation  Procedure Details   The patient was seen prior to the procedure  Risks, benefits, possible complications, alternate treatment options, and expected outcomes were discussed with the patient  The patient agreed with the proposed plan and gave informed consent for a primary low-transverse  section  The patient was taken to the [facility] Operating Room where she received augmentation of her epidural anesthesia  For infection prophylaxis, she received Ancef and azithromycin preoperatively  Fetal heart tones in the OR were assessed and noted to be within normal limits and SCDs were placed  The abdomen was prepped with Chloraprep, the vagina was prepped with Betadine, and following appropriate drying time, the patient was draped in the usual sterile manner  A Time Out was held and the above information confirmed  The patient was identified as Beth Bond and the procedure verified as a  Delivery  A Pfannenstiel incision was made and carried down through the underlying subcutaneous tissue to the fascia using a scalpel  The rectus fascia was then nicked in the midline and dissected laterally using Phan scissors  The superior edge of the  fascial incision was grasped with Kocher clamps bilaterally, tented upward and the underlying rectus muscles were dissected off sharply with Phan scissors  This was repeated on the inferior edge of the fascia and dissected down to the pubic rami  The rectus muscles were  and the peritoneum was identified, entered, and extended longitudinally with blunt dissection  The bladder blade was inserted  The vesicouterine peritoneum was identified, entered sharply, and dissected laterally with  Metzenbaum scissors to form a bladder flap  The bladder blade was repositioned  A low transverse uterine incision was made with the scalpel and extended laterally with blunt dissection  The amnion was entered bluntly  The fetal head was palpated, elevated, and delivered through the uterine incision followed by the body without difficulty  There was noted to be spontaneous cry and good tone  There was no apparent injury to the   The umbilical cord was doubly clamped and cut after 30 seconds to allow for delayed cord clamping  The infant was handed off to the  providers  Arterial and venous cord gases, cord blood, and a segment of umbilical cord were obtained for evaluation  The placenta delivered spontaneously with uterine fundal massage and appeared normal  The uterus was exteriorized and cleaned out with a moist lap sponge  The hysterotomy angles were secured with single interrupted stitch at each side of 0 Vicryl  The uterine incision was closed with a running locked suture of 0 Vicryl  A second layer of the same suture was used to imbricate the first   Hemostasis was noted to be excellent  The posterior cul-de-sac was inspected and suctioned  The uterus was returned to the abdomen  The paracolic gutters were inspected and cleared of all clots and debris with moist lap sponges  The fascia was closed with a running suture of 0 Vicryl  Subcutaneous adipose tissue was closed with a running suture of 2-0 Plain gut  The skin was closed with a subcuticular running suture of 4-0 Monocryl  Exofin and telfa was applied  The patient appeared to tolerate the procedure very well  Lap sponge, needle, and instrument counts were correct x2  The patient was transferred to her postpartum recovery room in stable condition and her infant went to the  nursery      Attending Attestation: Dr Mary Cerda Steven Key MD was present for the entire procedure  Marisabel Austin MD  OB/GYN  11/7/2019  7:03 PM

## 2019-11-08 NOTE — PROGRESS NOTES
Progress Note - OB/GYN   Rosita Mcnally 25 y o  female MRN: [id number]  Unit/Bed#: -01 Encounter: [id number]    Assessment:  Post partum Day #1 s/p1LTCS, stable, baby in room with mom     Plan:  1  Post partum   - Continue routine post partum care  - Encourage ambulation  - Encourage breastfeeding  - QBL: 685; Hgb 12 4 --> 10 1  - UOP: 108cc/hr overnight; Anticipate staton removal and voiding trial today    2  Discharge planning  - Anticipate discharge POD3        Subjective/Objective   Chief Complaint:     Post delivery  Patient is doing well  Lochia WNL  Pain well controlled  Subjective: This morning, she states that she has been itching  Pain: yes, improved with meds  Tolerating PO: yes  Voiding: Staton in place  Flatus: yes  BM: no  Ambulating: no  Breastfeeding:  Yes, struggled overnight  Chest pain: no  Shortness of breath: no  Leg pain: no  Lochia: minimal    Objective:     Vitals: /55 (BP Location: Right arm)   Pulse 94   Temp 97 9 °F (36 6 °C) (Oral)   Resp 18   Ht 4' 11" (1 499 m)   Wt 90 3 kg (199 lb)   LMP 12/28/2018   SpO2 96%   Breastfeeding? Yes   BMI 40 19 kg/m²       Intake/Output Summary (Last 24 hours) at 11/8/2019 0643  Last data filed at 11/8/2019 0530  Gross per 24 hour   Intake 1263 58 ml   Output 2910 ml   Net -1646 42 ml       Lab Results   Component Value Date    WBC 18 99 (H) 11/08/2019    HGB 10 1 (L) 11/08/2019    HCT 30 2 (L) 11/08/2019    MCV 92 11/08/2019     11/08/2019       Physical Exam:   Physical Exam   Constitutional: She appears well-developed and well-nourished  Cardiovascular: Normal rate, regular rhythm and normal heart sounds  Exam reveals no gallop and no friction rub  No murmur heard  Pulmonary/Chest: Effort normal and breath sounds normal  No stridor  No respiratory distress  She has no wheezes  Abdominal: Soft  She exhibits no distension  There is no tenderness  There is no guarding     Incision clean, dry, and intact without evidence of infection, erythema, or bleeding   Genitourinary:   Genitourinary Comments: Uterus nontender, firm at 1cm below the umbilicus   Skin: Skin is warm and dry  Psychiatric: She has a normal mood and affect  Her behavior is normal    Vitals reviewed      Kelsey Kumar MD  11/8/2019  6:43 AM

## 2019-11-08 NOTE — SOCIAL WORK
Consult: Breast Pump    CM spoke with pt to discuss freedom of choice in choosing a breast pump  Pt identified that she would like a Medela breast pump  CM will deliver to pt's room and fax delivery ticket via [facility] to [employer]

## 2019-11-08 NOTE — LACTATION NOTE
This note was copied from a baby's chart  CONSULT - LACTATION  Baby Girl Monroe Gallo 1 days female MRN: [id number]    [facility] Room / Bed: (N)/(N) Encounter: [id number]    Maternal Information     MOTHER:  Jaelyn Gallo  Maternal Age: 25 y o    OB History: #: 1, Date: 19, Sex: Female, Weight: 3742 g (8 lb 4 oz), GA: 39w6d, Delivery: , Low Transverse, Apgar1: 9, Apgar5: 9, Living: Living, Birth Comments: None   Previouse breast reduction surgery? No    Lactation history:   Has patient previously breast fed: No   How long had patient previously breast fed:     Previous breast feeding complications:       Past Surgical History:   Procedure Laterality Date    SKIN GRAFT      Full-thickness burn on back- reportedly complication of liposuction procedure in DR summer         Birth information:  YOB: 2019   Time of birth: 4:23 PM   Sex: female   Delivery type: , Low Transverse   Birth Weight: 3742 g (8 lb 4 oz)   Percent of Weight Change: -2%     Gestational Age: 37w11d   [unfilled]    Assessment     Breast and nipple assessment: large nipples    [place]wood Assessment: suck issue (tongue-sucking)    Feeding assessment: no latch  LATCH:  Latch: Too sleepy or reluctant, no latch achieved   Audible Swallowing: None   Type of Nipple: Flat   Comfort (Breast/Nipple): Soft/non-tender   Hold (Positioning): No assist from staff, mother able to position/hold infant   LATCH Score: 5          Feeding recommendations:  breast feed on demand, pump if no latch    Met with mother  Provided mother with Ready, Set, Baby booklet  Discussed Skin to Skin contact an benefits to mom and baby  Talked about the delay of the first bath until baby has adjusted  Spoke about the benefits of rooming in  Feeding on cue and what that means for recognizing infant's hunger  Avoidance of pacifiers for the first month discussed   Talked about exclusive breastfeeding for the first 6 months  Positioning and latch reviewed as well as showing images of other feeding positions  Discussed the properties of a good latch in any position  Reviewed hand/manual expression  Discussed s/s that baby is getting enough milk and some s/s that breastfeeding dyad may need further help  Gave information on common concerns, what to expect the first few weeks after delivery, preparing for other caregivers, and how partners can help  Resources for support also provided  Discussed 2nd night syndrome and ways to calm infant  Hand out given  Information on hand expression given  Discussed benefits of knowing how to manually express breast including stimulating milk supply, softening nipple for latch and evacuating breast in the event of engorgement  No latch at this time  Infant shows interest in latching but tongue thrusts out nipple as she sucks  Mom has been finger feeding Sim via SNS  Attempted at the breast at this time with no success  Attempted nipple shield, infant does not latch deeply  Enc mom to pump often and continue to offer breast before supplementing  Enc her to call for lactation support as needed throughout her stay       Rhoda Verduzco RN 11/8/2019 3:45 PM

## 2019-11-08 NOTE — PLAN OF CARE
Problem: PAIN - ADULT  Goal: Verbalizes/displays adequate comfort level or baseline comfort level  Description  Interventions:  - Encourage patient to monitor pain and request assistance  - Assess pain using appropriate pain scale  - Administer analgesics based on type and severity of pain and evaluate response  - Implement non-pharmacological measures as appropriate and evaluate response  - Consider cultural and social influences on pain and pain management  - Notify physician/advanced practitioner if interventions unsuccessful or patient reports new pain  Outcome: Progressing     Problem: INFECTION - ADULT  Goal: Absence or prevention of progression during hospitalization  Description  INTERVENTIONS:  - Assess and monitor for signs and symptoms of infection  - Monitor lab/diagnostic results  - Monitor all insertion sites, i e  indwelling lines, tubes, and drains  - Monitor endotracheal if appropriate and nasal secretions for changes in amount and color  - [place] appropriate cooling/warming therapies per order  - Administer medications as ordered  - Instruct and encourage patient and family to use good hand hygiene technique  - Identify and instruct in appropriate isolation precautions for identified infection/condition  Outcome: Progressing  Goal: Absence of fever/infection during neutropenic period  Description  INTERVENTIONS:  - Monitor WBC    Outcome: Progressing     Problem: SAFETY ADULT  Goal: Patient will remain free of falls  Description  INTERVENTIONS:  - Assess patient frequently for physical needs  -  Identify cognitive and physical deficits and behaviors that affect risk of falls    -  [place] fall precautions as indicated by assessment   - Educate patient/family on patient safety including physical limitations  - Instruct patient to call for assistance with activity based on assessment  - Modify environment to reduce risk of injury  - Consider OT/PT consult to assist with strengthening/mobility  Outcome: Progressing  Goal: Maintain or return to baseline ADL function  Description  INTERVENTIONS:  -  Assess patient's ability to carry out ADLs; assess patient's baseline for ADL function and identify physical deficits which impact ability to perform ADLs (bathing, care of mouth/teeth, toileting, grooming, dressing, etc )  - Assess/evaluate cause of self-care deficits   - Assess range of motion  - Assess patient's mobility; develop plan if impaired  - Assess patient's need for assistive devices and provide as appropriate  - Encourage maximum independence but intervene and supervise when necessary  - Involve family in performance of ADLs  - Assess for home care needs following discharge   - Consider OT consult to assist with ADL evaluation and planning for discharge  - Provide patient education as appropriate  Outcome: Progressing  Goal: Maintain or return mobility status to optimal level  Description  INTERVENTIONS:  - Assess patient's baseline mobility status (ambulation, transfers, stairs, etc )    - Identify cognitive and physical deficits and behaviors that affect mobility  - Identify mobility aids required to assist with transfers and/or ambulation (gait belt, sit-to-stand, lift, walker, cane, etc )  - [place] fall precautions as indicated by assessment  - Record patient progress and toleration of activity level on Mobility SBAR; progress patient to next Phase/Stage  - Instruct patient to call for assistance with activity based on assessment  - Consider rehabilitation consult to assist with strengthening/weightbearing, etc   Outcome: Progressing     Problem: Knowledge Deficit  Goal: Patient/family/caregiver demonstrates understanding of disease process, treatment plan, medications, and discharge instructions  Description  Complete learning assessment and assess knowledge base    Interventions:  - Provide teaching at level of understanding  - Provide teaching via preferred learning methods  Outcome: Progressing     Problem: DISCHARGE PLANNING  Goal: Discharge to home or other facility with appropriate resources  Description  INTERVENTIONS:  - Identify barriers to discharge w/patient and caregiver  - Arrange for needed discharge resources and transportation as appropriate  - Identify discharge learning needs (meds, wound care, etc )  - Arrange for interpretive services to assist at discharge as needed  - Refer to Case Management Department for coordinating discharge planning if the patient needs post-hospital services based on physician/advanced practitioner order or complex needs related to functional status, cognitive ability, or social support system  Outcome: Progressing     Problem: POSTPARTUM  Goal: Experiences normal postpartum course  Description  INTERVENTIONS:  - Monitor maternal vital signs  - Assess uterine involution and lochia  Outcome: Progressing  Goal: Appropriate maternal -  bonding  Description  INTERVENTIONS:  - Identify family support  - Assess for appropriate maternal/infant bonding   -Encourage maternal/infant bonding opportunities  - Referral to  or  as needed  Outcome: Progressing  Goal: Establishment of infant feeding pattern  Description  INTERVENTIONS:  - Assess breast/bottle feeding  - Refer to lactation as needed  Outcome: Progressing  Goal: Incision(s), wounds(s) or drain site(s) healing without S/S of infection  Description  INTERVENTIONS  - Assess and document risk factors for skin impairment   - Assess and document dressing, incision, wound bed, drain sites and surrounding tissue  - Consider nutrition services referral as needed  - Oral mucous membranes remain intact  - Provide patient/ family education  Outcome: Progressing

## 2019-11-09 PROCEDURE — 99024 POSTOP FOLLOW-UP VISIT: CPT | Performed by: OBSTETRICS & GYNECOLOGY

## 2019-11-09 RX ORDER — SIMETHICONE 80 MG
80 TABLET,CHEWABLE ORAL EVERY 6 HOURS PRN
Status: DISCONTINUED | OUTPATIENT
Start: 2019-11-09 | End: 2019-11-10 | Stop reason: HOSPADM

## 2019-11-09 RX ADMIN — SIMETHICONE CHEW TAB 80 MG 80 MG: 80 TABLET ORAL at 19:24

## 2019-11-09 RX ADMIN — IBUPROFEN 600 MG: 600 TABLET, FILM COATED ORAL at 08:05

## 2019-11-09 RX ADMIN — OXYCODONE HYDROCHLORIDE 5 MG: 5 TABLET ORAL at 04:22

## 2019-11-09 RX ADMIN — DOCUSATE SODIUM 100 MG: 100 CAPSULE, LIQUID FILLED ORAL at 08:05

## 2019-11-09 RX ADMIN — DOCUSATE SODIUM 100 MG: 100 CAPSULE, LIQUID FILLED ORAL at 18:42

## 2019-11-09 RX ADMIN — OXYCODONE HYDROCHLORIDE 5 MG: 5 TABLET ORAL at 23:32

## 2019-11-09 RX ADMIN — IBUPROFEN 600 MG: 600 TABLET, FILM COATED ORAL at 14:07

## 2019-11-09 NOTE — LACTATION NOTE
This note was copied from a baby's chart  Mom states she has been pumping and finger feeding formula due to infant not latching  Infant assisted to breast In football hold  Infant did, with assist, latch to nipple  Mom C/O infant sl fussy  Infant then latched to nipple with SNS in place and did well  Encouraged mom to call for assistance as needed

## 2019-11-10 VITALS
WEIGHT: 199 LBS | DIASTOLIC BLOOD PRESSURE: 66 MMHG | HEART RATE: 84 BPM | BODY MASS INDEX: 40.12 KG/M2 | SYSTOLIC BLOOD PRESSURE: 100 MMHG | HEIGHT: 59 IN | RESPIRATION RATE: 20 BRPM | OXYGEN SATURATION: 97 % | TEMPERATURE: 98.2 F

## 2019-11-10 PROCEDURE — 99024 POSTOP FOLLOW-UP VISIT: CPT | Performed by: OBSTETRICS & GYNECOLOGY

## 2019-11-10 RX ORDER — IBUPROFEN 600 MG/1
600 TABLET ORAL EVERY 6 HOURS PRN
Qty: 30 TABLET | Refills: 0 | Status: SHIPPED | OUTPATIENT
Start: 2019-11-10

## 2019-11-10 RX ADMIN — OXYCODONE HYDROCHLORIDE 5 MG: 5 TABLET ORAL at 14:28

## 2019-11-10 RX ADMIN — IBUPROFEN 600 MG: 600 TABLET, FILM COATED ORAL at 06:18

## 2019-11-10 RX ADMIN — DOCUSATE SODIUM 100 MG: 100 CAPSULE, LIQUID FILLED ORAL at 08:43

## 2019-11-10 NOTE — LACTATION NOTE
This note was copied from a baby's chart  Mom states infant is fussy at breast at times when not using SNS  Explained infant is now used to flow and she may need to supplement at breast until her own milk is in  Observed infant at breast in football hold  Good latch noted  Reviewed expected changes in infant feeding patterns over the next few days, engorgement relief measures, signs of milk transfer, use of feeding log and when and where to call for additional assistance as needed

## 2019-11-10 NOTE — PROGRESS NOTES
I personally saw, examined and spoke with the patient on rounds this morning    Patient doing very well    Patient describes mild discomfort in the region of the incision    Patient is out of bed, eating well and ambulating without difficulty    Patient also voiding and passing gas as well    Incision healing extremely nicely    Baby also doing well    Plan is for discharge later today    Will see patient back in the office in 7-10 days    Patient will call for any problems, issues or concerns which may arise for her

## 2019-11-10 NOTE — PROGRESS NOTES
Progress Note - OB/GYN  Luci Boas 25 y o  female MRN: [id number]  Unit/Bed#: -01 Encounter: [id number]      Assessment:  Post partum Day #2 s/p1LTCS, stable, baby in room with mom      Plan:  1  Post partum   - Continue routine post partum care  - Encourage ambulation  - Encourage breastfeeding  - QBL: 685; Hgb 12 4 --> 10 1  -Voiding adequately      2  Discharge planning  - Anticipate discharge POD3     Chief Complaint:    Post delivery  Patient is doing well  Lochia WNL  Pain well controlled  Subjective    Pain: well-controlled with medications  Tolerating PO: yes  Voiding: yes  Flatus: yes  BM: no  Ambulating: yes  Breastfeeding:  Yes/Formula   Chest pain: no  Shortness of breath: no  Leg pain: no  Lochia: WNL    Vitals:   /63   Pulse 98   Temp 98 4 °F (36 9 °C) (Oral)   Resp 18   Ht 4' 11" (1 499 m)   Wt 90 3 kg (199 lb)   LMP 12/28/2018   SpO2 97%   Breastfeeding?  Yes   BMI 40 19 kg/m²     No intake or output data in the 24 hours ending 11/09/19 1909    Invasive Devices     None                 Physical Exam:   GEN: Luci Boas appears well, alert and oriented x 3, pleasant and cooperative   ABDOMEN: soft, no tenderness, no distention, fundus firm and -1cm, Incision C/D/I  EXTREMITIES: SCDs off, no leg pain or tenderness      Labs:   Admission on 11/06/2019   Component Date Value    ABO Grouping 11/06/2019 A     Rh Factor 11/06/2019 Positive     Antibody Screen 11/06/2019 Negative     Specimen Expiration Date 11/06/2019 [id number]     WBC 11/06/2019 15 97*    RBC 11/06/2019 4 04     Hemoglobin 11/06/2019 12 4     Hematocrit 11/06/2019 36 9     MCV 11/06/2019 91     MCH 11/06/2019 30 7     MCHC 11/06/2019 33 6     RDW 11/06/2019 14 2     Platelets 59/44/5451 310     MPV 11/06/2019 9 6     RPR 11/06/2019 Non-Reactive     POC Glucose 11/06/2019 135     POC Glucose 11/06/2019 92     POC Glucose 11/06/2019 111     POC Glucose 11/06/2019 77     POC Glucose 11/07/2019 109     POC Glucose 2019 98     POC Glucose 2019 84     pH, Cord Gorna 2019 7 373     pCO2, Cord Goran 2019 43 9*    pO2, Cord Goran 2019 32 0     HCO3, Cord Goran 2019 25 0    [facility] Exc, Cord Goran 2019 -0 4*    O2 Cont, Cord Goran 2019 13 0     O2 HGB,VENOUS CORD 2019 75 6     pH, Cord Art 2019 7 279     pCO2, Cord Art 2019 59 4     pO2, Cord Art 2019 13 7     HCO3, Cord Art 2019 27 2     Base Exc, Cord Art 2019 -0 5*    O2 Content, Cord Art 2019 3 8     O2 Hgb, Arterial Cord 2019 21 9     WBC 2019 18 99*    RBC 2019 3 28*    Hemoglobin 2019 10 1*    Hematocrit 2019 30 2*    MCV 2019 92     MCH 2019 30 8     MCHC 2019 33 4     RDW 2019 14 0     Platelets  280     MPV 2019 10 0          Patient Active Problem List   Diagnosis    Obesity affecting pregnancy, antepartum    Allergic rhinitis    Diet controlled gestational diabetes mellitus (GDM) in third trimester    39 weeks gestation of pregnancy    S/P primary low transverse           José Antonio Caruso MD  2019  7:09 PM

## 2019-11-11 ENCOUNTER — TRANSITIONAL CARE MANAGEMENT (OUTPATIENT)
Dept: FAMILY MEDICINE CLINIC | Facility: CLINIC | Age: 24
End: 2019-11-11

## 2019-11-11 NOTE — UTILIZATION REVIEW
Notification of Maternity/Delivery & [facility] Birth Information for Admission   Notification of Maternity/Delivery for Admission to our facility [address]  Be advised that this patient was admitted to our facility under Inpatient Status  Contact Xavier Moore at [phone] for additional admission information  Shilpa Wolf PARENT/CHILD HEALTH UR DEPT DEDICATED [place] [phone]  Mother & Quentin Information   Patient Name: Reed Palomo   YOB: 1995   Delivering clinician: Lon Garcia   OB History        1    Para   1    Term   1       0    AB   0    Living   1       SAB   0    TAB   0    Ectopic   0    Multiple   0    Live Births   1           Obstetric Comments   A1GDM            Name & MRN:   Information for the patient's :  Radha Nolen [[id number]]      Delivery Information:  Sex: female  Delivered 2019 4:23 PM by , Low Transverse; Gestational Age: 37w11d     Measurements:  Weight: 8 lb 4 oz (3742 g); Height: 20 5"    APGAR 1 minute 5 minutes 10 minutes   Totals: 9 9       Birth Information: 25 y o  female MRN: [id number] Unit/Bed#: -01 Estimated Date of Delivery: 19  Birthweight: No birth weight on file   Gestational Age: <None> Delivery Type: , Low Transverse          APGARS  One minute Five minutes Ten minutes   Totals:                 [address]   [address]:   [facility]  Tax ID: [id number]  NPI: [id number] Attending Provider/NPI: Lon Garcia Md [[id number]]   Place of Service Code: 24     Place of Service Name:  Inpatient Hospital   Start Date: 19 IPADMITTIME@     Discharge Date & Time: 11/10/2019  4:25 PM    Type of Admission: Inpatient Status Discharge Disposition (if discharged): Home/Self Care   Patient Diagnoses:   Encounter for full-term uncomplicated delivery [L25]  The primary encounter diagnosis was 39 weeks gestation of pregnancy  Diagnoses of Arrest of dilation, delivered, current hospitalization and S/P primary low transverse  were also pertinent to this visit  1  39 weeks gestation of pregnancy    2  Arrest of dilation, delivered, current hospitalization    3  S/P primary low transverse        Orders: Admission Orders (From admission, onward)     Ordered        19 1645  Inpatient Admission  Once                    Assigned Utilization Review Contact: Erasmo Nuñez  Utilization   Network Utilization Review Department  Phone: [phone]; Fax [phone]  Email: [email or web address]

## 2019-11-15 LAB — PLACENTA IN STORAGE: NORMAL

## 2019-11-17 NOTE — PATIENT INSTRUCTIONS
Patient is here today for her routine 39 week obstetrical visit     She states that she is feeling well and has no complaints at this time  Baby is moving well  She denies any bleeding or loss of fluid or any abdominal pain        She had a level 2 scan at the  center recently which was within normal limits     Blood pressure is normal today and urine screens are all negative     I encouraged her to continue to eat well and also to take adequate amounts of fluid on a daily basis     She is taking her prenatal vitamins daily     All questions were answered for her      We will see her back for scheduled induction     She was told to call should any problems or concerns arise during the interim

## 2019-11-17 NOTE — PROGRESS NOTES
Assessment     Pregnancy 39 and 4/7 weeks     Plan     Plans for delivery: Vaginal anticipated  Beta strep culture: done  Counseling: Consent signed  Fetal testing: discussed  Follow up in for scheduled induction     Patient is here today for her routine 39 week obstetrical visit     She states that she is feeling well and has no complaints at this time  Baby is moving well  She denies any bleeding or loss of fluid or any abdominal pain  She had a level 2 scan at the  center recently which was within normal limits     Blood pressure is normal today and urine screens are all negative     I encouraged her to continue to eat well and also to take adequate amounts of fluid on a daily basis     She is taking her prenatal vitamins daily     All questions were answered for her      We will see her back for scheduled induction    Exam today was negative for ruptured membranes     She was told to call should any problems or concerns arise during the interim      Subjective     Viv Fitch is a 25 y o  female being seen today for her obstetrical visit  She is at 39w6d gestation  Patient reports backache, fatigue, heartburn, no bleeding and occasional contractions  Fetal movement: normal     Menstrual History:  OB History        1    Para   1    Term   1       0    AB   0    Living   1       SAB   0    TAB   0    Ectopic   0    Multiple   0    Live Births   1           Obstetric Comments   A1GDM            Menarche age:   Patient's last menstrual period was 2018  The following portions of the patient's history were reviewed and updated as appropriate: allergies, current medications, past family history, past medical history, past social history, past surgical history and problem list     Review of Systems  Pertinent items are noted in HPI       Objective     /70   Pulse 98   Ht 4' 11" (1 499 m)   Wt 90 4 kg (199 lb 3 2 oz)   LMP 2018   BMI 40 23 kg/m²   FHT: 142 BPM   Uterine Size: size equals dates   Presentations: cephalic   Pelvic Exam:     Dilation: 3cm    Effacement: 70%    Station:  -1    Consistency: soft    Position: middle

## 2019-11-19 ENCOUNTER — POSTPARTUM VISIT (OUTPATIENT)
Dept: OBGYN CLINIC | Facility: CLINIC | Age: 24
End: 2019-11-19
Payer: COMMERCIAL

## 2019-11-19 VITALS
BODY MASS INDEX: 36.89 KG/M2 | WEIGHT: 183 LBS | DIASTOLIC BLOOD PRESSURE: 70 MMHG | HEIGHT: 59 IN | SYSTOLIC BLOOD PRESSURE: 114 MMHG

## 2019-11-19 DIAGNOSIS — R30.0 BURNING WITH URINATION: ICD-10-CM

## 2019-11-19 DIAGNOSIS — Z98.891 S/P PRIMARY LOW TRANSVERSE C-SECTION: Primary | ICD-10-CM

## 2019-11-19 DIAGNOSIS — L29.2 VULVAR ITCHING: ICD-10-CM

## 2019-11-19 PROBLEM — Z3A.39 39 WEEKS GESTATION OF PREGNANCY: Status: RESOLVED | Noted: 2019-11-06 | Resolved: 2019-11-19

## 2019-11-19 LAB
SL AMB  POCT GLUCOSE, UA: NEGATIVE
SL AMB LEUKOCYTE ESTERASE,UA: POSITIVE
SL AMB POCT BILIRUBIN,UA: NEGATIVE
SL AMB POCT BLOOD,UA: POSITIVE
SL AMB POCT CLARITY,UA: ABNORMAL
SL AMB POCT COLOR,UA: YELLOW
SL AMB POCT KETONES,UA: NEGATIVE
SL AMB POCT NITRITE,UA: NEGATIVE
SL AMB POCT PH,UA: 5
SL AMB POCT SPECIFIC GRAVITY,UA: 1.01
SL AMB POCT URINE PROTEIN: NEGATIVE
SL AMB POCT UROBILINOGEN: NEGATIVE

## 2019-11-19 PROCEDURE — 87070 CULTURE OTHR SPECIMN AEROBIC: CPT | Performed by: PHYSICIAN ASSISTANT

## 2019-11-19 PROCEDURE — 99213 OFFICE O/P EST LOW 20 MIN: CPT | Performed by: PHYSICIAN ASSISTANT

## 2019-11-19 PROCEDURE — 87086 URINE CULTURE/COLONY COUNT: CPT | Performed by: PHYSICIAN ASSISTANT

## 2019-11-19 PROCEDURE — 81002 URINALYSIS NONAUTO W/O SCOPE: CPT | Performed by: PHYSICIAN ASSISTANT

## 2019-11-19 NOTE — PROGRESS NOTES
OB POSTPARTUM VISIT PROGRESS NOTE  Date of Encounter: 2019    Lm Domínguez    : 1995  (25 y o )  MR: [id number]    Subjective   Kamala Erazo is in for her postpartum visit  She is now   She delivered by primary  section  She's generally doing well, denies current pain or bleeding issues, and has no significant depression issues  She is breast feeding exclusively  We discussed all appropriate contraceptive options and she chooses None  Patient is here for post partum incision check  States she is doing well overall  Incision well healed; no bleeding, drainage, swelling, or erythema  Pain well controlled with ibuprofen  Vaginal bleeding is no heavier than a normal period  Patient complains of burning with urination and vulvar itching  Denies bowel issues  Breastfeeding is going well  Denies swelling, severe pelvic pain, abdominal pain, n/v, HA, and fever/chills  [place] depression score of 6  LABS:  N/A    Objective   EXAM:  GENERAL: alert, well appearing, and in no distress and oriented to person, place, and time  VITALS: Blood pressure 114/70, height 4' 11" (1 499 m), weight 83 kg (183 lb), last menstrual period 2018, currently breastfeeding  BMI: Body mass index is 36 96 kg/m²  PELVIC:   VULVA: Nml EGBUS  VAGINA: Normal, no discharge  Introitus well healed, slightly tender  CERVIX: Normal, no discharge  Incision well healed  No swelling, erythema, bleeding, drainage, or tenderness  Assessment/Plan   Diagnoses and all orders for this visit:    S/P primary low transverse     Burning with urination  -     POCT urine dip  -     Urine culture    Vulvar itching  -     Genital Comprehensive Culture    Urine dip positive for blood and leukocyte esterase  Sample sent for culture  Vaginal cultures done; we will call with results  Can continue ibuprofen for pain relief  Call if signs of infection develop    Continue to use abdominal binder as needed  Activity restrictions discussed  No driving until 3 weeks post partum  Call if heavy vaginal bleeding, severe pelvic pain, or other severe symptoms develop  Call lactation consultants if breastfeeding difficulties arise        F/u in 4 weeks for PP exam     Maricruz Valentin PA-C

## 2019-11-19 NOTE — PATIENT INSTRUCTIONS
Continue ibuprofen for pain relief  Call if signs of infection develop  Continue to use abdominal binder as needed  Activity restrictions discussed  No driving until 3 weeks PP  Call if heavy vaginal bleeding, severe pelvic pain, or other severe symptoms develop  Call lactation if breastfeeding difficulties arise

## 2019-11-20 LAB — BACTERIA UR CULT: NORMAL

## 2019-11-21 ENCOUNTER — TELEPHONE (OUTPATIENT)
Dept: OBGYN CLINIC | Facility: CLINIC | Age: 24
End: 2019-11-21

## 2019-11-21 LAB — BACTERIA GENITAL AEROBE CULT: NORMAL

## 2019-12-19 ENCOUNTER — POSTPARTUM VISIT (OUTPATIENT)
Dept: OBGYN CLINIC | Facility: CLINIC | Age: 24
End: 2019-12-19
Payer: COMMERCIAL

## 2019-12-19 VITALS
HEIGHT: 59 IN | SYSTOLIC BLOOD PRESSURE: 122 MMHG | BODY MASS INDEX: 36.97 KG/M2 | DIASTOLIC BLOOD PRESSURE: 74 MMHG | WEIGHT: 183.4 LBS

## 2019-12-19 DIAGNOSIS — O24.410 DIET CONTROLLED GESTATIONAL DIABETES MELLITUS (GDM), ANTEPARTUM: ICD-10-CM

## 2019-12-19 DIAGNOSIS — Z30.019 ENCOUNTER FOR FEMALE BIRTH CONTROL: Primary | ICD-10-CM

## 2019-12-19 PROBLEM — O99.210 OBESITY AFFECTING PREGNANCY, ANTEPARTUM: Status: RESOLVED | Noted: 2019-06-28 | Resolved: 2019-12-19

## 2019-12-19 PROCEDURE — G0145 SCR C/V CYTO,THINLAYER,RESCR: HCPCS | Performed by: OBSTETRICS & GYNECOLOGY

## 2019-12-19 PROCEDURE — 99214 OFFICE O/P EST MOD 30 MIN: CPT | Performed by: OBSTETRICS & GYNECOLOGY

## 2019-12-19 RX ORDER — ACETAMINOPHEN AND CODEINE PHOSPHATE 120; 12 MG/5ML; MG/5ML
1 SOLUTION ORAL DAILY
Qty: 28 TABLET | Refills: 6 | Status: SHIPPED | OUTPATIENT
Start: 2019-12-19

## 2019-12-19 NOTE — PATIENT INSTRUCTIONS
Topic:  6 week postpartum topic:  6 week postpartum visit    Patient and baby both doing well at this time    Patient is breast feeding without difficulty    Baby doing well in gaining weight    Discuss contraceptive options and patient opts for Micronor    Appropriate prescriptions were sent to her pharmacy    Pap smear completed at today's visit    Patient will be seen in 3 months for follow-up oral contraceptive surveillance visit    Patient was given instructions and restrictions    Patient will call for any problems issues or concerns which may arise for her

## 2019-12-19 NOTE — PROGRESS NOTES
OB POSTPARTUM VISIT PROGRESS NOTE  Date of Encounter: 2019    Rena Wen    : 1995  (25 y o )  MR: [id number]    Subjective   Iwona Marcus is in for her postpartum visit  She is now   She delivered by primary  section  She's generally doing well, denies current pain or bleeding issues, and has no significant depression issues  She is breast feeding exclusively   We discussed all appropriate contraceptive options and she chooses [facility]    Postpartum Visit on 2019   Component Date Value    Genital Culture 2019 Mixed Vaginal warren-no growth of Neisseria gonorrhoeae, Group B Streptococcus or clinically significant numbers of yeast, gram negative rods or Gardnerella     LEUKOCYTE ESTERASE,UA 2019 Positive     NITRITE,UA 2019 Negative     SL AMB POCT UROBILINOGEN 2019 Negative     POCT URINE PROTEIN 2019 Negative      PH,UA 2019 5 0     BLOOD,UA 2019 Positive     SPECIFIC GRAVITY,UA 2019 1 015     KETONES,UA 2019 Negative     BILIRUBIN,UA 2019 Negative     GLUCOSE, UA 2019 Negative      COLOR,UA 2019 Yellow     CLARITY,UA 2019 Cloudy     Urine Culture 2019 No Growth <1000 cfu/mL    Admission on 2019, Discharged on 11/10/2019   Component Date Value    ABO Grouping 2019 A     Rh Factor 2019 Positive     Antibody Screen 2019 Negative     Specimen Expiration Date 2019 [id number]     WBC 2019 15 97*    RBC 2019 4 04     Hemoglobin 2019 12 4     Hematocrit 2019 36 9     MCV 2019 91     MCH 2019 30 7     MCHC 2019 33 6     RDW 2019 14 2     Platelets  310     MPV 2019 9 6     RPR 2019 Non-Reactive     POC Glucose 2019 135     POC Glucose 2019 92     POC Glucose 2019 111     POC Glucose 2019 77     POC Glucose 2019 109     POC Glucose 2019 98     POC Glucose 11/07/2019 84     pH, Cord Goran 11/07/2019 7 373     pCO2, Cord Goran 11/07/2019 43 9*    pO2, Cord Goran 11/07/2019 32 0     HCO3, Cord Goran 11/07/2019 25 0     Base Exc, Cord Goran 11/07/2019 -0 4*    O2 Cont, Cord Goran 11/07/2019 13 0     O2 HGB,VENOUS CORD 11/07/2019 75 6     pH, Cord Art 11/07/2019 7 279     pCO2, Cord Art 11/07/2019 59 4     pO2, Cord Art 11/07/2019 13 7     HCO3, Cord Art 11/07/2019 27 2     Base Exc, Cord Art 11/07/2019 -0 5*    O2 Content, Cord Art 11/07/2019 3 8     O2 Hgb, Arterial Cord 11/07/2019 21 9     PLACENTA IN STORAGE 11/07/2019 Placenta Discarded     WBC 11/08/2019 18 99*    RBC 11/08/2019 3 28*    Hemoglobin 11/08/2019 10 1*    Hematocrit 11/08/2019 30 2*    MCV 11/08/2019 92     MCH 11/08/2019 30 8     MCHC 11/08/2019 33 4     RDW 11/08/2019 14 0     Platelets 42/68/9979 280     MPV 11/08/2019 10 0    Pre-Birth Assessment on 10/18/2019   Component Date Value    HIV-1 Antibody 04/23/2019 non-reactive    Routine Prenatal on 10/11/2019   Component Date Value    Strep Grp B PCR 10/11/2019 Negative for Beta Hemolytic Strep Grp B by PCR        Objective   EXAM:  GENERAL: alert, well appearing, and in no distress  VITALS: Blood pressure 122/74, height 4' 11" (1 499 m), weight 83 2 kg (183 lb 6 4 oz), last menstrual period 12/28/2018, currently breastfeeding  BMI: Body mass index is 37 04 kg/m²  NECK/THYROID: WNL  HEART: RRR  LUNGS: Clear to auscultation  BACK: No CVAT  BREASTS: Bilaterally nontender, no masses or nipple discharge  ABDOMEN:SOFT/NT   EXTREMITIES: All normal   PELVIC:   VULVA: Nml EGBUS  VAGINA: Normal, no discharge  Introitus well healed, slightly tender  CERVIX: Normal, no discharge  UTERUS: Anteverted, NSSC, Mobile, NT    RIGHT ADNEXUM: Nontender, no mass  LEFT ADNEXUM: Nontender, no mass  RECTAL: Deferred      Assessment/Plan   Diagnoses and all orders for this visit:    Encounter for female birth control  - norethindrone (MICRONOR) 0 35 MG tablet;  Take 1 tablet (0 35 mg total) by mouth daily    Diet controlled gestational diabetes mellitus (GDM), antepartum  -     GP Glucose, 1hr, PP (Preg) w/Glucola    Encounter for postpartum visit  -     Liquid-based pap, screening        Mayelin Patel MD

## 2019-12-24 LAB
LAB AP GYN PRIMARY INTERPRETATION: NORMAL
Lab: NORMAL

## 2020-01-13 ENCOUNTER — OFFICE VISIT (OUTPATIENT)
Dept: FAMILY MEDICINE CLINIC | Facility: CLINIC | Age: 25
End: 2020-01-13
Payer: COMMERCIAL

## 2020-01-13 VITALS
HEART RATE: 75 BPM | WEIGHT: 182 LBS | BODY MASS INDEX: 36.69 KG/M2 | SYSTOLIC BLOOD PRESSURE: 118 MMHG | DIASTOLIC BLOOD PRESSURE: 72 MMHG | RESPIRATION RATE: 17 BRPM | TEMPERATURE: 97 F | HEIGHT: 59 IN | OXYGEN SATURATION: 98 %

## 2020-01-13 DIAGNOSIS — Z13.0 SCREENING FOR ENDOCRINE, NUTRITIONAL, METABOLIC AND IMMUNITY DISORDER: ICD-10-CM

## 2020-01-13 DIAGNOSIS — Z13.21 SCREENING FOR ENDOCRINE, NUTRITIONAL, METABOLIC AND IMMUNITY DISORDER: ICD-10-CM

## 2020-01-13 DIAGNOSIS — Z13.228 SCREENING FOR ENDOCRINE, NUTRITIONAL, METABOLIC AND IMMUNITY DISORDER: ICD-10-CM

## 2020-01-13 DIAGNOSIS — Z13.29 SCREENING FOR ENDOCRINE, NUTRITIONAL, METABOLIC AND IMMUNITY DISORDER: ICD-10-CM

## 2020-01-13 DIAGNOSIS — H53.9 CHANGE IN VISION: ICD-10-CM

## 2020-01-13 DIAGNOSIS — M25.512 ACUTE PAIN OF LEFT SHOULDER: ICD-10-CM

## 2020-01-13 DIAGNOSIS — E66.09 CLASS 2 OBESITY DUE TO EXCESS CALORIES WITHOUT SERIOUS COMORBIDITY WITH BODY MASS INDEX (BMI) OF 36.0 TO 36.9 IN ADULT: ICD-10-CM

## 2020-01-13 DIAGNOSIS — Z00.00 ANNUAL PHYSICAL EXAM: Primary | ICD-10-CM

## 2020-01-13 PROCEDURE — 3725F SCREEN DEPRESSION PERFORMED: CPT | Performed by: FAMILY MEDICINE

## 2020-01-13 PROCEDURE — 3008F BODY MASS INDEX DOCD: CPT | Performed by: FAMILY MEDICINE

## 2020-01-13 PROCEDURE — 99395 PREV VISIT EST AGE 18-39: CPT | Performed by: FAMILY MEDICINE
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
## 2020-01-13 NOTE — ASSESSMENT & PLAN NOTE
Reports trouble with distance vision since being pregnant  Will refer to optometry for further evaluation and management

## 2020-01-13 NOTE — PATIENT INSTRUCTIONS
Wellness Visit for Adults   AMBULATORY CARE:   A wellness visit  is when you see your healthcare provider to get screened for health problems  You can also get advice on how to stay healthy  Write down your questions so you remember to ask them  Ask your healthcare provider how often you should have a wellness visit  What happens at a wellness visit:  Your healthcare provider will ask about your health, and your family history of health problems  This includes high blood pressure, heart disease, and cancer  He or she will ask if you have symptoms that concern you, if you smoke, and about your mood  You may also be asked about your intake of medicines, supplements, food, and alcohol  Any of the following may be done:  · Your weight  will be checked  Your height may also be checked so your body mass index (BMI) can be calculated  Your BMI shows if you are at a healthy weight  · Your blood pressure  and heart rate will be checked  Your temperature may also be checked  · Blood and urine tests  may be done  Blood tests may be done to check your cholesterol levels  Abnormal cholesterol levels increase your risk for heart disease and stroke  You may also need a blood or urine test to check for diabetes if you are at increased risk  Urine tests may be done to look for signs of an infection or kidney disease  · A physical exam  includes checking your heartbeat and lungs with a stethoscope  Your healthcare provider may also check your skin to look for sun damage  · Screening tests  may be recommended  A screening test is done to check for diseases that may not cause symptoms  The screening tests you may need depend on your age, gender, family history, and lifestyle habits  For example, colorectal screening may be recommended if you are 48years old or older  Screening tests you need if you are a woman:   · A Pap smear  is used to screen for cervical cancer   Pap smears are usually done every 3 to 5 years depending on your age  You may need them more often if you have had abnormal Pap smear test results in the past  Ask your healthcare provider how often you should have a Pap smear  · A mammogram  is an x-ray of your breasts to screen for breast cancer  Experts recommend mammograms every 2 years starting at age 48 years  You may need a mammogram at age 52 years or younger if you have an increased risk for breast cancer  Talk to your healthcare provider about when you should start having mammograms and how often you need them  Vaccines you may need:   · Get an influenza vaccine  every year  The influenza vaccine protects you from the flu  Several types of viruses cause the flu  The viruses change over time, so new vaccines are made each year  · Get a tetanus-diphtheria (Td) booster vaccine  every 10 years  This vaccine protects you against tetanus and diphtheria  Tetanus is a severe infection that may cause painful muscle spasms and lockjaw  Diphtheria is a severe bacterial infection that causes a thick covering in the back of your mouth and throat  · Get a human papillomavirus (HPV) vaccine  if you are female and aged 23 to 32 or male 23 to 24 and never received it  This vaccine protects you from HPV infection  HPV is the most common infection spread by sexual contact  HPV may also cause vaginal, penile, and anal cancers  · Get a pneumococcal vaccine  if you are aged 72 years or older  The pneumococcal vaccine is an injection given to protect you from pneumococcal disease  Pneumococcal disease is an infection caused by pneumococcal bacteria  The infection may cause pneumonia, meningitis, or an ear infection  · Get a shingles vaccine  if you are aged 61 or older, even if you have had shingles before  The shingles vaccine is an injection to protect you from the varicella-zoster virus  This is the same virus that causes chickenpox   Shingles is a painful rash that develops in people who had chickenpox or have been exposed to the virus  How to eat healthy:  My Plate is a model for planning healthy meals  It shows the types and amounts of foods that should go on your plate  Fruits and vegetables make up about half of your plate, and grains and protein make up the other half  A serving of dairy is included on the side of your plate  The amount of calories and serving sizes you need depends on your age, gender, weight, and height  Examples of healthy foods are listed below:  · Eat a variety of vegetables  such as dark green, red, and orange vegetables  You can also include canned vegetables low in sodium (salt) and frozen vegetables without added butter or sauces  · Eat a variety of fresh fruits , canned fruit in 100% juice, frozen fruit, and dried fruit  · Include whole grains  At least half of the grains you eat should be whole grains  Examples include whole-wheat bread, wheat pasta, brown rice, and whole-grain cereals such as oatmeal     · Eat a variety of protein foods such as seafood (fish and shellfish), lean meat, and poultry without skin (turkey and chicken)  Examples of lean meats include pork leg, shoulder, or tenderloin, and beef round, sirloin, tenderloin, and extra lean ground beef  Other protein foods include eggs and egg substitutes, beans, peas, soy products, nuts, and seeds  · Choose low-fat dairy products such as skim or 1% milk or low-fat yogurt, cheese, and cottage cheese  · Limit unhealthy fats  such as butter, hard margarine, and shortening  Exercise:  Exercise at least 30 minutes per day on most days of the week  Some examples of exercise include walking, biking, dancing, and swimming  You can also fit in more physical activity by taking the stairs instead of the elevator or parking farther away from stores  Include muscle strengthening activities 2 days each week  Regular exercise provides many health benefits   It helps you manage your weight, and decreases your risk for type 2 diabetes, heart disease, stroke, and high blood pressure  Exercise can also help improve your mood  Ask your healthcare provider about the best exercise plan for you  General health and safety guidelines:   · Do not smoke  Nicotine and other chemicals in cigarettes and cigars can cause lung damage  Ask your healthcare provider for information if you currently smoke and need help to quit  E-cigarettes or smokeless tobacco still contain nicotine  Talk to your healthcare provider before you use these products  · Limit alcohol  A drink of alcohol is 12 ounces of beer, 5 ounces of wine, or 1½ ounces of liquor  · Lose weight, if needed  Being overweight increases your risk of certain health conditions  These include heart disease, high blood pressure, type 2 diabetes, and certain types of cancer  · Protect your skin  Do not sunbathe or use tanning beds  Use sunscreen with a SPF 15 or higher  Apply sunscreen at least 15 minutes before you go outside  Reapply sunscreen every 2 hours  Wear protective clothing, hats, and sunglasses when you are outside  · Drive safely  Always wear your seatbelt  Make sure everyone in your car wears a seatbelt  A seatbelt can save your life if you are in an accident  Do not use your cell phone when you are driving  This could distract you and cause an accident  Pull over if you need to make a call or send a text message  · Practice safe sex  Use latex condoms if are sexually active and have more than one partner  Your healthcare provider may recommend screening tests for sexually transmitted infections (STIs)  · Wear helmets, lifejackets, and protective gear  Always wear a helmet when you ride a bike or motorcycle, go skiing, or play sports that could cause a head injury  Wear protective equipment when you play sports  Wear a lifejacket when you are on a boat or doing water sports    © 2017 [employer] Information is for End User's use only and may not be sold, redistributed or otherwise used for commercial purposes  All illustrations and images included in CareNotes® are the copyrighted property of [employer]  or [employer]ne  The above information is an  only  It is not intended as medical advice for individual conditions or treatments  Talk to your doctor, nurse or pharmacist before following any medical regimen to see if it is safe and effective for you  Obesity   AMBULATORY CARE:   Obesity  is when your body mass index (BMI) is greater than 30  Your healthcare provider will use your height and weight to measure your BMI  The risks of obesity include  many health problems, such as injuries or physical disability  You may need tests to check for the following:  · Diabetes     · High blood pressure or high cholesterol     · Heart disease     · Gallbladder or liver disease     · Cancer of the colon, breast, prostate, liver, or kidney     · Sleep apnea     · Arthritis or gout  Seek care immediately if:   · You have a severe headache, confusion, or difficulty speaking  · You have weakness on one side of your body  · You have chest pain, sweating, or shortness of breath  Contact your healthcare provider if:   · You have symptoms of gallbladder or liver disease, such as pain in your upper abdomen  · You have knee or hip pain and discomfort while walking  · You have symptoms of diabetes, such as intense hunger and thirst, and frequent urination  · You have symptoms of sleep apnea, such as snoring or daytime sleepiness  · You have questions or concerns about your condition or care  Treatment for obesity  focuses on helping you lose weight to improve your health  Even a small decrease in BMI can reduce the risk for many health problems  Your healthcare provider will help you set a weight-loss goal   · Lifestyle changes  are the first step in treating obesity   These include making healthy food choices and getting regular physical activity  Your healthcare provider may suggest a weight-loss program that involves coaching, education, and therapy  · Medicine  may help you lose weight when it is used with a healthy diet and physical activity  · Surgery  can help you lose weight if you are very obese and have other health problems  There are several types of weight-loss surgery  Ask your healthcare provider for more information  Be successful losing weight:   · Set small, realistic goals  An example of a small goal is to walk for 20 minutes 5 days a week  Anther goal is to lose 5% of your body weight  · Tell friends, family members, and coworkers about your goals  and ask for their support  Ask a friend to lose weight with you, or join a weight-loss support group  · Identify foods or triggers that may cause you to overeat , and find ways to avoid them  Remove tempting high-calorie foods from your home and workplace  Place a bowl of fresh fruit on your kitchen counter  If stress causes you to eat, then find other ways to cope with stress  · Keep a diary to track what you eat and drink  Also write down how many minutes of physical activity you do each day  Weigh yourself once a week and record it in your diary  Eating changes: You will need to eat 500 to 1,000 fewer calories each day than you currently eat to lose 1 to 2 pounds a week  The following changes will help you cut calories:  · Eat smaller portions  Use small plates, no larger than 9 inches in diameter  Fill your plate half full of fruits and vegetables  Measure your food using measuring cups until you know what a serving size looks like  · Eat 3 meals and 1 or 2 snacks each day  Plan your meals in advance  [place] and eat at home most of the time  Eat slowly  · Eat fruits and vegetables at every meal   They are low in calories and high in fiber, which makes you feel full   Do not add butter, margarine, or cream sauce to vegetables  Use herbs to season steamed vegetables  · Eat less fat and fewer fried foods  Eat more baked or grilled chicken and fish  These protein sources are lower in calories and fat than red meat  Limit fast food  Dress your salads with olive oil and vinegar instead of bottled dressing  · Limit the amount of sugar you eat  Do not drink sugary beverages  Limit alcohol  Activity changes:  Physical activity is good for your body in many ways  It helps you burn calories and build strong muscles  It decreases stress and depression, and improves your mood  It can also help you sleep better  Talk to your healthcare provider before you begin an exercise program   · Exercise for at least 30 minutes 5 days a week  Start slowly  Set aside time each day for physical activity that you enjoy and that is convenient for you  It is best to do both weight training and an activity that increases your heart rate, such as walking, bicycling, or swimming  · Find ways to be more active  Do yard work and housecleaning  Walk up the stairs instead of using elevators  Spend your leisure time going to events that require walking, such as outdoor festivals or fairs  This extra physical activity can help you lose weight and keep it off  Follow up with your healthcare provider as directed: You may need to meet with a dietitian  Write down your questions so you remember to ask them during your visits  © 2017 [employer] Information is for End User's use only and may not be sold, redistributed or otherwise used for commercial purposes  All illustrations and images included in CareNotes® are the copyrighted property of [employer]  or [employer]  The above information is an  only  It is not intended as medical advice for individual conditions or treatments   Talk to your doctor, nurse or pharmacist before following any medical regimen to see if it is safe and effective for you

## 2020-01-13 NOTE — ASSESSMENT & PLAN NOTE
Patient describes intermittent muscle pain of the left posterior shoulder  She has been treating with OTC ibuprofen and massages  Recommend adding heat therapy 20 minutes three times a day  If symptoms persist, may consider referral to PT for further evaluation and management

## 2020-06-14 ENCOUNTER — OFFICE VISIT (OUTPATIENT)
Dept: URGENT CARE | Facility: MEDICAL CENTER | Age: 25
End: 2020-06-14

## 2020-06-14 VITALS
WEIGHT: 185 LBS | HEART RATE: 93 BPM | TEMPERATURE: 99.3 F | HEIGHT: 59 IN | DIASTOLIC BLOOD PRESSURE: 75 MMHG | BODY MASS INDEX: 37.29 KG/M2 | SYSTOLIC BLOOD PRESSURE: 116 MMHG | RESPIRATION RATE: 16 BRPM | OXYGEN SATURATION: 100 %

## 2020-06-14 DIAGNOSIS — H60.502 ACUTE OTITIS EXTERNA OF LEFT EAR, UNSPECIFIED TYPE: Primary | ICD-10-CM

## 2020-06-14 PROCEDURE — G0382 LEV 3 HOSP TYPE B ED VISIT: HCPCS | Performed by: PHYSICIAN ASSISTANT

## 2020-06-14 RX ORDER — CIPROFLOXACIN AND DEXAMETHASONE 3; 1 MG/ML; MG/ML
4 SUSPENSION/ DROPS AURICULAR (OTIC) 2 TIMES DAILY
Qty: 7.5 ML | Refills: 0 | Status: SHIPPED | OUTPATIENT
Start: 2020-06-14 | End: 2020-06-21

## 2020-12-29 NOTE — PROGRESS NOTES
Assessment     Pregnancy 36 and 4/7 weeks     Plan     28-week labs reviewed, abnormal  Stressed kick counts  Follow up in twice weekly  Improve adherence to diabetic diet; continue to monitor blood sugars  F/u with [facility] as planned  Labor precautions discussed  F/u with PT as planned  Ebb Ari is a 21 y o  female being seen today for her obstetrical visit  She is at 36w4d gestation  Patient reports backache, no bleeding, no contractions, no cramping and no leaking  Fetal movement: normal   Patient states she is doing well overall  Follows with PT for back pain  Blood sugars are 80s-90s in the AM, and 110s after eating  Patient admits that she is not always adhering to diabetic diet  GBS negative  Has next growth U/S at [facility] next week  NST today reactive  BPP 10/10; ZAKI 20 40  Denies HA, n/v, reflux, abdominal pain, and swelling  Menstrual History:  OB History        1    Para        Term                AB        Living           SAB        TAB        Ectopic        Multiple        Live Births                    Menarche age: N/A  Patient's last menstrual period was 2018  The following portions of the patient's history were reviewed and updated as appropriate: allergies, current medications, past family history, past medical history, past social history, past surgical history and problem list     Review of Systems  Pertinent items are noted in HPI       Objective     /74   Pulse 92   Wt 89 1 kg (196 lb 6 4 oz)   LMP 2018   BMI 39 67 kg/m²   FHT:  143 BPM   Uterine Size: size equals dates   Presentation: cephalic See Summary Low Acute Suicide Risk

## 2021-01-13 ENCOUNTER — TELEPHONE (OUTPATIENT)
Dept: FAMILY MEDICINE CLINIC | Facility: CLINIC | Age: 26
End: 2021-01-13

## 2021-03-03 ENCOUNTER — TELEPHONE (OUTPATIENT)
Dept: FAMILY MEDICINE CLINIC | Facility: CLINIC | Age: 26
End: 2021-03-03

## 2021-06-16 ENCOUNTER — TELEPHONE (OUTPATIENT)
Dept: FAMILY MEDICINE CLINIC | Facility: CLINIC | Age: 26
End: 2021-06-16

## 2022-01-22 ENCOUNTER — OFFICE VISIT (OUTPATIENT)
Dept: URGENT CARE | Age: 27
End: 2022-01-22
Payer: MEDICARE

## 2022-01-22 VITALS
SYSTOLIC BLOOD PRESSURE: 130 MMHG | HEART RATE: 90 BPM | RESPIRATION RATE: 20 BRPM | TEMPERATURE: 98 F | OXYGEN SATURATION: 100 % | WEIGHT: 202 LBS | HEIGHT: 59 IN | DIASTOLIC BLOOD PRESSURE: 60 MMHG | BODY MASS INDEX: 40.72 KG/M2

## 2022-01-22 DIAGNOSIS — H10.32 ACUTE BACTERIAL CONJUNCTIVITIS OF LEFT EYE: Primary | ICD-10-CM

## 2022-01-22 PROCEDURE — 99213 OFFICE O/P EST LOW 20 MIN: CPT

## 2022-01-22 RX ORDER — POLYMYXIN B SULFATE AND TRIMETHOPRIM 1; 10000 MG/ML; [USP'U]/ML
1 SOLUTION OPHTHALMIC EVERY 6 HOURS
Qty: 10 ML | Refills: 0 | Status: SHIPPED | OUTPATIENT
Start: 2022-01-22 | End: 2022-01-29
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
## 2022-01-23 NOTE — PATIENT INSTRUCTIONS
Conjunctivitis   Antibiotic drops as directed  Follow up with PCP in 3-5 days  Proceed to ER if symptoms worsen  AMBULATORY CARE:   Conjunctivitis,  or pink eye, is inflammation of your conjunctiva  The conjunctiva is a thin tissue that covers the front of your eye and the back of your eyelids  The conjunctiva helps protect your eye and keep it moist  Conjunctivitis may be caused by bacteria, allergies, or a virus  If your conjunctivitis is caused by bacteria, it may get better on its own in about 7 days  Viral conjunctivitis can last up to 3 weeks  Common symptoms may include any of the following: You will usually have symptoms in both eyes if your conjunctivitis is caused by allergies  You may also have other allergic symptoms, such as a rash or runny nose  Symptoms will usually start in 1 eye if your conjunctivitis is caused by a virus or bacteria  · Redness in the whites of your eye    · Itching in your eye or around your eye    · Feeling like there is something in your eye    · Watery or thick, sticky discharge    · Crusty eyelids when you wake up in the morning    · Burning, stinging, or swelling in your eye    · Pain when you see bright light    Seek care immediately if:   · You have worsening eye pain  · The swelling in your eye gets worse, even after treatment  · Your vision suddenly becomes worse or you cannot see at all  Contact your healthcare provider if:   · You develop a fever and ear pain  · You have tiny bumps or spots of blood on your eye  · You have questions or concerns about your condition or care  Treatment  will depend on the cause of your conjunctivitis  You may need antibiotics or allergy medicine as a pill, eye drop, or eye ointment  Manage your symptoms:   · Apply a cool compress  Wet a washcloth with cold water and place it on your eye  This will help decrease itching and irritation  · Do not wear contact lenses  They can irritate your eye   Throw away the pair you are using and ask when you can wear them again  Use a new pair of lenses when your healthcare provider says it is okay  · Avoid irritants  Stay away from smoke filled areas  Shield your eyes from wind and sun  · Flush your eye  You may need to flush your eye with saline to help decrease your symptoms  Ask for more information on how to flush your eye  Medicines:  Treatment depends on what is causing your conjunctivitis  You may be given any of the following:  · Allergy medicine  helps decrease itchy, red, swollen eyes caused by allergies  It may be given as a pill, eye drops, or nasal spray  · Antibiotics  may be needed if your conjunctivitis is caused by bacteria  This medicine may be given as a pill, eye drops, or eye ointment  · Take your medicine as directed  Contact your healthcare provider if you think your medicine is not helping or if you have side effects  Tell him or her if you are allergic to any medicine  Keep a list of the medicines, vitamins, and herbs you take  Include the amounts, and when and why you take them  Bring the list or the pill bottles to follow-up visits  Carry your medicine list with you in case of an emergency  Prevent the spread of conjunctivitis:   · Wash your hands with soap and water often  Wash your hands before and after you touch your eyes  Also wash your hands before you prepare or eat food and after you use the bathroom or change a diaper  · Avoid allergens  Try to avoid the things that cause your allergies, such as pets, dust, or grass  · Avoid contact with others  Do not share towels or washcloths  Try to stay away from others as much as possible  Ask when you can return to work or school  · Throw away eye makeup  The bacteria that caused your conjunctivitis can stay in eye makeup  Throw away mascara and other eye makeup      © Copyright [employer] 2021 Information is for End User's use only and may not be sold, redistributed or otherwise used for commercial purposes  All illustrations and images included in CareNotes® are the copyrighted property of A D A M [employer] Inc  or Galina Llamas  The above information is an  only  It is not intended as medical advice for individual conditions or treatments  Talk to your doctor, nurse or pharmacist before following any medical regimen to see if it is safe and effective for you

## 2024-05-07 ENCOUNTER — APPOINTMENT (EMERGENCY)
Dept: CT IMAGING | Facility: HOSPITAL | Age: 29
End: 2024-05-07
Payer: MEDICARE

## 2024-05-07 ENCOUNTER — HOSPITAL ENCOUNTER (OUTPATIENT)
Facility: HOSPITAL | Age: 29
Setting detail: OUTPATIENT SURGERY
Discharge: HOME | End: 2024-05-08
Attending: EMERGENCY MEDICINE | Admitting: SURGERY
Payer: MEDICARE

## 2024-05-07 ENCOUNTER — ANESTHESIA EVENT (OUTPATIENT)
Dept: PERIOP | Facility: HOSPITAL | Age: 29
End: 2024-05-07
Payer: MEDICARE

## 2024-05-07 ENCOUNTER — ANESTHESIA (OUTPATIENT)
Dept: PERIOP | Facility: HOSPITAL | Age: 29
End: 2024-05-07
Payer: MEDICARE

## 2024-05-07 DIAGNOSIS — K81.0 ACUTE CHOLECYSTITIS: Primary | ICD-10-CM

## 2024-05-07 LAB
ALBUMIN SERPL BCP-MCNC: 4.2 G/DL (ref 3.5–5)
ALP SERPL-CCNC: 86 U/L (ref 34–104)
ALT SERPL W P-5'-P-CCNC: 35 U/L (ref 7–52)
ANION GAP SERPL CALCULATED.3IONS-SCNC: 7 MMOL/L (ref 4–13)
AST SERPL W P-5'-P-CCNC: 21 U/L (ref 13–39)
BACTERIA UR QL AUTO: NORMAL /HPF
BASOPHILS # BLD AUTO: 0.05 THOUSANDS/ÂΜL (ref 0–0.1)
BASOPHILS NFR BLD AUTO: 0 % (ref 0–1)
BILIRUB SERPL-MCNC: 0.25 MG/DL (ref 0.2–1)
BILIRUB UR QL STRIP: NEGATIVE
BUN SERPL-MCNC: 11 MG/DL (ref 5–25)
CALCIUM SERPL-MCNC: 9.4 MG/DL (ref 8.4–10.2)
CHLORIDE SERPL-SCNC: 104 MMOL/L (ref 96–108)
CLARITY UR: CLEAR
CO2 SERPL-SCNC: 25 MMOL/L (ref 21–32)
COLOR UR: YELLOW
CREAT SERPL-MCNC: 0.66 MG/DL (ref 0.6–1.3)
EOSINOPHIL # BLD AUTO: 0.17 THOUSAND/ÂΜL (ref 0–0.61)
EOSINOPHIL NFR BLD AUTO: 1 % (ref 0–6)
ERYTHROCYTE [DISTWIDTH] IN BLOOD BY AUTOMATED COUNT: 13.8 % (ref 11.6–15.1)
EXT PREGNANCY TEST URINE: NEGATIVE
EXT. CONTROL: NORMAL
GFR SERPL CREATININE-BSD FRML MDRD: 120 ML/MIN/1.73SQ M
GLUCOSE SERPL-MCNC: 107 MG/DL (ref 65–140)
GLUCOSE UR STRIP-MCNC: NEGATIVE MG/DL
HCT VFR BLD AUTO: 39.4 % (ref 34.8–46.1)
HGB BLD-MCNC: 13.3 G/DL (ref 11.5–15.4)
HGB UR QL STRIP.AUTO: ABNORMAL
IMM GRANULOCYTES # BLD AUTO: 0.05 THOUSAND/UL (ref 0–0.2)
IMM GRANULOCYTES NFR BLD AUTO: 0 % (ref 0–2)
KETONES UR STRIP-MCNC: NEGATIVE MG/DL
LEUKOCYTE ESTERASE UR QL STRIP: NEGATIVE
LIPASE SERPL-CCNC: 33 U/L (ref 11–82)
LYMPHOCYTES # BLD AUTO: 2.08 THOUSANDS/ÂΜL (ref 0.6–4.47)
LYMPHOCYTES NFR BLD AUTO: 13 % (ref 14–44)
MCH RBC QN AUTO: 28.9 PG (ref 26.8–34.3)
MCHC RBC AUTO-ENTMCNC: 33.8 G/DL (ref 31.4–37.4)
MCV RBC AUTO: 86 FL (ref 82–98)
MONOCYTES # BLD AUTO: 0.87 THOUSAND/ÂΜL (ref 0.17–1.22)
MONOCYTES NFR BLD AUTO: 6 % (ref 4–12)
NEUTROPHILS # BLD AUTO: 12.27 THOUSANDS/ÂΜL (ref 1.85–7.62)
NEUTS SEG NFR BLD AUTO: 80 % (ref 43–75)
NITRITE UR QL STRIP: NEGATIVE
NON-SQ EPI CELLS URNS QL MICRO: NORMAL /HPF
NRBC BLD AUTO-RTO: 0 /100 WBCS
PH UR STRIP.AUTO: 5.5 [PH] (ref 4.5–8)
PLATELET # BLD AUTO: 420 THOUSANDS/UL (ref 149–390)
PMV BLD AUTO: 9.4 FL (ref 8.9–12.7)
POTASSIUM SERPL-SCNC: 3.8 MMOL/L (ref 3.5–5.3)
PROT SERPL-MCNC: 7.3 G/DL (ref 6.4–8.4)
PROT UR STRIP-MCNC: NEGATIVE MG/DL
RBC # BLD AUTO: 4.61 MILLION/UL (ref 3.81–5.12)
RBC #/AREA URNS AUTO: NORMAL /HPF
SODIUM SERPL-SCNC: 136 MMOL/L (ref 135–147)
SP GR UR STRIP.AUTO: 1.01 (ref 1–1.03)
UROBILINOGEN UR QL STRIP.AUTO: 0.2 E.U./DL
WBC # BLD AUTO: 15.49 THOUSAND/UL (ref 4.31–10.16)
WBC #/AREA URNS AUTO: NORMAL /HPF

## 2024-05-07 PROCEDURE — 96361 HYDRATE IV INFUSION ADD-ON: CPT

## 2024-05-07 PROCEDURE — 80053 COMPREHEN METABOLIC PANEL: CPT | Performed by: EMERGENCY MEDICINE

## 2024-05-07 PROCEDURE — 96375 TX/PRO/DX INJ NEW DRUG ADDON: CPT

## 2024-05-07 PROCEDURE — 36415 COLL VENOUS BLD VENIPUNCTURE: CPT | Performed by: EMERGENCY MEDICINE

## 2024-05-07 PROCEDURE — 99291 CRITICAL CARE FIRST HOUR: CPT | Performed by: EMERGENCY MEDICINE

## 2024-05-07 PROCEDURE — 85025 COMPLETE CBC W/AUTO DIFF WBC: CPT | Performed by: EMERGENCY MEDICINE

## 2024-05-07 PROCEDURE — 99284 EMERGENCY DEPT VISIT MOD MDM: CPT

## 2024-05-07 PROCEDURE — 74177 CT ABD & PELVIS W/CONTRAST: CPT

## 2024-05-07 PROCEDURE — 81025 URINE PREGNANCY TEST: CPT | Performed by: EMERGENCY MEDICINE

## 2024-05-07 PROCEDURE — 96365 THER/PROPH/DIAG IV INF INIT: CPT

## 2024-05-07 PROCEDURE — 83690 ASSAY OF LIPASE: CPT | Performed by: EMERGENCY MEDICINE

## 2024-05-07 PROCEDURE — 81001 URINALYSIS AUTO W/SCOPE: CPT

## 2024-05-07 PROCEDURE — 47562 LAPAROSCOPIC CHOLECYSTECTOMY: CPT | Performed by: SURGERY

## 2024-05-07 PROCEDURE — 88304 TISSUE EXAM BY PATHOLOGIST: CPT | Performed by: PATHOLOGY

## 2024-05-07 PROCEDURE — 99222 1ST HOSP IP/OBS MODERATE 55: CPT | Performed by: SURGERY

## 2024-05-07 RX ORDER — HYDROMORPHONE HCL/PF 1 MG/ML
0.5 SYRINGE (ML) INJECTION
Status: DISCONTINUED | OUTPATIENT
Start: 2024-05-07 | End: 2024-05-07 | Stop reason: HOSPADM

## 2024-05-07 RX ORDER — BUPIVACAINE HYDROCHLORIDE 5 MG/ML
INJECTION, SOLUTION EPIDURAL; INTRACAUDAL AS NEEDED
Status: DISCONTINUED | OUTPATIENT
Start: 2024-05-07 | End: 2024-05-07 | Stop reason: HOSPADM

## 2024-05-07 RX ORDER — FAMOTIDINE 10 MG/ML
20 INJECTION, SOLUTION INTRAVENOUS ONCE
Status: COMPLETED | OUTPATIENT
Start: 2024-05-07 | End: 2024-05-07

## 2024-05-07 RX ORDER — SODIUM CHLORIDE 9 MG/ML
125 INJECTION, SOLUTION INTRAVENOUS CONTINUOUS
Status: DISCONTINUED | OUTPATIENT
Start: 2024-05-07 | End: 2024-05-08

## 2024-05-07 RX ORDER — DEXMEDETOMIDINE HYDROCHLORIDE 100 UG/ML
INJECTION, SOLUTION INTRAVENOUS AS NEEDED
Status: DISCONTINUED | OUTPATIENT
Start: 2024-05-07 | End: 2024-05-07

## 2024-05-07 RX ORDER — ROCURONIUM BROMIDE 10 MG/ML
INJECTION, SOLUTION INTRAVENOUS AS NEEDED
Status: DISCONTINUED | OUTPATIENT
Start: 2024-05-07 | End: 2024-05-07

## 2024-05-07 RX ORDER — GLYCOPYRROLATE 0.2 MG/ML
INJECTION INTRAMUSCULAR; INTRAVENOUS AS NEEDED
Status: DISCONTINUED | OUTPATIENT
Start: 2024-05-07 | End: 2024-05-07

## 2024-05-07 RX ORDER — CEFTRIAXONE 1 G/50ML
1000 INJECTION, SOLUTION INTRAVENOUS ONCE
Status: DISCONTINUED | OUTPATIENT
Start: 2024-05-07 | End: 2024-05-07

## 2024-05-07 RX ORDER — ONDANSETRON 2 MG/ML
INJECTION INTRAMUSCULAR; INTRAVENOUS AS NEEDED
Status: DISCONTINUED | OUTPATIENT
Start: 2024-05-07 | End: 2024-05-07

## 2024-05-07 RX ORDER — PHENYLEPHRINE HCL IN 0.9% NACL 1 MG/10 ML
SYRINGE (ML) INTRAVENOUS AS NEEDED
Status: DISCONTINUED | OUTPATIENT
Start: 2024-05-07 | End: 2024-05-07

## 2024-05-07 RX ORDER — OXYCODONE HYDROCHLORIDE 5 MG/1
5 TABLET ORAL EVERY 4 HOURS PRN
Status: DISCONTINUED | OUTPATIENT
Start: 2024-05-07 | End: 2024-05-08 | Stop reason: HOSPADM

## 2024-05-07 RX ORDER — FENTANYL CITRATE 50 UG/ML
INJECTION, SOLUTION INTRAMUSCULAR; INTRAVENOUS AS NEEDED
Status: DISCONTINUED | OUTPATIENT
Start: 2024-05-07 | End: 2024-05-07

## 2024-05-07 RX ORDER — MAGNESIUM HYDROXIDE 1200 MG/15ML
LIQUID ORAL AS NEEDED
Status: DISCONTINUED | OUTPATIENT
Start: 2024-05-07 | End: 2024-05-07 | Stop reason: HOSPADM

## 2024-05-07 RX ORDER — PROPOFOL 10 MG/ML
INJECTION, EMULSION INTRAVENOUS AS NEEDED
Status: DISCONTINUED | OUTPATIENT
Start: 2024-05-07 | End: 2024-05-07

## 2024-05-07 RX ORDER — CEFAZOLIN SODIUM 2 G/50ML
SOLUTION INTRAVENOUS AS NEEDED
Status: DISCONTINUED | OUTPATIENT
Start: 2024-05-07 | End: 2024-05-07

## 2024-05-07 RX ORDER — OXYCODONE HYDROCHLORIDE 10 MG/1
10 TABLET ORAL EVERY 4 HOURS PRN
Status: DISCONTINUED | OUTPATIENT
Start: 2024-05-07 | End: 2024-05-08 | Stop reason: HOSPADM

## 2024-05-07 RX ORDER — KETOROLAC TROMETHAMINE 30 MG/ML
15 INJECTION, SOLUTION INTRAMUSCULAR; INTRAVENOUS ONCE
Status: COMPLETED | OUTPATIENT
Start: 2024-05-07 | End: 2024-05-07

## 2024-05-07 RX ORDER — MAGNESIUM HYDROXIDE/ALUMINUM HYDROXICE/SIMETHICONE 120; 1200; 1200 MG/30ML; MG/30ML; MG/30ML
30 SUSPENSION ORAL ONCE
Status: COMPLETED | OUTPATIENT
Start: 2024-05-07 | End: 2024-05-07

## 2024-05-07 RX ORDER — DEXAMETHASONE SODIUM PHOSPHATE 10 MG/ML
INJECTION, SOLUTION INTRAMUSCULAR; INTRAVENOUS AS NEEDED
Status: DISCONTINUED | OUTPATIENT
Start: 2024-05-07 | End: 2024-05-07

## 2024-05-07 RX ORDER — ALBUTEROL SULFATE 90 UG/1
AEROSOL, METERED RESPIRATORY (INHALATION) AS NEEDED
Status: DISCONTINUED | OUTPATIENT
Start: 2024-05-07 | End: 2024-05-07

## 2024-05-07 RX ORDER — ONDANSETRON 2 MG/ML
4 INJECTION INTRAMUSCULAR; INTRAVENOUS ONCE
Status: COMPLETED | OUTPATIENT
Start: 2024-05-07 | End: 2024-05-07

## 2024-05-07 RX ORDER — SODIUM CHLORIDE, SODIUM LACTATE, POTASSIUM CHLORIDE, CALCIUM CHLORIDE 600; 310; 30; 20 MG/100ML; MG/100ML; MG/100ML; MG/100ML
100 INJECTION, SOLUTION INTRAVENOUS CONTINUOUS
Status: DISCONTINUED | OUTPATIENT
Start: 2024-05-07 | End: 2024-05-08

## 2024-05-07 RX ORDER — OXYCODONE HYDROCHLORIDE 5 MG/1
5 TABLET ORAL EVERY 6 HOURS PRN
Qty: 10 TABLET | Refills: 0 | Status: SHIPPED | OUTPATIENT
Start: 2024-05-07 | End: 2024-05-08

## 2024-05-07 RX ORDER — ENOXAPARIN SODIUM 100 MG/ML
40 INJECTION SUBCUTANEOUS DAILY
Status: DISCONTINUED | OUTPATIENT
Start: 2024-05-07 | End: 2024-05-08 | Stop reason: HOSPADM

## 2024-05-07 RX ORDER — ONDANSETRON 2 MG/ML
4 INJECTION INTRAMUSCULAR; INTRAVENOUS ONCE AS NEEDED
Status: DISCONTINUED | OUTPATIENT
Start: 2024-05-07 | End: 2024-05-07 | Stop reason: HOSPADM

## 2024-05-07 RX ORDER — METRONIDAZOLE 500 MG/100ML
500 INJECTION, SOLUTION INTRAVENOUS EVERY 8 HOURS
Status: DISCONTINUED | OUTPATIENT
Start: 2024-05-07 | End: 2024-05-07

## 2024-05-07 RX ORDER — LIDOCAINE HYDROCHLORIDE 10 MG/ML
INJECTION, SOLUTION EPIDURAL; INFILTRATION; INTRACAUDAL; PERINEURAL AS NEEDED
Status: DISCONTINUED | OUTPATIENT
Start: 2024-05-07 | End: 2024-05-07

## 2024-05-07 RX ADMIN — FENTANYL CITRATE 100 MCG: 50 INJECTION INTRAMUSCULAR; INTRAVENOUS at 16:37

## 2024-05-07 RX ADMIN — SODIUM CHLORIDE, SODIUM LACTATE, POTASSIUM CHLORIDE, AND CALCIUM CHLORIDE 100 ML/HR: .6; .31; .03; .02 INJECTION, SOLUTION INTRAVENOUS at 14:10

## 2024-05-07 RX ADMIN — SODIUM CHLORIDE 125 ML/HR: 0.9 INJECTION, SOLUTION INTRAVENOUS at 16:14

## 2024-05-07 RX ADMIN — ALBUTEROL SULFATE 5 PUFF: 90 AEROSOL, METERED RESPIRATORY (INHALATION) at 16:45

## 2024-05-07 RX ADMIN — DEXAMETHASONE SODIUM PHOSPHATE 10 MG: 10 INJECTION INTRAMUSCULAR; INTRAVENOUS at 16:42

## 2024-05-07 RX ADMIN — FAMOTIDINE 20 MG: 10 INJECTION INTRAVENOUS at 08:44

## 2024-05-07 RX ADMIN — PROPOFOL 250 MG: 10 INJECTION, EMULSION INTRAVENOUS at 16:37

## 2024-05-07 RX ADMIN — LIDOCAINE HYDROCHLORIDE 100 MG: 10 INJECTION, SOLUTION EPIDURAL; INFILTRATION; INTRACAUDAL; PERINEURAL at 16:37

## 2024-05-07 RX ADMIN — SODIUM CHLORIDE 1000 ML: 0.9 INJECTION, SOLUTION INTRAVENOUS at 08:36

## 2024-05-07 RX ADMIN — Medication 100 MCG: at 17:00

## 2024-05-07 RX ADMIN — ALBUTEROL SULFATE 4 PUFF: 90 AEROSOL, METERED RESPIRATORY (INHALATION) at 17:50

## 2024-05-07 RX ADMIN — ALUMINUM HYDROXIDE, MAGNESIUM HYDROXIDE, DIMETHICONE 30 ML: 400; 400; 40 SUSPENSION ORAL at 08:44

## 2024-05-07 RX ADMIN — FENTANYL CITRATE 50 MCG: 50 INJECTION INTRAMUSCULAR; INTRAVENOUS at 17:37

## 2024-05-07 RX ADMIN — OXYCODONE 5 MG: 5 TABLET ORAL at 20:02

## 2024-05-07 RX ADMIN — ROCURONIUM BROMIDE 50 MG: 10 INJECTION, SOLUTION INTRAVENOUS at 16:37

## 2024-05-07 RX ADMIN — CEFAZOLIN SODIUM 2000 MG: 2 SOLUTION INTRAVENOUS at 16:54

## 2024-05-07 RX ADMIN — Medication 100 MCG: at 17:14

## 2024-05-07 RX ADMIN — IOHEXOL 100 ML: 350 INJECTION, SOLUTION INTRAVENOUS at 11:14

## 2024-05-07 RX ADMIN — SODIUM CHLORIDE: 0.9 INJECTION, SOLUTION INTRAVENOUS at 18:08

## 2024-05-07 RX ADMIN — DEXMEDETOMIDINE HCL 12 MCG: 100 INJECTION INTRAVENOUS at 16:50

## 2024-05-07 RX ADMIN — ONDANSETRON 4 MG: 2 INJECTION INTRAMUSCULAR; INTRAVENOUS at 08:43

## 2024-05-07 RX ADMIN — GLYCOPYRROLATE 0.2 MG: 0.2 INJECTION, SOLUTION INTRAMUSCULAR; INTRAVENOUS at 16:42

## 2024-05-07 RX ADMIN — Medication 100 MCG: at 17:17

## 2024-05-07 RX ADMIN — ONDANSETRON 4 MG: 2 INJECTION INTRAMUSCULAR; INTRAVENOUS at 16:42

## 2024-05-07 RX ADMIN — KETOROLAC TROMETHAMINE 15 MG: 30 INJECTION, SOLUTION INTRAMUSCULAR at 09:32

## 2024-05-07 RX ADMIN — SUGAMMADEX 200 MG: 100 INJECTION, SOLUTION INTRAVENOUS at 17:44

## 2024-05-07 RX ADMIN — PIPERACILLIN SODIUM AND TAZOBACTAM SODIUM 4.5 G: 36; 4.5 INJECTION, POWDER, LYOPHILIZED, FOR SOLUTION INTRAVENOUS at 12:06

## 2024-05-07 NOTE — Clinical Note
Case was discussed with surgery resident and the patient's admission status was agreed to be Admission Status: inpatient status to the service of Dr. Camacho.
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
## 2024-05-07 NOTE — PLAN OF CARE
Problem: PAIN - ADULT  Goal: Verbalizes/displays adequate comfort level or baseline comfort level  Description: Interventions:  - Encourage patient to monitor pain and request assistance  - Assess pain using appropriate pain scale  - Administer analgesics based on type and severity of pain and evaluate response  - Implement non-pharmacological measures as appropriate and evaluate response  - Consider cultural and social influences on pain and pain management  - Notify physician/advanced practitioner if interventions unsuccessful or patient reports new pain  Outcome: Progressing     Problem: INFECTION - ADULT  Goal: Absence or prevention of progression during hospitalization  Description: INTERVENTIONS:  - Assess and monitor for signs and symptoms of infection  - Monitor lab/diagnostic results  - Monitor all insertion sites, i.e. indwelling lines, tubes, and drains  - Monitor endotracheal if appropriate and nasal secretions for changes in amount and color  - Gr[place] appropriate cooling/warming therapies per order  - Administer medications as ordered  - Instruct and encourage patient and family to use good hand hygiene technique  - Identify and instruct in appropriate isolation precautions for identified infection/condition  Outcome: Progressing     Problem: SAFETY ADULT  Goal: Patient will remain free of falls  Description: INTERVENTIONS:  - Educate patient/family on patient safety including physical limitations  - Instruct patient to call for assistance with activity   - Consult OT/PT to assist with strengthening/mobility   - Keep Call bell within reach  - Keep bed low and locked with side rails adjusted as appropriate  - Keep care items and personal belongings within reach  - Initiate and maintain comfort rounds  - Make Fall Risk Sign visible to staff  - Initiate/Maintain bed alarm  - Obtain necessary fall risk management equipment: bed alarm, call bell,  socks  - Apply yellow socks and bracelet for high  fall risk patients  - Consider moving patient to room near nurses station  Outcome: Progressing

## 2024-05-07 NOTE — ANESTHESIA PREPROCEDURE EVALUATION
Procedure:  CHOLECYSTECTOMY LAPAROSCOPIC (Abdomen)    Relevant Problems   ANESTHESIA (within normal limits)      CARDIO (within normal limits)      ENDO (within normal limits)      GI/HEPATIC  Acute cholecystitis      GYN  9 weeks postpartum  Breastfeeding currently      PULMONARY (within normal limits)      Obstetrics/Gynecology   (+) S/P primary low transverse         Physical Exam    Airway    Mallampati score: I  TM Distance: >3 FB  Neck ROM: full     Dental   No notable dental hx     Cardiovascular  Cardiovascular exam normal    Pulmonary  Pulmonary exam normal     Other Findings        Anesthesia Plan  ASA Score- 2 Emergent    Anesthesia Type- general with ASA Monitors.         Additional Monitors:     Airway Plan: ETT.    Comment: Discussed with patient, she may continue breastfeeding before and after surgery. .       Plan Factors-    Chart reviewed.    Patient summary reviewed.    Patient is not a current smoker.              Induction- intravenous.    Postoperative Plan-     Informed Consent- Anesthetic plan and risks discussed with patient.

## 2024-05-07 NOTE — QUICK NOTE
Post-op check: Laparoscopic appendectomy done by Dr. Camacho on 5/7 without any complications.    Patient states she still feeling groggy after procedure.  She is currently still on 2 L of oxygen nasal cannula saturating 90-92%.  She is in a normal amount of abdominal discomfort around the incision sites.  Incision sites are clean dry and intact covered with glue.  There are no signs of erythema, swelling, or drainage.  She denies any nausea, vomiting, fevers, or chills.  Family was present in the room and are agreeing with the plan to stay overnight.    A/P:  28-year-old female status post laparoscopic appendectomy done by Dr. Camacho on 5/7.  -Monitor oxygen saturation overnight.  -Plan for DC tomorrow  -Advance diet as tolerated

## 2024-05-07 NOTE — ANESTHESIA POSTPROCEDURE EVALUATION
Post-Op Assessment Note    CV Status:  Stable  Pain Score: 2    Pain management: adequate    Multimodal analgesia used between 6 hours prior to anesthesia start to PACU discharge    Mental Status:  Sleepy and arousable   Hydration Status:  Stable   PONV Controlled:  Controlled   Airway Patency:  Patent  Two or more mitigation strategies used for obstructive sleep apnea   Post Op Vitals Reviewed: Yes    No anethesia notable event occurred.    Staff: VITALIY               /55 (05/07/24 1809)    Temp (!) 97.4 °F (36.3 °C) (05/07/24 1809)    Pulse 93 (05/07/24 1809)   Resp 16 (05/07/24 1809)    SpO2 93 % (05/07/24 1809)

## 2024-05-07 NOTE — OP NOTE
OPERATIVE REPORT  PATIENT NAME: Jaelyn Gallo    :  1995  MRN: [id number]  Pt Location: [facility] OR ROOM 02    SURGERY DATE: 2024    Surgeons and Role:     * Vianca Camacho MD - Primary     * Nancy Staley MD - Assisting    Preop Diagnosis:  Acute cholecystitis [K81.0]    Post-Op Diagnosis Codes:     * Acute cholecystitis [K81.0]    Procedure(s):  CHOLECYSTECTOMY LAPAROSCOPIC    Specimen(s):  ID Type Source Tests Collected by Time Destination   1 :  Tissue Gallbladder TISSUE EXAM Vianca Camacho MD 2024 1704        Estimated Blood Loss:   Minimal    Drains:  * No LDAs found *    Anesthesia Type:   General    Operative Indications:  Acute cholecystitis [K81.0]      Operative Findings:  Edematous acute cholecystitis     Complications:   None    Procedure and Technique:  Patient was identified in the preoperative holding area and taken back to the operating room. The patient was positioned supine on the operating room table. General anesthesia was induced and patient was prepped and draped in the standard sterile surgical fashion. A preoperative time-out was held confirming the correct patient, correct procedure and that all necessary equipment and personnel were present within the operating room.  Preoperative antibiotics were administered prior to incision.  An incision was made at the umbilicus and a Veress needle was then inserted into the abdomen. The abdomen was then insufflated to 15 mmHg.  The Veress needle was then removed and a 5 mm port placed at the umbilicus.  The laparoscope was inserted into the port.  Intra-abdominal contents were inspected and there was no trauma from port or needle placement. An 11 mm port was then placed in the epigastric region and two 5 mm ports placed along the right costal margin under direct visualization, in similar fashion.  The gallbladder was grasped with an atraumatic grasper and elevated cephalad above the liver.  Omental adhesions were then taken down  bluntly and with judicious electrocautery.  The peritoneum of the neck of the gallbladder was then opened with electrocautery and blunt dissection.  This was done circumferentially down to the liver bed laterally and medially.  The cystic duct was identified as the anterior most tubular structure at the gallbladder neck that was clearly directly entering the gallbladder. The [place] dissector was used to circumferentially dissect the cystic duct free from surrounding tissue.  Attention was then turned to the cystic artery which was medial to the cystic duct and this was circumferentially dissected.  The posterior aspect of the gallbladder was then cleared of fatty tissue and peritoneal attachments so that the liver bed was visible, thus obtaining a critical view.  2 hemoclips were placed proximally and 1 clip distally on the cystic artery and this was divided  with Endo Tatiana.  3 clips were placed proximally and 1 clip placed distally on the cystic duct and this was divided with Endo Tatiana.  Dissection of the gallbladder was continued with electrocautery until the gallbladder was freed from the liver bed.  Technically difficult due to significant tissue edema.  There was a small perforation of the gallbladder created which was suctioned.  Bilious fluid.  There was no spillage of any gallstones.  Once the gallbladder was freed it was placed in an Endo-Catch bag and removed from the abdomen.  Examination the liver bed was performed and any bleeding points were controlled with electrocautery.  Focused irrigation and suction of the gallbladder fossa was performed . The clips were re-examined and confirmed to be secure without any bilious drainage or bleeding. The 11 mm port was then removed and an 0 Vicryl suture on a closure device was used to close the fascia of the port site.  The remaining ports were then removed and the abdomen was allowed to desufflate.  Local anesthetic was injected around each of the port  sites and the skin then closed with 4 0 Monocryl suture in subcuticular fashion.  Skin glue was then applied.  The patient was then awoken from general anesthesia and taken to the postoperative care unit in good condition. All counts were correct at the end of the case.     I was present for the entire procedure.    Patient Disposition:  PACU  and hemodynamically stable        SIGNATURE: Vianca Camacho MD  DATE: May 7, 2024  TIME: 5:42 PM
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
## 2024-05-07 NOTE — DISCHARGE INSTR - AVS FIRST PAGE
General Surgery Discharge Instructions      1. General: You will feel pulling sensations around the wound or funny aches and pains around the incisions. You may have some bruising or mild redness. This is normal. Even minor surgery is a change in your body and this is your body’s reaction to it. If you have had abdominal surgery, it may help to support the incision with a small pillow or blanket for comfort when moving or coughing. There may be some hardness surrounding your incision which is your body's normal reaction to surgery. This typically softens up over time. You may also experience itching as the incision heals. A heating pad or ice pack can also be beneficial in the post-op period.     2. Wound care: Make sure to remove the overlying dressing/bandage in about 24 hours, unless instructed otherwise. You usually don't have to redress the wound after 24-48 hours, unless for comfort. Keep the incision clean and dry. Let air get to it. If this Steri-Strips fall off, just keep the wound clean. If there is surgical glue in place this will usually start to soften in around 2 weeks and will eventually dissolve or fall off with gentle scrubbing in the shower.    3. Water: You may shower over the wound, unless there are drain tubes left in place. Do not bathe or use a pool or hot tub until cleared by the physician. Do not swim in a lake or ocean until cleared by your physician. You may shower right over the staples or Steri-Strips and pat dry when you are done.    4. Activity: You may go up and down stairs, walk as much as you are comfortable, but walk at least 3 times each day. If you have had abdominal surgery, do not lift anything heavier than 10-15 pounds for at least 2 weeks, unless cleared by the doctor. Notes and paperwork for your job are typically filled out at your post-op appointment but if there is a time constraint please call the office for assistance if this is needed prior to your post-op  check.    5. Diet: You may resume a regular diet. If you had a same-day surgery or overnight stay surgery, you may wish to eat lightly for a few days: soups, crackers, and sandwiches. You may resume a regular diet when ready. If you have had gallbladder surgery, you should remain on a low fat diet for 2 weeks or at least until your post op appointment.     6. Medications: Resume all of your previous medications, unless told otherwise by the doctor. Ibuprofen (Advil, Motrin, etc.) is usually ok unless specifically discouraged by your surgeon. 400-600 mg of ibuprofen every 6 hours for post-surgical pain is an acceptable regimen with a maximum dosage of 2400 mg per day. Avoid ibuprofen if you are taking aspirin. If you have a bleeding disorder or have stomach issues, talk to your surgeon prior to use. Tylenol is ok, unless you are taking any narcotic pain medication containing Tylenol (such as Percocet, Darvocet, Vicodin, or anything containing acetaminophen). Be cautious taking additional Tylenol if you're taking these medications as there is a maximum dosage of 4,000 mg of Tylenol per day. You do not need to take the narcotic pain medications unless you are having significant pain and discomfort.    7. Driving: You will need someone to drive you home on the day of surgery. Do not drive or make any important decisions while on narcotic pain medication or 24 hours and after anesthesia or sedation for surgery. Generally, you may drive when you are off all narcotic pain medications.    8. Upset Stomach: You may take Maalox, Tums, or similar items for an upset stomach. If your narcotic pain medication causes an upset stomach, do not take it on an empty stomach. Try taking it with at least some crackers or toast.     9. Constipation: Patients often experience constipation after surgery due to anesthesia and pain medication. This is especially common after abdominal surgery. You may take over-the-counter medication for  this, such as Metamucil, Senokot, Dulcolax, milk of magnesia, etc. You may take a suppository unless you have had anorectal surgery such as a procedure on your hemorrhoids. If you experience significant nausea or vomiting after abdominal surgery, call the office before trying any of these medications.    10. Pain: You may be given a prescription for pain. This will be prescribed the day of surgery and sent to your pharmacy of choice. Take the pain medication as prescribed, only if you need it. Narcotic pain medications (such as anything containing hydrocodone or oxycodone) have many side effects such as nausea/vomiting, constipation, dizziness and respiratory depression. Do not drive when taking the pain medication. Do not take more than prescribed in the 4-6 hour time period.    11. Sexual Activity: You may resume sexual activity when you feel ready and comfortable and your incision is sealed and healed without apparent infection risk.    12. Urination: If you haven't urinated in 6 hours and are unable to urinate please call the office. If you are having pain and can not urinate you need to be evaluated by a physician and should go to the emergency room.     Call the office: If you are experiencing any of the following, fevers above 101.5°, significant nausea or vomiting, if the wound develops drainage and/or has excessive redness around the wound, or if you have significant diarrhea or other worsening symptoms.

## 2024-05-08 VITALS
SYSTOLIC BLOOD PRESSURE: 121 MMHG | DIASTOLIC BLOOD PRESSURE: 87 MMHG | OXYGEN SATURATION: 96 % | HEART RATE: 64 BPM | RESPIRATION RATE: 18 BRPM | TEMPERATURE: 96.5 F

## 2024-05-08 PROBLEM — K81.0 ACUTE CHOLECYSTITIS: Status: ACTIVE | Noted: 2024-05-08

## 2024-05-08 PROCEDURE — NC001 PR NO CHARGE: Performed by: SURGERY

## 2024-05-08 RX ORDER — OXYCODONE HYDROCHLORIDE 5 MG/1
5 TABLET ORAL EVERY 6 HOURS PRN
Qty: 10 TABLET | Refills: 0 | Status: SHIPPED | OUTPATIENT
Start: 2024-05-08 | End: 2024-05-18

## 2024-05-08 RX ORDER — ACETAMINOPHEN 325 MG/1
650 TABLET ORAL EVERY 6 HOURS PRN
Status: DISCONTINUED | OUTPATIENT
Start: 2024-05-08 | End: 2024-05-08 | Stop reason: HOSPADM

## 2024-05-08 RX ADMIN — ACETAMINOPHEN 650 MG: 325 TABLET, FILM COATED ORAL at 03:39

## 2024-05-08 RX ADMIN — OXYCODONE 5 MG: 5 TABLET ORAL at 08:38

## 2024-05-08 RX ADMIN — ENOXAPARIN SODIUM 40 MG: 40 INJECTION SUBCUTANEOUS at 08:36

## 2024-05-08 NOTE — PLAN OF CARE
Problem: PAIN - ADULT  Goal: Verbalizes/displays adequate comfort level or baseline comfort level  Description: Interventions:  - Encourage patient to monitor pain and request assistance  - Assess pain using appropriate pain scale  - Administer analgesics based on type and severity of pain and evaluate response  - Implement non-pharmacological measures as appropriate and evaluate response  - Consider cultural and social influences on pain and pain management  - Notify physician/advanced practitioner if interventions unsuccessful or patient reports new pain  Outcome: Progressing     Problem: INFECTION - ADULT  Goal: Absence or prevention of progression during hospitalization  Description: INTERVENTIONS:  - Assess and monitor for signs and symptoms of infection  - Monitor lab/diagnostic results  - Monitor all insertion sites, i.e. indwelling lines, tubes, and drains  - Monitor endotracheal if appropriate and nasal secretions for changes in amount and color  - [place] appropriate cooling/warming therapies per order  - Administer medications as ordered  - Instruct and encourage patient and family to use good hand hygiene technique  - Identify and instruct in appropriate isolation precautions for identified infection/condition  Outcome: Progressing  Goal: Absence of fever/infection during neutropenic period  Description: INTERVENTIONS:  - Monitor WBC    Outcome: Progressing     Problem: SAFETY ADULT  Goal: Patient will remain free of falls  Description: INTERVENTIONS:  - Educate patient/family on patient safety including physical limitations  - Instruct patient to call for assistance with activity   - Consult OT/PT to assist with strengthening/mobility   - Keep Call bell within reach  - Keep bed low and locked with side rails adjusted as appropriate  - Keep care items and personal belongings within reach  - Initiate and maintain comfort rounds  - Make Fall Risk Sign visible to staff  - Offer Toileting every 2 Hours,  in advance of need  - Initiate/Maintain bed alarm  - Obtain necessary fall risk management equipment: yellow socks  - Apply yellow socks and bracelet for high fall risk patients  - Consider moving patient to room near nurses station  Outcome: Progressing     Problem: DISCHARGE PLANNING  Goal: Discharge to home or other facility with appropriate resources  Description: INTERVENTIONS:  - Identify barriers to discharge w/patient and caregiver  - Arrange for needed discharge resources and transportation as appropriate  - Identify discharge learning needs (meds, wound care, etc.)  - Arrange for interpretive services to assist at discharge as needed  - Refer to Case Management Department for coordinating discharge planning if the patient needs post-hospital services based on physician/advanced practitioner order or complex needs related to functional status, cognitive ability, or social support system  Outcome: Progressing     Problem: Knowledge Deficit  Goal: Patient/family/caregiver demonstrates understanding of disease process, treatment plan, medications, and discharge instructions  Description: Complete learning assessment and assess knowledge base.  Interventions:  - Provide teaching at level of understanding  - Provide teaching via preferred learning methods  Outcome: Progressing

## 2024-05-08 NOTE — PROGRESS NOTES
Progress Note - General Surgery   Jaelyn Gallo 28 y.o. female MRN: [id number]  Unit/Bed#: E2 -01 Encounter: [id number]    Assessment:  27yo F with acute cholecystitis s/p lap sheila 5/7    AVSS on room air    Plan:  - regular diet  - pain and nausea control  - ambulate, OOB  - anticipate discharge home today    Subjective/Objective   Subjective:   No acute events overnight. Tolerating PO intake. Pain well controlled. Denies nausea and vomiting.    Objective:     Blood pressure 110/73, pulse 64, temperature 97.6 °F (36.4 °C), temperature source Temporal, resp. rate 18, SpO2 95%, currently breastfeeding.,There is no height or weight on file to calculate BMI.      Intake/Output Summary (Last 24 hours) at 5/8/2024 0648  Last data filed at 5/7/2024 2310  Gross per 24 hour   Intake 1950 ml   Output 550 ml   Net 1400 ml       Invasive Devices       Peripheral Intravenous Line  Duration             Peripheral IV 05/07/24 Left Antecubital <1 day                    Physical Exam:  General: No acute distress  Neuro: alert and oriented  HEENT: moist mucous membranes  CV: Well perfused, regular rate and rhythm  Lungs: Normal work of breathing, no increased respiratory effort  Abdomen: Soft, non-tender, non-distended. Incisions clean, dry and intact.  Extremities: No edema, clubbing or cyanosis  Skin: Warm, dry      Althea Staley MD  General Surgery PGY2

## 2024-05-08 NOTE — PLAN OF CARE
Problem: PAIN - ADULT  Goal: Verbalizes/displays adequate comfort level or baseline comfort level  Description: Interventions:  - Encourage patient to monitor pain and request assistance  - Assess pain using appropriate pain scale  - Administer analgesics based on type and severity of pain and evaluate response  - Implement non-pharmacological measures as appropriate and evaluate response  - Consider cultural and social influences on pain and pain management  - Notify physician/advanced practitioner if interventions unsuccessful or patient reports new pain  Outcome: Progressing     Problem: DISCHARGE PLANNING  Goal: Discharge to home or other facility with appropriate resources  Description: INTERVENTIONS:  - Identify barriers to discharge w/patient and caregiver  - Arrange for needed discharge resources and transportation as appropriate  - Identify discharge learning needs (meds, wound care, etc.)  - Arrange for interpretive services to assist at discharge as needed  - Refer to Case Management Department for coordinating discharge planning if the patient needs post-hospital services based on physician/advanced practitioner order or complex needs related to functional status, cognitive ability, or social support system  Outcome: Progressing     Problem: GASTROINTESTINAL - ADULT  Goal: Minimal or absence of nausea and/or vomiting  Description: INTERVENTIONS:  - Administer IV fluids if ordered to ensure adequate hydration  - Maintain NPO status until nausea and vomiting are resolved  - Nasogastric tube if ordered  - Administer ordered antiemetic medications as needed  - Provide nonpharmacologic comfort measures as appropriate  - Advance diet as tolerated, if ordered  - Consider nutrition services referral to assist patient with adequate nutrition and appropriate food choices  Outcome: Progressing  Goal: Maintains or returns to baseline bowel function  Description: INTERVENTIONS:  - Assess bowel function  - Encourage  oral fluids to ensure adequate hydration  - Administer IV fluids if ordered to ensure adequate hydration  - Administer ordered medications as needed  - Encourage mobilization and activity  - Consider nutritional services referral to assist patient with adequate nutrition and appropriate food choices  Outcome: Progressing  Goal: Maintains adequate nutritional intake  Description: INTERVENTIONS:  - Monitor percentage of each meal consumed  - Identify factors contributing to decreased intake, treat as appropriate  - Assist with meals as needed  - Monitor I&O, weight, and lab values if indicated  - Obtain nutrition services referral as needed  Outcome: Progressing
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
## 2024-05-10 PROCEDURE — 88304 TISSUE EXAM BY PATHOLOGIST: CPT | Performed by: PATHOLOGY

## 2024-05-28 ENCOUNTER — OFFICE VISIT (OUTPATIENT)
Dept: SURGERY | Facility: CLINIC | Age: 29
End: 2024-05-28

## 2024-05-28 VITALS
HEIGHT: 59 IN | OXYGEN SATURATION: 98 % | HEART RATE: 72 BPM | TEMPERATURE: 98 F | WEIGHT: 190 LBS | BODY MASS INDEX: 38.3 KG/M2

## 2024-05-28 DIAGNOSIS — Z98.890 POST-OPERATIVE STATE: Primary | ICD-10-CM

## 2024-05-28 PROCEDURE — 99024 POSTOP FOLLOW-UP VISIT: CPT | Performed by: SURGERY
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
## (undated) DEVICE — PACK C-SECTION PBDS

## (undated) DEVICE — TROCAR: Brand: KII FIOS FIRST ENTRY

## (undated) DEVICE — Device

## (undated) DEVICE — TROCAR: Brand: KII® SLEEVE

## (undated) DEVICE — TELFA NON-ADHERENT ABSORBENT DRESSING: Brand: TELFA

## (undated) DEVICE — GLOVE INDICATOR PI UNDERGLOVE SZ 6.5 BLUE

## (undated) DEVICE — INTENDED FOR TISSUE SEPARATION, AND OTHER PROCEDURES THAT REQUIRE A SHARP SURGICAL BLADE TO PUNCTURE OR CUT.: Brand: BARD-PARKER SAFETY BLADES SIZE 11, STERILE

## (undated) DEVICE — SCD SEQUENTIAL COMPRESSION COMFORT SLEEVE MEDIUM KNEE LENGTH: Brand: KENDALL SCD

## (undated) DEVICE — 3M™ STERI-STRIP™ REINFORCED ADHESIVE SKIN CLOSURES, R1547, 1/2 IN X 4 IN (12 MM X 100 MM), 6 STRIPS/ENVELOPE: Brand: 3M™ STERI-STRIP™

## (undated) DEVICE — GLOVE SRG BIOGEL 6.5

## (undated) DEVICE — GLOVE SRG BIOGEL ECLIPSE 8

## (undated) DEVICE — ABDOMINAL PAD: Brand: DERMACEA

## (undated) DEVICE — SUT VICRYL 0 CT-1 36 IN J946H

## (undated) DEVICE — SUT MONOCRYL 4-0 PS-2 27 IN Y426H

## (undated) DEVICE — EXOFIN PRECISION PEN HIGH VISCOSITY TOPICAL SKIN ADHESIVE: Brand: EXOFIN PRECISION PEN, 1G

## (undated) DEVICE — GAUZE SPONGES,16 PLY: Brand: CURITY

## (undated) DEVICE — SUT VICRYL 0 CT-1 CR/8 27 IN JJ41G

## (undated) DEVICE — BLUE HEAT SCOPE WARMER

## (undated) DEVICE — [HIGH FLOW INSUFFLATOR,  DO NOT USE IF PACKAGE IS DAMAGED,  KEEP DRY,  KEEP AWAY FROM SUNLIGHT,  PROTECT FROM HEAT AND RADIOACTIVE SOURCES.]: Brand: PNEUMOSURE

## (undated) DEVICE — ELECTRODE LAP J HOOK SPLIT STEM E-Z CLEAN 33CM -0021S

## (undated) DEVICE — TUBING SMOKE EVAC W/FILTRATION DEVICE PLUMEPORT ACTIV

## (undated) DEVICE — SURGICEL 2 X 3

## (undated) DEVICE — SUT VICRYL 0 UR-6 27 IN J603H

## (undated) DEVICE — PMI DISPOSABLE PUNCTURE CLOSURE DEVICE / SUTURE GRASPER: Brand: PMI

## (undated) DEVICE — 5 MM CURVED DISSECTORS WITH MONOPOLAR CAUTERY: Brand: ENDOPATH

## (undated) DEVICE — CHLORAPREP HI-LITE 26ML ORANGE

## (undated) DEVICE — GLOVE INDICATOR PI UNDERGLOVE SZ 8 BLUE

## (undated) DEVICE — DISPOSABLE OR TOWEL: Brand: CARDINAL HEALTH

## (undated) DEVICE — SINGLE PORT MANIFOLD: Brand: NEPTUNE 2

## (undated) DEVICE — METZENBAUM ADTEC SINGLE USE DISSECTING SCISSORS, SHAFT ONLY, MONOPOLAR, CURVED TO LEFT, WORKING LENGTH: 12 1/4", (310 MM), DIAM. 5 MM, INSULATED, DOUBLE ACTION, STERILE, DISPOSABLE, PACKAGE OF 10 PIECES: Brand: AESCULAP

## (undated) DEVICE — TISSUE RETRIEVAL SYSTEM: Brand: INZII RETRIEVAL SYSTEM

## (undated) DEVICE — LIGACLIP 10-M/L, 10MM ENDOSCOPIC ROTATING MULTIPLE CLIP APPLIERS: Brand: LIGACLIP

## (undated) DEVICE — SWABSTCK, BENZOIN TINCTURE, 1/PK, STRL: Brand: APLICARE

## (undated) DEVICE — ALLENTOWN LAP CHOLE APP PACK: Brand: CARDINAL HEALTH

## (undated) DEVICE — ENDOPATH PNEUMONEEDLE INSUFFLATION NEEDLES WITH LUER LOCK CONNECTORS 120MM: Brand: ENDOPATH

## (undated) DEVICE — SKIN MARKER DUAL TIP WITH RULER CAP, FLEXIBLE RULER AND LABELS: Brand: DEVON